# Patient Record
Sex: FEMALE | Race: BLACK OR AFRICAN AMERICAN | Employment: UNEMPLOYED | URBAN - METROPOLITAN AREA
[De-identification: names, ages, dates, MRNs, and addresses within clinical notes are randomized per-mention and may not be internally consistent; named-entity substitution may affect disease eponyms.]

---

## 2017-01-17 ENCOUNTER — TELEPHONE (OUTPATIENT)
Dept: FAMILY MEDICINE CLINIC | Age: 47
End: 2017-01-17

## 2017-01-17 NOTE — TELEPHONE ENCOUNTER
Patient called to say she was to have had a referral order. She did not say to where. Please advise as this will be regarding her last visit. There are no orders in chart.

## 2017-01-18 NOTE — TELEPHONE ENCOUNTER
Talked to the patient. She will make an appointment for [address] [place] [address] soon. Question about elevated glucose and MCV levels were explained.

## 2017-01-31 ENCOUNTER — HOSPITAL ENCOUNTER (OUTPATIENT)
Dept: ULTRASOUND IMAGING | Age: 47
Discharge: HOME | End: 2017-01-31
Attending: FAMILY MEDICINE
Payer: MEDICAID

## 2017-01-31 DIAGNOSIS — R10.33 PERIUMBILICAL ABDOMINAL PAIN: ICD-10-CM

## 2017-01-31 PROCEDURE — 76705 ECHO EXAM OF ABDOMEN: CPT

## 2017-01-31 NOTE — PROGRESS NOTES
Talked to the patient over the phone. Discussed the results. Currently abdominal pain resolved, however, I recommend to follow up with her GYN for possible uterine fibroma to see if it's size is growing. Patient will follow up with Dr. Leda Zambrano at [facility].

## 2017-02-01 ENCOUNTER — TELEPHONE (OUTPATIENT)
Dept: OBGYN CLINIC | Age: 47
End: 2017-02-01

## 2017-02-01 NOTE — TELEPHONE ENCOUNTER
2:53PM Pt called requesting an appt to be seen for a f/u from  for US done 1/31/2017. Stated she was told she had a 5 cm mass on her uterus. Appt given for 2/3/2017. Pt also requested STD testing.  FYI only

## 2017-02-01 NOTE — TELEPHONE ENCOUNTER
Make sure she has a pelvic ultrasound here that day as well. Thanks.  Not enough info on the one she had

## 2017-02-01 NOTE — TELEPHONE ENCOUNTER
Notified pt with MD recommendation. She verbalized understanding and was given an US appt with her ov.

## 2020-01-15 ENCOUNTER — OFFICE VISIT (OUTPATIENT)
Dept: OBGYN CLINIC | Age: 50
End: 2020-01-15

## 2020-01-15 ENCOUNTER — HOSPITAL ENCOUNTER (OUTPATIENT)
Dept: MAMMOGRAPHY | Age: 50
Discharge: HOME | End: 2020-01-15
Attending: FAMILY MEDICINE
Payer: COMMERCIAL

## 2020-01-15 VITALS
WEIGHT: 221 LBS | HEIGHT: 62 IN | BODY MASS INDEX: 40.67 KG/M2 | DIASTOLIC BLOOD PRESSURE: 96 MMHG | SYSTOLIC BLOOD PRESSURE: 176 MMHG

## 2020-01-15 DIAGNOSIS — Z01.419 ENCOUNTER FOR GYNECOLOGICAL EXAMINATION WITHOUT ABNORMAL FINDING: Primary | ICD-10-CM

## 2020-01-15 DIAGNOSIS — N89.8 VAGINAL DISCHARGE: ICD-10-CM

## 2020-01-15 DIAGNOSIS — Z11.51 SPECIAL SCREENING EXAMINATION FOR HUMAN PAPILLOMAVIRUS (HPV): ICD-10-CM

## 2020-01-15 DIAGNOSIS — Z12.31 VISIT FOR SCREENING MAMMOGRAM: ICD-10-CM

## 2020-01-15 DIAGNOSIS — E66.01 OBESITY, MORBID (HCC): ICD-10-CM

## 2020-01-15 DIAGNOSIS — D21.9 FIBROIDS: ICD-10-CM

## 2020-01-15 PROCEDURE — 77067 SCR MAMMO BI INCL CAD: CPT

## 2020-01-15 NOTE — PATIENT INSTRUCTIONS
Well Visit, Ages 25 to 48: Care Instructions  Your Care Instructions    Physical exams can help you stay healthy. Your doctor has checked your overall health and may have suggested ways to take good care of yourself. He or she also may have recommended tests. At home, you can help prevent illness with healthy eating, regular exercise, and other steps. Follow-up care is a key part of your treatment and safety. Be sure to make and go to all appointments, and call your doctor if you are having problems. It's also a good idea to know your test results and keep a list of the medicines you take. How can you care for yourself at home? · Reach and stay at a healthy weight. This will lower your risk for many problems, such as obesity, diabetes, heart disease, and high blood pressure. · Get at least 30 minutes of physical activity on most days of the week. Walking is a good choice. You also may want to do other activities, such as running, swimming, cycling, or playing tennis or team sports. Discuss any changes in your exercise program with your doctor. · Do not smoke or allow others to smoke around you. If you need help quitting, talk to your doctor about stop-smoking programs and medicines. These can increase your chances of quitting for good. · Talk to your doctor about whether you have any risk factors for sexually transmitted infections (STIs). Having one sex partner (who does not have STIs and does not have sex with anyone else) is a good way to avoid these infections. · Use birth control if you do not want to have children at this time. Talk with your doctor about the choices available and what might be best for you. · Protect your skin from too much sun. When you're outdoors from 10 a.m. to 4 p.m., stay in the shade or cover up with clothing and a hat with a wide brim. Wear sunglasses that block UV rays. Even when it's cloudy, put broad-spectrum sunscreen (SPF 30 or higher) on any exposed skin.   · See a dentist one or two times a year for checkups and to have your teeth cleaned. · Wear a seat belt in the car. Follow your doctor's advice about when to have certain tests. These tests can spot problems early. For everyone  · Cholesterol. Have the fat (cholesterol) in your blood tested after age 21. Your doctor will tell you how often to have this done based on your age, family history, or other things that can increase your risk for heart disease. · Blood pressure. Have your blood pressure checked during a routine doctor visit. Your doctor will tell you how often to check your blood pressure based on your age, your blood pressure results, and other factors. · Vision. Talk with your doctor about how often to have a glaucoma test.  · Diabetes. Ask your doctor whether you should have tests for diabetes. · Colon cancer. Your risk for colorectal cancer gets higher as you get older. Some experts say that adults should start regular screening at age 48 and stop at age 76. Others say to start before age 48 or continue after age 76. Talk with your doctor about your risk and when to start and stop screening. For women  · Breast exam and mammogram. Talk to your doctor about when you should have a clinical breast exam and a mammogram. Medical experts differ on whether and how often women under 50 should have these tests. Your doctor can help you decide what is right for you. · Cervical cancer screening test and pelvic exam. Begin with a Pap test at age 24. The test often is part of a pelvic exam. Starting at age 27, you may choose to have a Pap test, an HPV test, or both tests at the same time (called co-testing). Talk with your doctor about how often to have testing. · Tests for sexually transmitted infections (STIs). Ask whether you should have tests for STIs. You may be at risk if you have sex with more than one person, especially if your partners do not wear condoms.   For men  · Tests for sexually transmitted infections (STIs). Ask whether you should have tests for STIs. You may be at risk if you have sex with more than one person, especially if you do not wear a condom. · Testicular cancer exam. Ask your doctor whether you should check your testicles regularly. · Prostate exam. Talk to your doctor about whether you should have a blood test (called a PSA test) for prostate cancer. Experts differ on whether and when men should have this test. Some experts suggest it if you are older than 39 and are -American or have a father or brother who got prostate cancer when he was younger than 72. When should you call for help? Watch closely for changes in your health, and be sure to contact your doctor if you have any problems or symptoms that concern you. Where can you learn more? Go to http://perry-diogo.info/. Enter P072 in the search box to learn more about \"Well Visit, Ages 25 to 48: Care Instructions. \"  Current as of: December 13, 2018  Content Version: 12.2  © 5253-7549 [employer]. Care instructions adapted under license by [employer] (which disclaims liability or warranty for this information). If you have questions about a medical condition or this instruction, always ask your healthcare professional. [place] [address] any warranty or liability for your use of this information.

## 2020-01-15 NOTE — PROGRESS NOTES
Benson Murphy is a ,  47 y.o. female [address] whose LMP was on 1/3/2020 who presents for her annual checkup. She c/o vaginal discharge and requests std testing. Has hx of [place] Ale[place] and fibroids    Menstrual status:    Her periods are moderate in flow. She is using three to five pads or tampons per day, usually regular every 26-30 days. She denies dysmenorrhea. She reports no premenstrual symptoms. The patient is not using HRT. Contraception:    The current method of family planning is abstinence. Sexual history:    She  reports previously being sexually active and has had partner(s) who are Male. She reports using the following method of birth control/protection: Condom. Medical conditions:    Since her last annual GYN exam about 3/10/2016 ago, she has had the following changes in her health history: none. Pap and Mammogram History:    Her most recent Pap smear was normal obtained 2014 year(s) ago. The patient has her mammogram scheduled today. .    Breast Cancer History/Substance Abuse:    She has no family history of breast cancer. Osteoporosis History:    Family history does not include a first or second degree relative with osteopenia or osteoporosis. A bone density scan was not obtained. She is currently not taking calcium and vit D. Past Medical History:   Diagnosis Date    Anemia     Fibroids     2010 - embolization     Herpes genitalia     positive     Past Surgical History:   Procedure Laterality Date    HX COLPOSCOPY      WNL per patient    HX GYN  2011    uterine fibroid embolization    HX MYOMECTOMY       Current Outpatient Medications   Medication Sig Dispense Refill    MULTIVITAMIN WITH MINERALS (HAIR,SKIN AND NAILS PO) Take  by mouth.  omega-3 fatty acids-vitamin e (FISH OIL) 1,000 mg Cap Take 1 Cap by mouth.  multivitamin,tx-iron-ca-min (THERA-M) 27-0.4 mg Tab Take 1 Tab by mouth every other day.  naproxen (NAPROSYN) 375 mg tablet Take 1 Tab by mouth two (2) times daily (with meals). 20 Tab 0    ferrous sulfate (IRON) 325 mg (65 mg iron) tablet Take 1 Tab by mouth Daily (before breakfast). 90 Tab 3     Allergies: Patient has no known allergies. Social History     Socioeconomic History    Marital status: SINGLE     Spouse name: Not on file    Number of children: Not on file    Years of education: Not on file    Highest education level: Not on file   Occupational History    Not on file   Social Needs    Financial resource strain: Not on file    Food insecurity:     Worry: Not on file     Inability: Not on file    Transportation needs:     Medical: Not on file     Non-medical: Not on file   Tobacco Use    Smoking status: Former Smoker     Packs/day: 0.20     Years: 5.00     Pack years: 1.00     Types: Cigarettes     Last attempt to quit: 12/10/2011     Years since quittin.1    Smokeless tobacco: Never Used    Tobacco comment: 2-3 cigarettes a week   Substance and Sexual Activity    Alcohol use:  Yes     Alcohol/week: 2.5 standard drinks     Types: 1 Glasses of wine, 1 Cans of beer, 1 Shots of liquor per week     Comment: social     Drug use: No    Sexual activity: Not Currently     Partners: Male     Birth control/protection: Condom   Lifestyle    Physical activity:     Days per week: Not on file     Minutes per session: Not on file    Stress: Not on file   Relationships    Social connections:     Talks on phone: Not on file     Gets together: Not on file     Attends Sabianism service: Not on file     Active member of club or organization: Not on file     Attends meetings of clubs or organizations: Not on file     Relationship status: Not on file    Intimate partner violence:     Fear of current or ex partner: Not on file     Emotionally abused: Not on file     Physically abused: Not on file     Forced sexual activity: Not on file   Other Topics Concern    Not on file   Social History Narrative    Works from home        Lives with daughter     Tobacco History:  reports that she quit smoking about 8 years ago. Her smoking use included cigarettes. She has a 1.00 pack-year smoking history. She has never used smokeless tobacco.  Alcohol Abuse:  reports current alcohol use of about 2.5 standard drinks of alcohol per week. Drug Abuse:  reports no history of drug use.   Patient Active Problem List   Diagnosis Code    Anemia D64.9    Family history of diabetes mellitus Z83.3    Obesity E66.9    Family history of thyroid disease Z83.49    Cerumen impaction H61.20    Candidal intertrigo B37.2    Uterine fibroid D25.9    Preventative health care Z00.00         Review of Systems - History obtained from the patient  Constitutional: negative for weight loss, fever, night sweats  HEENT: negative for hearing loss, earache, congestion, snoring, sorethroat  CV: negative for chest pain, palpitations, edema  Resp: negative for cough, shortness of breath, wheezing  GI: negative for change in bowel habits, abdominal pain, black or bloody stools  : negative for frequency, dysuria, hematuria, vaginal discharge  MSK: negative for back pain, joint pain, muscle pain  Breast: negative for breast lumps, nipple discharge, galactorrhea  Skin :negative for itching, rash, hives  Neuro: negative for dizziness, headache, confusion, weakness  Psych: negative for anxiety, depression, change in mood  Heme/lymph: negative for bleeding, bruising, pallor    Physical Exam    Visit Vitals  BP (!) 176/96   Ht 5' 2\" (1.575 m)   Wt 221 lb (100.2 kg)   LMP 01/03/2020 (Approximate)   BMI 40.42 kg/m²     Constitutional  · Appearance: well-nourished, well developed, alert, in no acute distress    HENT  · Head and Face: appears normal    Neck  · Inspection/Palpation: normal appearance, no masses or tenderness  · Lymph Nodes: no lymphadenopathy present  · Thyroid: gland size normal, nontender, no nodules or masses present on palpation    Chest  · Respiratory Effort: breathing normal  · Auscultation: normal breath sounds    Cardiovascular  · Heart:  · Auscultation: regular rate and rhythm without murmur    Breasts  · Inspection of Breasts: breasts symmetrical, no skin changes, no discharge present, nipple appearance normal, no skin retraction present  · Palpation of Breasts and Axillae: no masses present on palpation, no breast tenderness  · Axillary Lymph Nodes: no lymphadenopathy present    Gastrointestinal  · Abdominal Examination: abdomen non-tender to palpation, normal bowel sounds, no masses present  · Liver and spleen: no hepatomegaly present, spleen not palpable  · Hernias: no hernias identified    Skin  · General Inspection: no rash, no lesions identified    Neurologic/Psychiatric  · Mental Status:  · Orientation: grossly oriented to person, place and time  · Mood and Affect: mood normal, affect appropriate    Genitourinary  · External Genitalia: normal appearance for age, no discharge present, no tenderness present, no inflammatory lesions present, no masses present, no atrophy present  · Vagina: normal vaginal vault without central or paravaginal defects, no discharge present, no inflammatory lesions present, no masses present  · Bladder: non-tender to palpation  · Urethra: appears normal  · Cervix: 16-18 weeks  · Adnexa: no adnexal tenderness present, no adnexal masses present  · Perineum: perineum within normal limits, no evidence of trauma, no rashes or skin lesions present  · Anus: anus within normal limits, no hemorrhoids present  · Inguinal Lymph Nodes: no lymphadenopathy present    Assessment:  Routine gynecologic examination  Fibroids  Desires STD testing    Plan:  Counseled re: diet, exercise, healthy lifestyle  Return for yearly wellness visits  Rec annual mammogram  Pap/HPV  GCC/STD testing

## 2020-01-16 LAB
COMMENT, 144067: NORMAL
HBV SURFACE AG SERPL QL IA: NEGATIVE
HCV AB S/CO SERPL IA: 0.1 S/CO RATIO (ref 0–0.9)
HIV 1+2 AB+HIV1 P24 AG SERPL QL IA: NON REACTIVE
HSV2 IGG SER IA-ACNC: 17.1 INDEX (ref 0–0.9)
RPR SER QL: NON REACTIVE

## 2020-01-18 LAB
A VAGINAE DNA VAG QL NAA+PROBE: NORMAL SCORE
BVAB2 DNA VAG QL NAA+PROBE: NORMAL SCORE
C ALBICANS DNA VAG QL NAA+PROBE: NEGATIVE
C GLABRATA DNA VAG QL NAA+PROBE: NEGATIVE
C TRACH RRNA SPEC QL NAA+PROBE: NEGATIVE
MEGA1 DNA VAG QL NAA+PROBE: NORMAL SCORE
N GONORRHOEA RRNA SPEC QL NAA+PROBE: NEGATIVE
T VAGINALIS RRNA SPEC QL NAA+PROBE: NEGATIVE

## 2020-01-21 ENCOUNTER — TELEPHONE (OUTPATIENT)
Dept: OBGYN CLINIC | Age: 50
End: 2020-01-21

## 2020-01-21 LAB
CYTOLOGIST CVX/VAG CYTO: NORMAL
CYTOLOGY CVX/VAG DOC CYTO: NORMAL
CYTOLOGY CVX/VAG DOC THIN PREP: NORMAL
CYTOLOGY HISTORY:: NORMAL
DX ICD CODE: NORMAL
HPV I/H RISK 1 DNA CVX QL PROBE+SIG AMP: NEGATIVE
Lab: NORMAL
Lab: NORMAL
OTHER STN SPEC: NORMAL
STAT OF ADQ CVX/VAG CYTO-IMP: NORMAL

## 2020-01-21 NOTE — TELEPHONE ENCOUNTER
Calling for lab results from 1-15-20       Patient Result Comments     Written by Jasmyn Lopez MD on 1/19/2020 10:14 PM   Labs show previous exposure to  genital herpes. If this is new information to you and you would like to discuss further please feel free to schedule appt and we can discuss further. not have access to [facility] now.       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

## 2020-01-28 NOTE — PROGRESS NOTES
Pt notified via triage- see telephone enc. 41F with PMH of uterine fibroids s/p open myomectomy (2016) and laser ablation (2018) presenting with one day of abdominal pain and obstructive symptoms. CT showed Suspected closed loop obstruction with hypoenhancement concerning for b boweel ischemia. Mesenteric swirling associated with the transition point. She was taken to the operating room emergently on 2/28/19 and underwent an exploratory laparotomy, lysis of adhesions, and small bowel resection of necrotic bowel, appendectomy, hysterorrhaphy, and myomectomy. Patient tolerated the procedure well and was transferred to PACU and then the floor without any issues. On POD1 she was noted to have a drop in her H/H from preop blood count levels. She was transfused two units of pRBCs and she had an appropriate rise in her H/H that evening. On POD2, her H/H remained stable and her diet was advanced as tolerate.    Once patient was ambulating well, voiding without difficulty, and tolerating a full diet, they were found to be stable for discharge to home.  Pain was well-controlled at time of discharge. 41F with PMH of uterine fibroids s/p open myomectomy (2016) and laser ablation (2018) presenting with one day of abdominal pain and obstructive symptoms. CT showed Suspected closed loop obstruction with hypoenhancement concerning for b boweel ischemia. Mesenteric swirling associated with the transition point. She was taken to the operating room emergently on 2/28/19 and underwent an exploratory laparotomy, lysis of adhesions, and small bowel resection of necrotic bowel, appendectomy, hysterorrhaphy, and myomectomy. Patient tolerated the procedure well and was transferred to PACU and then the floor without any issues. On POD1 she was noted to have a drop in her H/H from preop blood count levels. She was transfused two units of pRBCs and she had an appropriate rise in her H/H that evening. On POD2, her H/H remained stable and her diet was advanced as tolerate.    Once patient was ambulating well, voiding without difficulty, and tolerating a full diet, they were found to be stable for discharge to home.  Pain was well-controlled at time of discharge.     istop #: [id number]

## 2020-02-03 LAB — COLOGUARD TEST, EXTERNAL: NEGATIVE

## 2020-07-31 ENCOUNTER — TELEPHONE (OUTPATIENT)
Dept: OBGYN CLINIC | Age: 50
End: 2020-07-31

## 2020-07-31 NOTE — TELEPHONE ENCOUNTER
Call received at 135PM    45year old patient last seen in the office on  1/15/2020. Patient calling to say that her PCP did a CT scan and advised her that her fibroids are very big and she needs to follow up with her ob. Patient denies vaginal bleeding and discomfort currently. Patient reports the stomach was beginning to poke out causing her to see her PCP. ok to set up appointment for ultrasound and ov    Please advise            Patient will get the CT report from [facility] and have it faxed to our office.

## 2020-08-03 NOTE — TELEPHONE ENCOUNTER
This nurse attempted to reach and left a voice mail message for the patient to call the office back.

## 2020-09-10 ENCOUNTER — OFFICE VISIT (OUTPATIENT)
Dept: OBGYN CLINIC | Age: 50
End: 2020-09-10
Payer: COMMERCIAL

## 2020-09-10 VITALS
WEIGHT: 214 LBS | HEIGHT: 62 IN | DIASTOLIC BLOOD PRESSURE: 79 MMHG | BODY MASS INDEX: 39.38 KG/M2 | SYSTOLIC BLOOD PRESSURE: 126 MMHG

## 2020-09-10 DIAGNOSIS — K43.9 VENTRAL HERNIA WITHOUT OBSTRUCTION OR GANGRENE: ICD-10-CM

## 2020-09-10 DIAGNOSIS — D21.9 FIBROIDS: Primary | ICD-10-CM

## 2020-09-10 PROCEDURE — 99213 OFFICE O/P EST LOW 20 MIN: CPT | Performed by: OBSTETRICS & GYNECOLOGY

## 2020-09-10 PROCEDURE — 76830 TRANSVAGINAL US NON-OB: CPT | Performed by: OBSTETRICS & GYNECOLOGY

## 2020-09-10 NOTE — PROGRESS NOTES
Uterine Fibroids note    Chief Complaint   Follow-up      HPI  History:  45 y.o. female,  Patient's last menstrual period was 09/03/2020., presents for a follow up ultrasound, Has hx of [place] and fibroids      Current Method of Contraception is: abstinence    Patient states her PCP advised her to follow up with her GYN due to very large fibroids possibly extending her hernia. Patient's last menstrual period was 09/03/2020.      Patient had ultrasound today that revealed:        DIFFICULT SCAN DUE TO PATIENT BODY HABITUS. TA AND TV SCANS PERFORMED FOR BEST VISUALIZATION. UTERUS IS ANTEVERTED, ENLARGED IN SIZE AND HETEROGENOUS IN ECHOGENECITY. MULTIPLE FIBROIDS ARE SEEN. THE TWO LARGEST FIBROIDS ARE MEASURED. FIBROID 1, POSTERIOR LEFT  FIBROID 2, POSTERIOR SRIKANTH  ENDOMETRIUM MEASURES 3-4MM IN THICKNESS. NO EVIDENCE OF MASS OR ABNORMALITY SEEN. RIGHT ADNEXA APPEARS FIBROID FILLED. LEFT OVARY APPEARS WITHIN NORMAL LIMITS. A FOLLICULAR CYST IS SEEN. NO FREE FLUID SEEN IN THE CDS     CT scan done at [address]        Additional/Aggravating/Alleviating factors:    She denies Pelvic pressure, pelvic pain, dyspareunia, urinary pressure and frequency, low back pain, constipation    The patient reports the following aggravating factors: none  The patient the following alleviating factors: none     She reports the following past medical history that is notable: none  She states that she had this condition in the past.    She  is not ready to have a hysterectomy    Past Medical History:   Diagnosis Date    Anemia     Fibroids     July 2010 - embolization     Herpes genitalia     positive     Past Surgical History:   Procedure Laterality Date    HX COLPOSCOPY  1990s    WNL per patient    HX GYN  6/2011    uterine fibroid embolization    HX MYOMECTOMY       Social History     Occupational History    Not on file   Tobacco Use    Smoking status: Former Smoker     Packs/day: 0.20     Years: 5.00     Pack years: 1.00     Types: Cigarettes     Last attempt to quit: 12/10/2011     Years since quittin.7    Smokeless tobacco: Never Used    Tobacco comment: 2-3 cigarettes a week   Substance and Sexual Activity    Alcohol use: Yes     Alcohol/week: 2.5 standard drinks     Types: 1 Glasses of wine, 1 Cans of beer, 1 Shots of liquor per week     Comment: social     Drug use: No    Sexual activity: Not Currently     Partners: Male     Birth control/protection: Condom     Family History   Problem Relation Age of Onset    Diabetes Mother     Cancer Maternal Grandmother         ovary     Kidney Disease Maternal Grandmother     Cancer Other         endometrial    Cancer Other         aunt    Cancer Other     Diabetes Other      No Known Allergies  Prior to Admission medications    Medication Sig Start Date End Date Taking? Authorizing Provider   MULTIVITAMIN WITH MINERALS (HAIR,SKIN AND NAILS PO) Take  by mouth. Provider, Historical   naproxen (NAPROSYN) 375 mg tablet Take 1 Tab by mouth two (2) times daily (with meals). 16   Armando Diaz MD   ferrous sulfate (IRON) 325 mg (65 mg iron) tablet Take 1 Tab by mouth Daily (before breakfast). 13   Rosetta Vazquez MD   omega-3 fatty acids-vitamin e (FISH OIL) 1,000 mg Cap Take 1 Cap by mouth. Provider, Historical   multivitamin,tx-iron-ca-min (THERA-M) 27-0.4 mg Tab Take 1 Tab by mouth every other day.  11/15/10   Provider, Historical        Review of Systems - History obtained from the patient  Constitutional: negative for weight loss, fever, night sweats  HEENT: negative for hearing loss, earache, congestion, snoring, sorethroat  CV: negative for chest pain, palpitations, edema  Resp: negative for cough, shortness of breath, wheezing  GI: negative for change in bowel habits, abdominal pain, black or bloody stools  : negative for frequency, dysuria, hematuria, vaginal discharge    Objective:  Visit Vitals  /79   Ht 5' 2\" (1.575 m)   Wt 214 lb (97.1 kg)   LMP 09/03/2020   BMI 39.14 kg/m²     PHYSICAL EXAMINATION    Constitutional  · Appearance: well-nourished, well developed, alert, in no acute distress    HENT  · Head and Face: appears normal    Gastrointestinal  · Abdominal Examination: abdomen non-tender to palpation, normal bowel sounds, no masses present  · Liver and spleen: no hepatomegaly present, spleen not palpable  · Hernias: no hernias identified    Genitourinary  · External Genitalia: normal appearance for age, no discharge present, no tenderness present, no inflammatory lesions present, no masses present, no atrophy present  · Vagina: normal vaginal vault without central or paravaginal defects, no discharge present, no inflammatory lesions present, no masses present  · Bladder: non-tender to palpation  · Urethra: appears normal  · Cervix: normal   · Uterus: enlarged, irregular shape and consistency  · Adnexa: no adnexal tenderness present, no adnexal masses present  · Perineum: perineum within normal limits, no evidence of trauma, no rashes or skin lesions present  · Anus: anus within normal limits, no hemorrhoids present  · Inguinal Lymph Nodes: no lymphadenopathy present    Skin  · General Inspection: no rash, no lesions identified    Neurologic/Psychiatric  · Mental Status:  · Orientation: grossly oriented to person, place and time  · Mood and Affect: mood normal, affect appropriate    Assessment:   Uterine fibroids--symptomatic      Plan:  US  Endometrial biopsy  Sonohysterogram  D & C  Surgery:

## 2020-09-10 NOTE — PATIENT INSTRUCTIONS
Uterine Fibroids: Care Instructions Your Care Instructions Uterine fibroids are growths in the uterus. Fibroids aren't cancer. Doctors don't know what causes fibroids. Fibroids are very common in women during their childbearing years. Fibroids can grow on the inside of the uterus, in the muscle wall of the uterus, or near the outside wall of the uterus. In some women, fibroids cause painful cramps and heavy periods. In these cases, taking anti-inflammatory medicines, birth control pills, or using an intrauterine device (IUD) often helps decrease symptoms. Sometimes surgery is needed to treat fibroids. But if you are near menopause, you may want to wait and see if your symptoms get better. Most fibroids shrink and go away after menopause, when your menstrual periods stop completely. Follow-up care is a key part of your treatment and safety. Be sure to make and go to all appointments, and call your doctor if you are having problems. It's also a good idea to know your test results and keep a list of the medicines you take. How can you care for yourself at home? · If your doctor gave you medicine, take it as exactly as prescribed. Be safe with medicines. Call your doctor if you think you are having a problem with your medicine. · Take anti-inflammatory medicines for pain. These include ibuprofen (Advil, Motrin) and naproxen (Aleve). Read and follow all instructions on the label. · Use heat, such as a hot water bottle or a heating pad set on low, or a warm bath to relax tense muscles and relieve cramping. Put a thin cloth between the heating pad and your skin. Never go to sleep with a heating pad on. · Lie down and put a pillow under your knees. Or, lie on your side and bring your knees up to your chest. These positions may help relieve belly pain or pressure. · Keep track of how many sanitary pads or tampons you use each day. · Get at least 30 minutes of exercise on most days of the week.  Walking is a good choice. You also may want to do other activities, such as running, swimming, cycling, or playing tennis or team sports. · If you bleed longer than usual or have heavy bleeding, take a daily multivitamin with iron. When should you call for help? Call your doctor now or seek immediate medical care if: 
  · You have severe vaginal bleeding.  
  · You have new or worse belly or pelvic pain. Watch closely for changes in your health, and be sure to contact your doctor if: 
  · You have unusual vaginal bleeding.  
  · You do not get better as expected. Where can you learn more? Go to http://perry-diogo.info/ Enter B121 in the search box to learn more about \"Uterine Fibroids: Care Instructions. \" Current as of: November 8, 2019               Content Version: 12.6 © 6568-0723 [employer]. Care instructions adapted under license by [employer] (which disclaims liability or warranty for this information). If you have questions about a medical condition or this instruction, always ask your healthcare professional. [address] any warranty or liability for your use of this information.

## 2020-09-10 NOTE — PROGRESS NOTES
Chief Complaint   Follow-up      HPI  Nurys Yanes is a 45 y.o. female who presents for follow up to fibroids per PCP. Patient states that her PCP believes her fibroids are so large that they are extending her hernia. She has a ventral hernia. Has not seen surgeon. No pain   Patient's last menstrual period was 2020. Patient had ultrasound today that revealed:      DIFFICULT SCAN DUE TO PATIENT BODY HABITUS. TA AND TV SCANS PERFORMED FOR BEST VISUALIZATION. UTERUS IS ANTEVERTED, ENLARGED IN SIZE AND HETEROGENOUS IN ECHOGENECITY. MULTIPLE FIBROIDS ARE SEEN. THE TWO LARGEST FIBROIDS ARE MEASURED. FIBROID 1, POSTERIOR LEFT  FIBROID 2, POSTERIOR SRIKANTH  ENDOMETRIUM MEASURES 3-4MM IN THICKNESS. NO EVIDENCE OF MASS OR ABNORMALITY SEEN. RIGHT ADNEXA APPEARS FIBROID FILLED. LEFT OVARY APPEARS WITHIN NORMAL LIMITS. A FOLLICULAR CYST IS SEEN. NO FREE FLUID SEEN IN THE CDS          Past Medical History:   Diagnosis Date    Anemia     Fibroids     2010 - embolization     Herpes genitalia     positive     Past Surgical History:   Procedure Laterality Date    HX COLPOSCOPY      WNL per patient    HX GYN  2011    uterine fibroid embolization    HX MYOMECTOMY       Social History     Occupational History    Not on file   Tobacco Use    Smoking status: Former Smoker     Packs/day: 0.20     Years: 5.00     Pack years: 1.00     Types: Cigarettes     Last attempt to quit: 12/10/2011     Years since quittin.7    Smokeless tobacco: Never Used    Tobacco comment: 2-3 cigarettes a week   Substance and Sexual Activity    Alcohol use:  Yes     Alcohol/week: 2.5 standard drinks     Types: 1 Glasses of wine, 1 Cans of beer, 1 Shots of liquor per week     Comment: social     Drug use: No    Sexual activity: Not Currently     Partners: Male     Birth control/protection: Condom     Family History   Problem Relation Age of Onset    Diabetes Mother     Cancer Maternal Grandmother         ovary     Kidney Disease Maternal Grandmother     Cancer Other         endometrial    Cancer Other         aunt    Cancer Other     Diabetes Other        No Known Allergies  Prior to Admission medications    Medication Sig Start Date End Date Taking? Authorizing Provider   MULTIVITAMIN WITH MINERALS (HAIR,SKIN AND NAILS PO) Take  by mouth. Provider, Historical   naproxen (NAPROSYN) 375 mg tablet Take 1 Tab by mouth two (2) times daily (with meals). 5/27/16   Jammie Schaumann, MD   ferrous sulfate (IRON) 325 mg (65 mg iron) tablet Take 1 Tab by mouth Daily (before breakfast). 11/22/13   Kasey Cantu MD   omega-3 fatty acids-vitamin e (FISH OIL) 1,000 mg Cap Take 1 Cap by mouth. Provider, Historical   multivitamin,tx-iron-ca-min (THERA-M) 27-0.4 mg Tab Take 1 Tab by mouth every other day. 11/15/10   Provider, Historical        Review of Systems: A comprehensive review of systems was negative except for that written in the HPI. Objective:  Visit Vitals  /79   Ht 5' 2\" (1.575 m)   Wt 214 lb (97.1 kg)   LMP 09/03/2020   BMI 39.14 kg/m²       Physical Exam:   General appearance - alert, well appearing, and in no distress  Abdomen - soft, nontender, nondistended, no masses or organomegaly  Ventral hernia above umbilicus, the enlarged uterus is not palpable due to patient body habitus  Pelvic Exam: Uterus enlarged, sits high, not palpable through abdomen due to adiposity  Lymph- no adenopathy palpable  Skin-Warm and dry. no hyperpigmentation, vitiligo, or suspicious lesions           Assesment:   Enlarged fibroid uterus on ultrasound  Ventral hernia in upper abdomen    Plan:   Hysterectomy will not cure hernia. It is unlikely hyst and hernia repair can be done together due to hyst being   Dirty care - would let surgeon make that call. Uterine size appears stable from prior exam      RTO prn if symptoms persist or worsen. Instructions given to pt. Handouts given to pt.

## 2020-09-25 ENCOUNTER — HOSPITAL ENCOUNTER (OUTPATIENT)
Dept: PHYSICAL THERAPY | Age: 50
Discharge: HOME | End: 2020-09-25
Payer: COMMERCIAL

## 2020-09-25 ENCOUNTER — APPOINTMENT (OUTPATIENT)
Dept: PHYSICAL THERAPY | Age: 50
End: 2020-09-25

## 2020-09-25 PROCEDURE — 97162 PT EVAL MOD COMPLEX 30 MIN: CPT

## 2020-09-25 PROCEDURE — 97535 SELF CARE MNGMENT TRAINING: CPT

## 2020-09-25 NOTE — PROGRESS NOTES
[address]  [address]  [address]      INITIAL EVALUATION    NAME: Candido Salcido AGE: 45 y.o. GENDER: female  DATE: 9/25/2020  REFERRING PHYSICIAN: Brandon Cueva MD  HISTORY AND BACKGROUND: Patient referred to clinic for evaluation and treatment of LE lymphedema, L>R. Primary Diagnosis:  · Primary lymphedema (Q82.0)  Other Treatment Diagnoses:   Swelling not relieved by elevation (R60.9 edema)   Diabetic condition (E11.9)  Obesity (E66.9)  Date of Onset:6+ years  Present Symptoms and Functional Limitations: LE pain, increase in L LE size, significant impact on body image limiting socializing with others. Frustration regarding progressive of lymphedema, lack of understanding of management of lymphedema. Difficulty finding clothes/shoes that fit. Lymphedema Life Impact Scale: 22/68; 32% impairment  Past Medical History:   Past Medical History:   Diagnosis Date    Anemia     Diabetes ([address]     prediabetes    Fibroids     July 2010 - embolization     Herpes genitalia     positive     Past Surgical History:   Procedure Laterality Date    HX COLPOSCOPY  1990s    WNL per patient    HX GYN  6/2011    uterine fibroid embolization    HX MYOMECTOMY       Current Medications:    Current Outpatient Medications   Medication Sig    MULTIVITAMIN WITH MINERALS (HAIR,SKIN AND NAILS PO) Take  by mouth.  naproxen (NAPROSYN) 375 mg tablet Take 1 Tab by mouth two (2) times daily (with meals).  ferrous sulfate (IRON) 325 mg (65 mg iron) tablet Take 1 Tab by mouth Daily (before breakfast).  omega-3 fatty acids-vitamin e (FISH OIL) 1,000 mg Cap Take 1 Cap by mouth.  multivitamin,tx-iron-ca-min (THERA-M) 27-0.4 mg Tab Take 1 Tab by mouth every other day. No current facility-administered medications for this encounter.       Allergies: No Known Allergies   Prior Level of Function/Social/Work History/Personal factors and/or comorbidities impacting plan of care: Patient reports L LE swelling 6+ years, previously worked full-time. Lives with her 22 y/o daughter. Has a 29 y/o son. Pt noted to be prediabetic, obese. Living Situation: private residence, no stairs. Trainable Caregiver?: daughter, as needed. Self-care/ADLs:mod I, additional time caring for L LE skin. Mobility: indep   Sleeping Arrangement:  bed   Adaptive Equipment Owned: na  Previous Therapy:  Patient reports two treatments at [address] [place] [address] lymphedema clinic 2 months ago, discontinuing to come to this clinic due to proximity to home. Compression/Lymphedema Equipment:  Compression bandaging supplies, to bring to next treatment. SUBJECTIVE:   \"My leg has gotten worse over the past 2 years. \"  Patient reports noticing swelling over 6 years ago, working full-time. Patient reports prior to 2 years ago, LE swelling noted to be intermittent, improving with elevation, limited skin changes. Patient reports no history of LE cellulitis. Patients goals for therapy: reduced size of legs, L LE being of greatest concern, and learn how to manage lymphedema. OBJECTIVE DATA SUMMARY:   EXAMINATION/PRESENTATION/DECISION MAKING:   Pain:  Pain Scale 1: Numeric (0 - 10)  Pain Intensity 1: 3  Pain Location 1: Leg    Skin and Tissue Assessment:  Dermal Status:  [x]   Intact: R LE []  Dry   [x]  Tenuous: L LE [] Flaky   []  Wound/lesion present []  Scars   []  Dermatitis Fungal infection noted bilateral toes, L>R, L lower leg. See photos. Texture/Consistency:  [x]  Boggy [x]  Pitting Edema: +3 R/+2 L dorsal foot. []  Brawny []  Combination   [x]  Fibrotic/Woody: L dorsal foot/lower leg.     Pigmentation/Color Change:  []  Normal [x]  Hemosiderin   []  Red []  Erythematous   [x]  Hyperpigmented: full L LE [x]  Hyperlipodermatosclerosis   Anomalies:  []  Lymphorrhea []  Vesicles   []  Petechiae [x]  Warty Vercusis   []  Bullae [] Papilloma   Circulatory:  []  MARCELO []  Varicosities:   [x]  Pulses: dorsal pedal pulses palpable [x]  Vascular studies ruled out DVT in past   []  DVT History    Nails:  []   Normal  [x]   Fungus  Stemmers Sign:positive, R>L  Photos:      Height:   5'2\"  Weight:   206.6 lbs   BMI:   37.8  (36 or greater: adversely affecting lymphedema)  Volumetric Measurements:   Right:  11,207. 69 mL Left:  12,575.14 mL   % Difference: 12.20% L>R   (See scanned graph)  Range of Motion: bilateral LE WFL AROM. Strength: Hip m 4+/5, remaining LE strength grossly 5/5 with MMT bilateral LE>  Sensation:  Intact to light touch bilateral LE. Mobility:  Bed/Chair Mobility:  Independent  Transfers:  Independent    Sitting Balance:  good Standing Balance:  good   Gait:  Independent  Wheelchair Mobility:  (Other) Not assessed. No stairs at home.    Endurance:  gait Stairs:  (Other) not assessed         Safety:  Patient is alert and oriented:  x4   Safety awareness:  intact   Fall Risk?:  low   Patient given written fall prevention handout: Yes   Precautions:  Standard lymphedema precautions to include avoiding blood pressure readings, injections and IVs or other procedures/acts that could lead to broken skin on affected area, and avoiding excessive heat, resistive activity or altitude without compression garment    Functional Measure:   LLIS      Physical Therapy Evaluation Charge Determination   History Examination Presentation Decision-Making   MEDIUM  Complexity : 1-2 comorbidities / personal factors will impact the outcome/ POC  MEDIUM Complexity : 3 Standardized tests and measures addressing body structure, function, activity limitation and / or participation in recreation  MEDIUM Complexity : Evolving with changing characteristics  Other outcome measures LLIS  MEDIUM      Based on the above components, the patient evaluation is determined to be of the following complexity level: MEDIUM    Evaluation Time:11:20-11:48 am    TREATMENT PROVIDED:   1. Treatment description: The patient was educated regarding the role and function of the lymphatic system, pathophysiology of lymphedema, and instructed in the lymphedema management protocol of complete decongestive therapy (CDT). This includes skin care to prevent breakdown or infection, lymphedema exercises, custom compression therapy options (bandaging, compression garments, compression pump, [employer], [employer], [employer], etc. as needed), and decongestion with manual lymphatic drainage as indicated. We discussed the need for consistent compression system for lymphedema management. Diaphragmatic breathing instruction and skin care education was performed,applying low pH lotion to extremity using upward strokes to stimulate lymphatic vessels. Pt was given information regarding obtaining bandaging supplies. Treatment time: 11:48 am-12:33 pm  Minutes: 45 minutes    Self care 3      ASSESSMENT:   Candido Salcido is a 45 y.o. female who presents with bilateral LE swelling, L>R, with significant skin changes, fungal infection bilateral toes, L lower leg. Patient reports no history of lymph node removal, radiation, or other potential damage to the lymphatic system. Patient's presentation and medical history are consistent with primary lymphedema, L LE stage III, R LE stage II mild. Patient has received limited care at clinic in [facility], however, due to proximity patient resides to this clinic, she made the decision to discontinue care at previous clinic and start care at [address] [place] [address]. Patient will benefit from complete decongestive therapy (CDT) including manual lymphatic drainage (MLD) technique, short-stretch textile bandages/compression system to decongest limb, and kinesiotaping as appropriate.   Patient will receive instruction in proper skin care to recognize signs/symptoms of and prevent infection, therapeutic exercise, and self-MLD for independent home program and restorative lymphatic performance. This care is medically necessary due to the infection risk with lymphedema, and to improve functional activities. CDT is necessary to resolve swelling to allow patient to return to wearing normal clothes/footwear, and prevent worsening of symptoms, such as venous stasis ulcerations, infections, or hospitalizations. Patient will be independent with home program strategies to allow improved ADL ability and mobility and to allow patient to return to greatest functional independence. Rehabilitation potential is considered to be Good. Factors which may influence rehabilitation potential include obesity. Patient will benefit from 14-18 physical therapy visits over 12 weeks to optimize improvement in these areas. PLAN OF CARE:   Recommendations and Planned Interventions:  Manual lymph drainage/complete decongestive therapy  Multi-layer compression bandaging (short-stretch)  Compression garment fitting/provision  Lymphedema therapeutic exercise  AROM/PROM/Strength/Coordination  Self-care training  Education in skin care and lymphedema precautions  Self-MLD education per home program  Self-bandaging education per home program  Caregiver education as needed     GOALS  Short term goals  Time frame: 6 weeks  1. Instruct the patient to be independent with proper skin care to prevent future skin breakdown and decrease the potential risk for infections that are associated with Lymphedema. 2. Patient will be independent with a personal lymphedema exercise program to assist with the lymphatic flow and reduce limb volume by 0381-7709 mL L LE, 500-1000 mL R LE.  3. Patient will understand the signs and symptoms of acute infection. Long term goals  Time frame: 6-12 weeks  1. Patient will have knowledge of the compression options and acquire a safe and  appropriate daytime and night time compression system to prevent  re-accumulation of fluid.   2.  Patient or family will be able to don/doff garments independently. Garment  system effectiveness will be evaluated prior to discharge for better long-term  management and outcomes. 3.  Improvement in functional outcomes measure by 10% to allow for overall improvement in mobility with reduced pain. 4.   To transition patient to independent restorative phase of CDT. Patient has participated in goal setting and agrees to work toward plan of care. Patient was instructed to call if questions or concerns arise. Thank you for this referral,  Tl Man PT, [facility]   Time Calculation: 73 mins    TREATMENT PLAN EFFECTIVE DATES:   9/25/2020 TO 12/20/2020  I have read the above plan of care for Mimi Liu. I certify the above prescribed services are required by this patient and are medically necessary.   The above plan of care has been developed in conjunction with the lymphedema/physical therapist.       Physician Signature: ____________________________________Date:______________

## 2020-09-28 VITALS — DIASTOLIC BLOOD PRESSURE: 88 MMHG | HEART RATE: 81 BPM | SYSTOLIC BLOOD PRESSURE: 132 MMHG | OXYGEN SATURATION: 99 %

## 2020-10-01 ENCOUNTER — HOSPITAL ENCOUNTER (OUTPATIENT)
Dept: PHYSICAL THERAPY | Age: 50
Discharge: HOME | End: 2020-10-01
Payer: COMMERCIAL

## 2020-10-01 PROCEDURE — 97140 MANUAL THERAPY 1/> REGIONS: CPT

## 2020-10-01 NOTE — PROGRESS NOTES
[facility]  Lymphedema Clinic and Cancer Rehabilitation  [address]  [address] [address]  Abdiaziz Mendoza        LYmphedema Therapy  Visit: 2    [x]                  Daily note               []                 30 day/10th visit progress note    NAME: Melina Kline  DATE: 10/1/2020    GOALS  Short term goals  Time frame: 6 weeks  1. Instruct the patient to be independent with proper skin care to prevent future skin breakdown and decrease the potential risk for infections that are associated with Lymphedema. 2. Patient will be independent with a personal lymphedema exercise program to assist with the lymphatic flow and reduce limb volume by 8520-0900 mL L LE, 500-1000 mL R LE.  3. Patient will understand the signs and symptoms of acute infection. Long term goals  Time frame: 6-12 weeks  1. Patient will have knowledge of the compression options and acquire a safe and       appropriate daytime and night time compression system to prevent             re-accumulation of fluid. 2.  Patient or family will be able to don/doff garments independently. Garment   system effectiveness will be evaluated prior to discharge for better long-term  management and outcomes. 3.  Improvement in functional outcomes measure by 10% to allow for overall improvement in mobility with reduced pain. 4.   To transition patient to independent restorative phase of CDT. SUBJECTIVE REPORT:  Patient arrives to treatment with limited bandaging supplies, however, is agreeable to purchase supplies needed. Patient agreeable to proceeding with treatment. Pain: 3/10, limb heaviness.                                 Gait:    [x]  Independent gait without any assistive device for community distances  ADLs: indep  Treatment Response:    [x]   Patient reviewed packet received at evaluation  [x]   Patient completed home program as prescribed  []   Patient not fully compliant with home program to date  []   Patient waiting on compression garment arrival  Function:  []   Patient requiring less assistance with completion of home program  []   ADLs are requiring less assistance   []   Patient able to return to work/leisure activities    TREATMENT AND OBJECTIVE DATA SUMMARY:   Patient/Family Education:      Educated in skin care: [x]   Skin care products  [x]   Hygiene  [x]  Prevention of cellulitis  []   Wound care     Educated in exercise: []   Walking program  []   Bambi ball routine  []   Stick routine  [x]   ROM routine     Instructed in self MLD:   Written sequence given and reviewed with patient as well as demonstration and instruction during MLD portion of the session. Instructed in don/doff of compression system:   []   Multi layer bandage (MLB) donning principles and wear precautions  []   Day garments  []   Night garments       Therapeutic Exercise:      Free exercises/ROM: Patient to continue ankle pumps/circumduction/toe flex and extension x 10/day, 2-3x/day as tolerated. Home program: Patient to perform daily to BID:  [x]   Skin care  [x]   Deep abdominal breathing  [x]   Exercise routine  []   Walking program  [x]   Rest in supine   [x]   Compression bandage  []   Compression garments  []   Vasopneumatic device  []   Wound care  [x]   Self MLD  [x]   Bring supplies to each therapy visit  [x]   Purchase necessary supplies  []   Weight loss program  []   Follow up with       Rationale: Exercise will increase the lymph angiomotoricity and tissue pressure of the skin and thus decrease swelling.        Manual therapy: 11:45 am-12:48 pm 63 minutes     Area to decongest:    [] UE          []  Right     []  Left      [] Trunk      [x] LE             []  Right     [x]  Left      [] Trunk         Sequence used and effectiveness: [] Secondary/Primary sequence for upper extremity with / without trunk involvement    [x] Primary sequence for lower extremities with trunk involvement: diaphragmatic breathing x 10, full L LE MLD sequence. [] Modifications were made to manual lymph drainage sequence to exclude cervical techniques secondary to patient's age    [x] Self MLD sequence/techniques/hand strokes   Skin/wound care/debridement: Applied anti-fungal ointment lower leg/toes/dorsal foot L. Eucerin lotion applied upward strokes lower leg avoiding fungal infected area. Upper/Lower extremity compression: Applied multi-layer compression bandaging to: Below knee [x]  Thigh high  []   Upper Extremity []    Right []   Left [x]    Bilateral []    The following multi-layer bandages were applied:  []  Tricofix            []  Silver Stockinette             []  Tg soft  [x]  Protouch    [x]  Transelast-toe wrap    Padding layer:  []  Cellona         [x]  Fleece: foot/calf     Foam:  [] 10cm roll  [] 12cm roll  [] 15cm roll  [] Gray foam  [] Komprex  [] Komprex 2      Short stretch bandages:   [] 4cm  [x] 6cm  [x] 8cm  [x] 10cm  [] 12cm  Educated patient in multi-layer bandaging donning principles and precautions. Compression bandaging instructions:  1. Compression bandaging will be applied twice a week by therapist in clinic, with adjustments to be made at home as indicated if bandaging becomes loose or uncomfortable. 2. If tolerated, remain bandaged between appointments with therapist, removing under the following conditionsDO NOT WAIT FOR A RETURN PHONE Nixon 69:    -Numbness/tingling in extremity different from what you have experienced without the bandages in place.  -Compromise in circulation, monitoring blood refill into toes after applying gentle pressure to toes.  -Onset of pain in extremity that is sudden and severe in nature. -Redness, warmth in limb, and/or fever, flu-like symptoms, which may indicate infection. If this occurs, call your doctor right away.   If you note a sudden increase in swelling in extremity, with or without redness/warmth, go to the emergency room as soon as possible to have blood clot ruled out. 3. Compression bandaging supplies that can be laundered are the brown compression wraps and any foam applied for padding. Launder in washer/dryer with NO fabric softener, bleach, woolite. Dry on a low heat. 4. Once compression garments are ordered, compression bandaging will be continued until garments arrive for fitting. This process can take several weeks. Kinesiotaping: na   Girth/Volume measurement: Reviewed results of volumetric measurements taken on evaluation. TOTAL TREATMENT 63 mins       ASSESSMENT:   Treatment effectiveness and tolerance: Patient tolerated MLD/MLB well. Patient provided with list of bandaging supplies for purchase. Patient instructed in [facility] management between treatments as noted above. Progress toward goals: Ongoing. PLAN OF CARE:   Changes to the plan of care: 1. Assess response to MLD/MLB  2. Continue MLD/MLB/Skin care. 3. Progress HEP.    Frequency: [x]  2 times a week  []  Weekly  []  Biweekly  []  Monthly           Xavi Motta PT, KAELA

## 2020-10-05 ENCOUNTER — APPOINTMENT (OUTPATIENT)
Dept: PHYSICAL THERAPY | Age: 50
End: 2020-10-05
Payer: COMMERCIAL

## 2020-10-09 ENCOUNTER — HOSPITAL ENCOUNTER (OUTPATIENT)
Dept: PHYSICAL THERAPY | Age: 50
Discharge: HOME | End: 2020-10-09
Payer: COMMERCIAL

## 2020-10-09 PROCEDURE — 97140 MANUAL THERAPY 1/> REGIONS: CPT

## 2020-10-09 NOTE — PROGRESS NOTES
[facility]  Lymphedema Clinic and Cancer Rehabilitation  [address] [place] [address]  [address]  Abdiaziz Mendoza        LYmphedema Therapy  Visit: 3    [x]                  Daily note               []                 30 day/10th visit progress note    NAME: Santa Jett  DATE: 10/9/2020    GOALS  Short term goals  Time frame: 6 weeks  1. Instruct the patient to be independent with proper skin care to prevent future skin breakdown and decrease the potential risk for infections that are associated with Lymphedema. 2. Patient will be independent with a personal lymphedema exercise program to assist with the lymphatic flow and reduce limb volume by 5302-1934 mL L LE, 500-1000 mL R LE.  3. Patient will understand the signs and symptoms of acute infection. Long term goals  Time frame: 6-12 weeks  1. Patient will have knowledge of the compression options and acquire a safe and       appropriate daytime and night time compression system to prevent             re-accumulation of fluid. 2.  Patient or family will be able to don/doff garments independently. Garment   system effectiveness will be evaluated prior to discharge for better long-term  management and outcomes. 3.  Improvement in functional outcomes measure by 10% to allow for overall improvement in mobility with reduced pain. 4.   To transition patient to independent restorative phase of CDT. SUBJECTIVE REPORT:  Patient arrives to treatment with L LE compression bandaging in place. Patient agreeable to proceeding with treatment. Pain: 3/10, limb heaviness.                                 Gait:    [x]  Independent gait without any assistive device for community distances  ADLs: indep  Treatment Response:    [x]   Patient reviewed packet received at evaluation  [x]   Patient completed home program as prescribed  []   Patient not fully compliant with home program to date  []   Patient waiting on compression garment arrival  Function:  []   Patient requiring less assistance with completion of home program  []   ADLs are requiring less assistance   []   Patient able to return to work/leisure activities    TREATMENT AND OBJECTIVE DATA SUMMARY:   Patient/Family Education:      Educated in skin care: [x]   Skin care products  [x]   Hygiene  [x]  Prevention of cellulitis  []   Wound care     Educated in exercise: []   Walking program  []   Bambi ball routine  []   Stick routine  [x]   ROM routine     Instructed in self MLD:   Written sequence given and reviewed with patient as well as demonstration and instruction during MLD portion of the session. Instructed in don/doff of compression system:   []   Multi layer bandage (MLB) donning principles and wear precautions  []   Day garments  []   Night garments       Therapeutic Exercise:      Free exercises/ROM: Patient to continue ankle pumps/circumduction/toe flex and extension x 10/day, 2-3x/day as tolerated. Home program: Patient to perform daily to BID:  [x]   Skin care  [x]   Deep abdominal breathing  [x]   Exercise routine  []   Walking program  [x]   Rest in supine   [x]   Compression bandage  []   Compression garments  []   Vasopneumatic device  []   Wound care  [x]   Self MLD  [x]   Bring supplies to each therapy visit  [x]   Purchase necessary supplies  []   Weight loss program  []   Follow up with       Rationale: Exercise will increase the lymph angiomotoricity and tissue pressure of the skin and thus decrease swelling.        Manual therapy: 11:20 am-12:22 pm 62 minutes     Area to decongest:    [] UE          []  Right     []  Left      [] Trunk      [x] LE             []  Right     [x]  Left      [] Trunk         Sequence used and effectiveness: [] Secondary/Primary sequence for upper extremity with / without trunk involvement    [x] Primary sequence for lower extremities with trunk involvement: diaphragmatic breathing x 10, full L LE MLD sequence. [] Modifications were made to manual lymph drainage sequence to exclude cervical techniques secondary to patient's age    [x] Self MLD sequence/techniques/hand strokes   Skin/wound care/debridement: Blunt debridement with forceps of fungal crusting lower leg L LE. Lotrimin spray bilateral toes. Applied anti-fungal ointment affected area lower leg/toes/dorsal foot L. Eucerin lotion applied upward strokes lower leg avoiding fungal infected area. Upper/Lower extremity compression: Applied multi-layer compression bandaging to: Below knee [x]  Thigh high  []   Upper Extremity []    Right []   Left [x]    Bilateral []    The following multi-layer bandages were applied:  []  Tricofix            []  Silver Stockinette             []  Tg soft  [x]  Protouch    [x]  Transelast-toe wrap    Padding layer:  [x]  Cast padding: foot/ankle   [x]  Fleece: ankle/calf     Foam:  [] 10cm roll  [] 12cm roll  [] 15cm roll  [] Gray foam  [] Komprex  [] Komprex 2      Short stretch bandages:   [] 4cm  [x] 6cm  [x] 8cm  [x] 10cm  [] 12cm  Educated patient in multi-layer bandaging donning principles and precautions. Compression bandaging instructions:  1. Compression bandaging will be applied twice a week by therapist in clinic, with adjustments to be made at home as indicated if bandaging becomes loose or uncomfortable. 2. If tolerated, remain bandaged between appointments with therapist, removing under the following conditionsDO NOT WAIT FOR A RETURN PHONE Nixon 69:    -Numbness/tingling in extremity different from what you have experienced without the bandages in place.  -Compromise in circulation, monitoring blood refill into toes after applying gentle pressure to toes.  -Onset of pain in extremity that is sudden and severe in nature. -Redness, warmth in limb, and/or fever, flu-like symptoms, which may indicate infection. If this occurs, call your doctor right away.   If you note a sudden increase in swelling in extremity, with or without redness/warmth, go to the emergency room as soon as possible to have blood clot ruled out. 3. Compression bandaging supplies that can be laundered are the brown compression wraps and any foam applied for padding. Launder in washer/dryer with NO fabric softener, bleach, woolite. Dry on a low heat. 4. Once compression garments are ordered, compression bandaging will be continued until garments arrive for fitting. This process can take several weeks. Kinesiotaping: na   Girth/Volume measurement: Reviewed results of volumetric measurements taken on evaluation. TOTAL TREATMENT 62 mins       ASSESSMENT:   Treatment effectiveness and tolerance: Patient tolerated MLD/MLB well. Patient provided with donated bandaging supplies due to patient reporting unable to afford supplies due to being unemployed. Patient instructed in [facility] management between treatments as noted above. Progress toward goals: Ongoing. PLAN OF CARE:   Changes to the plan of care: 1. Assess response to MLD/MLB  2. Continue MLD/MLB/Skin care. 3. Progress HEP.    Frequency: [x]  2 times a week  []  Weekly  []  Biweekly  []  Monthly           Kevin Vaughn PT, KAELA

## 2020-10-13 ENCOUNTER — HOSPITAL ENCOUNTER (OUTPATIENT)
Dept: PHYSICAL THERAPY | Age: 50
Discharge: HOME | End: 2020-10-13
Payer: COMMERCIAL

## 2020-10-13 PROCEDURE — 97140 MANUAL THERAPY 1/> REGIONS: CPT

## 2020-10-13 NOTE — PROGRESS NOTES
[facility]  Lymphedema Clinic and Cancer Rehabilitation  [address] [place] [address]  [address]  Abdiaziz Mendoza        LYmphedema Therapy  Visit: 4    [x]                  Daily note               []                 30 day/10th visit progress note    NAME: Elvia Aschoff  DATE: 10/13/2020    GOALS  Short term goals  Time frame: 6 weeks  1. Instruct the patient to be independent with proper skin care to prevent future skin breakdown and decrease the potential risk for infections that are associated with Lymphedema. 2. Patient will be independent with a personal lymphedema exercise program to assist with the lymphatic flow and reduce limb volume by 1715-1594 mL L LE, 500-1000 mL R LE.  3. Patient will understand the signs and symptoms of acute infection. Long term goals  Time frame: 6-12 weeks  1. Patient will have knowledge of the compression options and acquire a safe and       appropriate daytime and night time compression system to prevent             re-accumulation of fluid. 2.  Patient or family will be able to don/doff garments independently. Garment   system effectiveness will be evaluated prior to discharge for better long-term  management and outcomes. 3.  Improvement in functional outcomes measure by 10% to allow for overall improvement in mobility with reduced pain. 4.   To transition patient to independent restorative phase of CDT. SUBJECTIVE REPORT:  Patient arrives to treatment with L LE compression bandaging in place. Patient agreeable to proceeding with treatment. Pain: 3/10, limb heaviness.                                 Gait:    [x]  Independent gait without any assistive device for community distances  ADLs: indep  Treatment Response:    [x]   Patient reviewed packet received at evaluation  [x]   Patient completed home program as prescribed  []   Patient not fully compliant with home program to date  []   Patient waiting on compression garment arrival  Function:  []   Patient requiring less assistance with completion of home program  []   ADLs are requiring less assistance   []   Patient able to return to work/leisure activities    TREATMENT AND OBJECTIVE DATA SUMMARY:   Patient/Family Education:      Educated in skin care: [x]   Skin care products  [x]   Hygiene  [x]  Prevention of cellulitis  []   Wound care     Educated in exercise: []   Walking program  []   Bambi ball routine  []   Stick routine  [x]   ROM routine     Instructed in self MLD:   Written sequence given and reviewed with patient as well as demonstration and instruction during MLD portion of the session. Instructed in don/doff of compression system:   []   Multi layer bandage (MLB) donning principles and wear precautions  []   Day garments  []   Night garments       Therapeutic Exercise:      Free exercises/ROM: Patient to continue ankle pumps/circumduction/toe flex and extension x 10/day, 2-3x/day as tolerated. Home program: Patient to perform daily to BID:  [x]   Skin care  [x]   Deep abdominal breathing  [x]   Exercise routine  []   Walking program  [x]   Rest in supine   [x]   Compression bandage  []   Compression garments  []   Vasopneumatic device  []   Wound care  [x]   Self MLD  [x]   Bring supplies to each therapy visit  [x]   Purchase necessary supplies  []   Weight loss program  []   Follow up with       Rationale: Exercise will increase the lymph angiomotoricity and tissue pressure of the skin and thus decrease swelling.        Manual therapy: 11:15 am-12:09 pm 54 minutes     Area to decongest:    [] UE          []  Right     []  Left      [] Trunk      [x] LE             []  Right     [x]  Left      [] Trunk         Sequence used and effectiveness: [] Secondary/Primary sequence for upper extremity with / without trunk involvement    [x] Primary sequence for lower extremities with trunk involvement: diaphragmatic breathing x 10, full L LE MLD sequence. Stim L axillary/inguinal nodes, activated L IA anastomosis. [] Modifications were made to manual lymph drainage sequence to exclude cervical techniques secondary to patient's age    [x] Self MLD sequence/techniques/hand strokes   Skin/wound care/debridement: Blunt debridement with forceps of fungal crusting lower leg L LE/toes. Anti-fungal ointment  bilateral toes. Applied anti-fungal ointment affected area lower leg/toes/dorsal foot L. Eucerin lotion applied upward strokes L lower leg avoiding fungal infected area. Upper/Lower extremity compression: Applied multi-layer compression bandaging to: Below knee [x]  Thigh high  []   Upper Extremity []    Right []   Left [x]    Bilateral []    The following multi-layer bandages were applied:  []  Tricofix            []  Silver Stockinette             []  Tg soft  [x]  Protouch    [x]  Transelast-toe wrap    Padding layer:  [x]  Cast padding: foot/ankle   [x]  Fleece: ankle/calf     Foam:  [] 10cm roll  [] 12cm roll  [] 15cm roll  [] Gray foam  [] Komprex  [] Komprex 2      Short stretch bandages:   [] 4cm  [x] 6cm  [x] 8cm  [] 10cm  [x] 12cm  Educated patient in multi-layer bandaging donning principles and precautions. Compression bandaging instructions:  1. Compression bandaging will be applied twice a week by therapist in clinic, with adjustments to be made at home as indicated if bandaging becomes loose or uncomfortable. 2. If tolerated, remain bandaged between appointments with therapist, removing under the following conditionsDO NOT WAIT FOR A RETURN PHONE Nixon 69:    -Numbness/tingling in extremity different from what you have experienced without the bandages in place.  -Compromise in circulation, monitoring blood refill into toes after applying gentle pressure to toes.  -Onset of pain in extremity that is sudden and severe in nature. -Redness, warmth in limb, and/or fever, flu-like symptoms, which may indicate infection.   If this occurs, call your doctor right away. If you note a sudden increase in swelling in extremity, with or without redness/warmth, go to the emergency room as soon as possible to have blood clot ruled out. 3. Compression bandaging supplies that can be laundered are the brown compression wraps and any foam applied for padding. Launder in washer/dryer with NO fabric softener, bleach, woolite. Dry on a low heat. 4. Once compression garments are ordered, compression bandaging will be continued until garments arrive for fitting. This process can take several weeks. Kinesiotaping: na   Girth/Volume measurement: See girth measurements below. TOTAL TREATMENT 54 mins   Affected Side: L LE   Left (cm) Right (cm)   5th Tuberosity 23.9           Ankle 30           Calf 37.8           Mid Knee             Thigh             Groin             Length                 ASSESSMENT:   Treatment effectiveness and tolerance: Patient tolerated MLD/MLB well. Patient provided with donated bandaging supplies due to patient reporting unable to afford supplies due to being unemployed. Bandaging supplied re-rolled for use today due to patient failing to bring clean supplied to clinic for treatment today. Patient instructed in [facility] management between treatments as noted above. Progress toward goals: Ongoing. PLAN OF CARE:   Changes to the plan of care: 1. Assess response to MLD/MLB  2. Continue MLD/MLB/Skin care. 3. Progress HEP.    Frequency: [x]  2 times a week  []  Weekly  []  Biweekly  []  Monthly           Vashti Sykes PT, KAELA

## 2020-10-15 ENCOUNTER — HOSPITAL ENCOUNTER (OUTPATIENT)
Dept: PHYSICAL THERAPY | Age: 50
Discharge: HOME | End: 2020-10-15
Payer: COMMERCIAL

## 2020-10-15 PROCEDURE — 97140 MANUAL THERAPY 1/> REGIONS: CPT

## 2020-10-15 PROCEDURE — 97016 VASOPNEUMATIC DEVICE THERAPY: CPT

## 2020-10-15 NOTE — PROGRESS NOTES
[facility]  Lymphedema Clinic and Cancer Rehabilitation  [address] [facility]  [address]  Abdiaziz Mendoza        LYmphedema Therapy  Visit: 5    [x]                  Daily note               []                 30 day/ visit progress note    NAME: Dandre Castellanos  DATE: 10/15/2020    GOALS  Short term goals  Time frame: 6 weeks  1. Instruct the patient to be independent with proper skin care to prevent future skin breakdown and decrease the potential risk for infections that are associated with Lymphedema. 2. Patient will be independent with a personal lymphedema exercise program to assist with the lymphatic flow and reduce limb volume by 5343-3741 mL L LE, 500-1000 mL R LE.  3. Patient will understand the signs and symptoms of acute infection. Long term goals  Time frame: 6-12 weeks  1. Patient will have knowledge of the compression options and acquire a safe and       appropriate daytime and night time compression system to prevent             re-accumulation of fluid. 2.  Patient or family will be able to don/doff garments independently. Garment   system effectiveness will be evaluated prior to discharge for better long-term  management and outcomes. 3.  Improvement in functional outcomes measure by 10% to allow for overall improvement in mobility with reduced pain. 4.   To transition patient to independent restorative phase of CDT. SUBJECTIVE REPORT:  Patient arrives to treatment with L LE compression bandaging in place. Patient agreeable to proceeding with treatment including vasopneumatic pump trial with [employer] rep. Pain: 3/10, limb heaviness.                                 Gait:    [x]  Independent gait without any assistive device for community distances  ADLs: indep  Treatment Response:    [x]   Patient reviewed packet received at evaluation  [x]   Patient completed home program as prescribed  []   Patient not fully compliant with home program to date  []   Patient waiting on compression garment arrival  Function:  []   Patient requiring less assistance with completion of home program  []   ADLs are requiring less assistance   []   Patient able to return to work/leisure activities    TREATMENT AND OBJECTIVE DATA SUMMARY:   Patient/Family Education:      Educated in skin care: [x]   Skin care products  [x]   Hygiene  [x]  Prevention of cellulitis  []   Wound care     Educated in exercise: []   Walking program  []   Bambi ball routine  []   Stick routine  [x]   ROM routine     Instructed in self MLD:   Written sequence given and reviewed with patient as well as demonstration and instruction during MLD portion of the session. Instructed in don/doff of compression system:   [x]   Multi layer bandage (MLB) donning principles and wear precautions  []   Day garments  []   Night garments       Therapeutic Exercise:      Free exercises/ROM: Patient to continue ankle pumps/circumduction/toe flex and extension x 10/day, 2-3x/day as tolerated. Home program: Patient to perform daily to BID:  [x]   Skin care  [x]   Deep abdominal breathing  [x]   Exercise routine  []   Walking program  [x]   Rest in supine   [x]   Compression bandage  []   Compression garments  []   Vasopneumatic device  []   Wound care  [x]   Self MLD  [x]   Bring supplies to each therapy visit  [x]   Purchase necessary supplies  []   Weight loss program  []   Follow up with       Rationale: Exercise will increase the lymph angiomotoricity and tissue pressure of the skin and thus decrease swelling.        Vasopneumatic pump:12:40-1:40 pm  Manual therapy: 1:45-2:20 pm 60 minutes  35 minutes     Area to decongest:    [] UE          []  Right     []  Left      [] Trunk      [x] LE             []  Right     [x]  Left      [] Trunk         Sequence used and effectiveness:  Deferred MLD [] Secondary/Primary sequence for upper extremity with / without trunk involvement    [] Primary sequence for lower extremities with trunk involvement: diaphragmatic breathing x 10, full L LE MLD sequence. Stim L axillary/inguinal nodes, activated L IA anastomosis. [] Modifications were made to manual lymph drainage sequence to exclude cervical techniques secondary to patient's age    [x] Self MLD sequence/techniques/hand strokes   Skin/wound care/debridement:  Manual therapy Blunt debridement with forceps of fungal crusting lower leg L LE/toes. Anti-fungal ointment  bilateral toes. Applied anti-fungal ointment affected area lower leg/toes/dorsal foot L. Eucerin lotion applied upward strokes L lower leg avoiding fungal infected area. Upper/Lower extremity compression: Applied multi-layer compression bandaging to: Below knee [x]  Thigh high  []   Upper Extremity []    Right []   Left [x]    Bilateral []    The following multi-layer bandages were applied:  []  Tricofix            []  Silver Stockinette             []  Tg soft  [x]  Protouch    [x]  Transelast-toe wrap    Padding layer:  [x]  Cast padding: foot/ankle   [x]  Fleece: ankle/calf     Foam:  [] 10cm roll  [] 12cm roll  [] 15cm roll  [] Gray foam  [] Komprex  [] Komprex 2      Short stretch bandages:   [] 4cm  [x] 6cm  [x] 8cm  [] 10cm  [x] 12cm  Educated patient in multi-layer bandaging donning principles and precautions. Compression bandaging instructions:  1. Compression bandaging will be applied twice a week by therapist in clinic, with adjustments to be made at home as indicated if bandaging becomes loose or uncomfortable.     2. If tolerated, remain bandaged between appointments with therapist, removing under the following conditionsDO NOT WAIT FOR A RETURN PHONE Nixon 69:    -Numbness/tingling in extremity different from what you have experienced without the bandages in place.  -Compromise in circulation, monitoring blood refill into toes after applying gentle pressure to toes.  -Onset of pain in extremity that is sudden and severe in nature. -Redness, warmth in limb, and/or fever, flu-like symptoms, which may indicate infection. If this occurs, call your doctor right away. If you note a sudden increase in swelling in extremity, with or without redness/warmth, go to the emergency room as soon as possible to have blood clot ruled out. 3. Compression bandaging supplies that can be laundered are the brown compression wraps and any foam applied for padding. Launder in washer/dryer with NO fabric softener, bleach, woolite. Dry on a low heat. 4. Once compression garments are ordered, compression bandaging will be continued until garments arrive for fitting. This process can take several weeks. Vasopneumatic pump: [employer] pump rep demonstrated [id number] pump in clinic. This addressed LE lymphedema only. Patient has abdominal and genital lymphedema involvement, primary in nature. Patient requires use of  pump to treat trunk due to lymphedema pattern including truncal involvement. Demo of left leg  Pre Entre. Post Entre                   Thigh    57                       57.6                   Knee    44                        44.9                    Calf    39.5                    41                   Ankle     32                       31.5      Girth/Volume measurement: See girth measurements below. TOTAL TREATMENT 100 mins       ASSESSMENT:   Treatment effectiveness and tolerance: Patient tolerated vasopneumatic pump trial/MLB well. Patient provided with donated bandaging supplies due to patient reporting unable to afford supplies due to being unemployed. Patient instructed in [facility] management between treatments as noted above. Progress toward goals: Ongoing. PLAN OF CARE:   Changes to the plan of care: 1. Assess response to MLB  2. Continue MLD/MLB/Skin care. 3. Progress HEP.    Frequency: [x]  2 times a week  []  Weekly  []  Biweekly  []  Monthly           Elisabet Resendiz PT, CLT-CHRISTIE

## 2020-10-19 ENCOUNTER — APPOINTMENT (OUTPATIENT)
Dept: PHYSICAL THERAPY | Age: 50
End: 2020-10-19
Payer: COMMERCIAL

## 2020-10-22 ENCOUNTER — HOSPITAL ENCOUNTER (OUTPATIENT)
Dept: PHYSICAL THERAPY | Age: 50
Discharge: HOME | End: 2020-10-22
Payer: COMMERCIAL

## 2020-10-22 PROCEDURE — 97530 THERAPEUTIC ACTIVITIES: CPT

## 2020-10-22 PROCEDURE — 97140 MANUAL THERAPY 1/> REGIONS: CPT

## 2020-10-22 NOTE — PROGRESS NOTES
[facility]  Lymphedema Clinic and Cancer Rehabilitation  [address] [place] [address]  [address]  Abdiaziz Mendoza        LYmphedema Therapy  Visit: 6    [x]                  Daily note               []                 30 day/10th visit progress note    NAME: Lillian Delong  DATE: 10/22/2020    GOALS  Short term goals  Time frame: 6 weeks  1. Instruct the patient to be independent with proper skin care to prevent future skin breakdown and decrease the potential risk for infections that are associated with Lymphedema. 2. Patient will be independent with a personal lymphedema exercise program to assist with the lymphatic flow and reduce limb volume by 8333-5024 mL L LE, 500-1000 mL R LE. 10/22/2020 ongoing  3. Patient will understand the signs and symptoms of acute infection. Long term goals  Time frame: 6-12 weeks  1. Patient will have knowledge of the compression options and acquire a safe and       appropriate daytime and night time compression system to prevent             re-accumulation of fluid. 2.  Patient or family will be able to don/doff garments independently. Garment   system effectiveness will be evaluated prior to discharge for better long-term  management and outcomes. 3.  Improvement in functional outcomes measure by 10% to allow for overall improvement in mobility with reduced pain. 4.   To transition patient to independent restorative phase of CDT. SUBJECTIVE REPORT:  Patient arrives to treatment with L LE compression bandaging in place. States she cancelled previous visit due to allergy symptoms. States she is feeling better. Patient agreeable to proceeding with treatment. Pain: 3/10, limb heaviness.                                 Gait:    [x]  Independent gait without any assistive device for community distances  ADLs: indep  Treatment Response:    [x]   Patient reviewed packet received at evaluation  [x]   Patient completed home program as prescribed  []   Patient not fully compliant with home program to date  []   Patient waiting on compression garment arrival  Function:  []   Patient requiring less assistance with completion of home program  []   ADLs are requiring less assistance   []   Patient able to return to work/leisure activities    TREATMENT AND OBJECTIVE DATA SUMMARY:   Patient/Family Education:      Educated in skin care: [x]   Skin care products  [x]   Hygiene  [x]  Prevention of cellulitis  []   Wound care     Educated in exercise: []   Walking program  []   Bambi ball routine  []   Stick routine  [x]   ROM routine     Instructed in self MLD:   Written sequence given and reviewed with patient as well as demonstration and instruction during MLD portion of the session. Instructed in don/doff of compression system:   [x]   Multi layer bandage (MLB) donning principles and wear precautions  []   Day garments  []   Night garments       Therapeutic Exercise:      Free exercises/ROM: Patient to continue ankle pumps/circumduction/toe flex and extension x 10/day, 2-3x/day as tolerated. Home program: Patient to perform daily to BID:  [x]   Skin care  [x]   Deep abdominal breathing  [x]   Exercise routine  []   Walking program  [x]   Rest in supine   [x]   Compression bandage  []   Compression garments  []   Vasopneumatic device  []   Wound care  [x]   Self MLD  [x]   Bring supplies to each therapy visit  [x]   Purchase necessary supplies  []   Weight loss program  []   Follow up with       Rationale: Exercise will increase the lymph angiomotoricity and tissue pressure of the skin and thus decrease swelling.        Manual therapy: 12:48-1:47 pm  Therapeutic activity: 1:47-1:58 pm 59 minutes  11 minutes     Area to decongest:    [] UE          []  Right     []  Left      [] Trunk      [x] LE             []  Right     [x]  Left      [] Trunk         Sequence used and effectiveness:  Manual therapy:   [] Secondary/Primary sequence for upper extremity with / without trunk involvement    [x] Primary sequence for lower extremities with trunk involvement: diaphragmatic breathing x 10, full L LE MLD sequence. Stim L axillary/inguinal nodes, activated L IA anastomosis. [] Modifications were made to manual lymph drainage sequence to exclude cervical techniques secondary to patient's age    [x] Self MLD sequence/techniques/hand strokes   Skin/wound care/debridement:   Blunt debridement with forceps of fungal crusting lower leg L LE/toes. Anti-fungal ointment  bilateral toes. Applied anti-fungal ointment affected area lower leg/toes/dorsal foot L. Aquaphor applied upward strokes L lower leg avoiding fungal infected area. Upper/Lower extremity compression:  Therapeutic activity: Applied multi-layer compression bandaging to: Below knee [x]  Thigh high  []   Upper Extremity []    Right []   Left [x]    Bilateral []    The following multi-layer bandages were applied:  []  Tricofix            []  Silver Stockinette             []  Tg soft  [x]  Protouch    [x]  Transelast-toe wrap    Padding layer:  [x]  Cast padding: foot/ankle/calf  []  Fleece:     Foam:  [] 10cm roll  [] 12cm roll  [] 15cm roll  [] Gray foam  [] Komprex  [x] Komprex 2: lateral/medial ankle      Short stretch bandages:   [] 4cm  [x] 6cm  [x] 8cm  [] 10cm  [x] 12cm  Educated patient in multi-layer bandaging donning principles and precautions. Compression bandaging instructions:  1. Compression bandaging will be applied twice a week by therapist in clinic, with adjustments to be made at home as indicated if bandaging becomes loose or uncomfortable.     2. If tolerated, remain bandaged between appointments with therapist, removing under the following conditionsDO NOT WAIT FOR A RETURN PHONE Nixon 69:    -Numbness/tingling in extremity different from what you have experienced without the bandages in place.  -Compromise in circulation, monitoring blood refill into toes after applying gentle pressure to toes.  -Onset of pain in extremity that is sudden and severe in nature. -Redness, warmth in limb, and/or fever, flu-like symptoms, which may indicate infection. If this occurs, call your doctor right away. If you note a sudden increase in swelling in extremity, with or without redness/warmth, go to the emergency room as soon as possible to have blood clot ruled out. 3. Compression bandaging supplies that can be laundered are the brown compression wraps and any foam applied for padding. Launder in washer/dryer with NO fabric softener, bleach, woolite. Dry on a low heat. 4. Once compression garments are ordered, compression bandaging will be continued until garments arrive for fitting. This process can take several weeks. Girth/Volume measurement: Repeated limb volume measurements L LE with results as follows:                                 Right                      Left  10/22/2020         11,207. 69 mL      11,784.74 mL  9/25/2020           11,207. 69 mL      12,575.14 mL     TOTAL TREATMENT 70 mins         ASSESSMENT:   Treatment effectiveness and tolerance: Patient tolerated MLD/MLB well. Patient provided with donated bandaging supplies due to patient reporting unable to afford supplies due to being unemployed. Patient instructed in [facility] management between treatments as noted above. Progress toward goals: Ongoing. PLAN OF CARE:   Changes to the plan of care: 1. Assess response to MLD/MLB  2. Continue MLD/MLB/Skin care. 3. Progress HEP.    Frequency: [x]  2 times a week  []  Weekly  []  Biweekly  []  Monthly           Magdalena Plasencia PT, KAELA

## 2020-10-26 ENCOUNTER — HOSPITAL ENCOUNTER (OUTPATIENT)
Dept: PHYSICAL THERAPY | Age: 50
Discharge: HOME | End: 2020-10-26
Payer: COMMERCIAL

## 2020-10-26 PROCEDURE — 97530 THERAPEUTIC ACTIVITIES: CPT

## 2020-10-26 PROCEDURE — 97140 MANUAL THERAPY 1/> REGIONS: CPT

## 2020-10-26 NOTE — PROGRESS NOTES
[facility]  Lymphedema Clinic and Cancer Rehabilitation  [address] [place] [address]  [address]  Abdiaziz Mendoza        LYmphedema Therapy  Visit: 7    [x]                  Daily note               []                 30 day/10th visit progress note    NAME: Chato Wilkins  DATE: 10/26/2020    GOALS  Short term goals  Time frame: 6 weeks  1. Instruct the patient to be independent with proper skin care to prevent future skin breakdown and decrease the potential risk for infections that are associated with Lymphedema. 2. Patient will be independent with a personal lymphedema exercise program to assist with the lymphatic flow and reduce limb volume by 1760-9349 mL L LE, 500-1000 mL R LE. 10/22/2020 ongoing  3. Patient will understand the signs and symptoms of acute infection. Long term goals  Time frame: 6-12 weeks  1. Patient will have knowledge of the compression options and acquire a safe and       appropriate daytime and night time compression system to prevent             re-accumulation of fluid. 2.  Patient or family will be able to don/doff garments independently. Garment   system effectiveness will be evaluated prior to discharge for better long-term  management and outcomes. 3.  Improvement in functional outcomes measure by 10% to allow for overall improvement in mobility with reduced pain. 4.   To transition patient to independent restorative phase of CDT. SUBJECTIVE REPORT:  Patient arrives to treatment with L LE compression bandaging in place. States she is feeling better. Patient agreeable to proceeding with treatment. Pain: 3/10, limb heaviness.                                 Gait:    [x]  Independent gait without any assistive device for community distances  ADLs: indep  Treatment Response:    [x]   Patient reviewed packet received at evaluation  [x]   Patient completed home program as prescribed  []   Patient not fully compliant with home program to date  []   Patient waiting on compression garment arrival  Function:  []   Patient requiring less assistance with completion of home program  []   ADLs are requiring less assistance   []   Patient able to return to work/leisure activities    TREATMENT AND OBJECTIVE DATA SUMMARY:   Patient/Family Education:      Educated in skin care: [x]   Skin care products  [x]   Hygiene  [x]  Prevention of cellulitis  []   Wound care     Educated in exercise: []   Walking program  []   Bambi ball routine  []   Stick routine  [x]   ROM routine     Instructed in self MLD:   Written sequence given and reviewed with patient as well as demonstration and instruction during MLD portion of the session. Instructed in don/doff of compression system:   [x]   Multi layer bandage (MLB) donning principles and wear precautions  []   Day garments  []   Night garments       Therapeutic Exercise:      Free exercises/ROM: Patient to continue ankle pumps/circumduction/toe flex and extension x 10/day, 2-3x/day as tolerated. Home program: Patient to perform daily to BID:  [x]   Skin care  [x]   Deep abdominal breathing  [x]   Exercise routine  []   Walking program  [x]   Rest in supine   [x]   Compression bandage  []   Compression garments  []   Vasopneumatic device  []   Wound care  [x]   Self MLD  [x]   Bring supplies to each therapy visit  [x]   Purchase necessary supplies  []   Weight loss program  []   Follow up with       Rationale: Exercise will increase the lymph angiomotoricity and tissue pressure of the skin and thus decrease swelling.        Manual therapy: 12:50-1:35 pm  Therapeutic activity: 1:35-2:03 pm 45 minutes  28 minutes     Area to decongest:    [] UE          []  Right     []  Left      [] Trunk      [x] LE             []  Right     [x]  Left      [] Trunk         Sequence used and effectiveness:  Manual therapy:   [] Secondary/Primary sequence for upper extremity with / without trunk involvement    [x] Primary sequence for lower extremities with trunk involvement: diaphragmatic breathing x 10, full bilateral LE MLD sequence. Stim bilateral axillary/inguinal nodes. [] Modifications were made to manual lymph drainage sequence to exclude cervical techniques secondary to patient's age    [x] Self MLD sequence/techniques/hand strokes   Skin/wound care/debridement:   Eucerin/anti-itch lotion applied bilateral LE using upward strokes. Lower extremity compression:  Therapeutic activity: Applied multi-layer compression bandaging to: Below knee [x]  Thigh high  []   Upper Extremity []    Right []   Left [x]    Bilateral []    The following multi-layer bandages were applied:  []  Tricofix            []  Silver Stockinette             []  Tg soft  [x]  Protouch    [x]  Transelast-toe wrap    Padding layer:  [x]  Cast padding: foot/ankle/calf  []  Fleece:     Foam:  [] 10cm roll  [] 12cm roll  [] 15cm roll  [] Gray foam  [] Komprex  [x] Komprex 2: lateral/medial ankle      Short stretch bandages:   [] 4cm  [x] 6cm  [x] 8cm  [] 10cm  [x] 12cm    Profore wrap applied R lower leg, toe wraps, Komprex 2 dorsal foot. Educated patient in multi-layer bandaging donning principles and precautions. Compression bandaging instructions:  1. Compression bandaging will be applied twice a week by therapist in clinic, with adjustments to be made at home as indicated if bandaging becomes loose or uncomfortable. 2. If tolerated, remain bandaged between appointments with therapist, removing under the following conditionsDO NOT WAIT FOR A RETURN PHONE Nixon 69:    -Numbness/tingling in extremity different from what you have experienced without the bandages in place.  -Compromise in circulation, monitoring blood refill into toes after applying gentle pressure to toes.  -Onset of pain in extremity that is sudden and severe in nature. -Redness, warmth in limb, and/or fever, flu-like symptoms, which may indicate infection. If this occurs, call your doctor right away. If you note a sudden increase in swelling in extremity, with or without redness/warmth, go to the emergency room as soon as possible to have blood clot ruled out. 3. Compression bandaging supplies that can be laundered are the brown compression wraps and any foam applied for padding. Launder in washer/dryer with NO fabric softener, bleach, woolite. Dry on a low heat. 4. Once compression garments are ordered, compression bandaging will be continued until garments arrive for fitting. This process can take several weeks. Girth/Volume measurement: Repeated limb volume measurements L LE with results as follows:                                 Right                      Left  10/22/2020         11,207. 69 mL      11,784.74 mL  9/25/2020           11,207. 69 mL      12,575.14 mL     TOTAL TREATMENT 73 mins         ASSESSMENT:   Treatment effectiveness and tolerance: Patient tolerated MLD/MLB L LE well. Initiated MLD/Profore wrap R lower legs Patient provided with donated bandaging supplies due to patient reporting unable to afford supplies due to being unemployed. Patient instructed in [facility] management between treatments as noted above. Progress toward goals: Ongoing. PLAN OF CARE:   Changes to the plan of care: 1. Assess response to MLD/MLB L LE; MLD/Profore wrap R LE.  2. Continue MLD/MLB/Skin care. 3. Progress HEP.    Frequency: [x]  2 times a week  []  Weekly  []  Biweekly  []  Monthly           Pearl Tariq PT, KAELA

## 2020-10-29 ENCOUNTER — APPOINTMENT (OUTPATIENT)
Dept: PHYSICAL THERAPY | Age: 50
End: 2020-10-29
Payer: COMMERCIAL

## 2020-11-02 ENCOUNTER — APPOINTMENT (OUTPATIENT)
Dept: PHYSICAL THERAPY | Age: 50
End: 2020-11-02
Payer: COMMERCIAL

## 2020-11-05 ENCOUNTER — APPOINTMENT (OUTPATIENT)
Dept: PHYSICAL THERAPY | Age: 50
End: 2020-11-05
Payer: COMMERCIAL

## 2020-11-09 ENCOUNTER — APPOINTMENT (OUTPATIENT)
Dept: PHYSICAL THERAPY | Age: 50
End: 2020-11-09
Payer: COMMERCIAL

## 2020-11-12 ENCOUNTER — APPOINTMENT (OUTPATIENT)
Dept: PHYSICAL THERAPY | Age: 50
End: 2020-11-12
Payer: COMMERCIAL

## 2020-11-17 ENCOUNTER — HOSPITAL ENCOUNTER (OUTPATIENT)
Dept: PHYSICAL THERAPY | Age: 50
Discharge: HOME | End: 2020-11-17
Payer: COMMERCIAL

## 2020-11-17 PROCEDURE — 97140 MANUAL THERAPY 1/> REGIONS: CPT

## 2020-11-17 NOTE — PROGRESS NOTES
[facility]  Lymphedema Clinic and Cancer Rehabilitation  [address] [facility]  [address] [address]  Abdiaziz Mendoza        LYmphedema Therapy  Visit: 8    [x]                  Daily note               []                 30 day/10th visit progress note    NAME: Lillian Delong  DATE: 11/17/2020    GOALS  Short term goals  Time frame: 6 weeks  1. Instruct the patient to be independent with proper skin care to prevent future skin breakdown and decrease the potential risk for infections that are associated with Lymphedema. 2. Patient will be independent with a personal lymphedema exercise program to assist with the lymphatic flow and reduce limb volume by 4608-9726 mL L LE, 500-1000 mL R LE. 10/22/2020 ongoing  3. Patient will understand the signs and symptoms of acute infection. Long term goals  Time frame: 6-12 weeks  1. Patient will have knowledge of the compression options and acquire a safe and       appropriate daytime and night time compression system to prevent             re-accumulation of fluid. 2.  Patient or family will be able to don/doff garments independently. Garment   system effectiveness will be evaluated prior to discharge for better long-term  management and outcomes. 3.  Improvement in functional outcomes measure by 10% to allow for overall improvement in mobility with reduced pain. 4.   To transition patient to independent restorative phase of CDT. SUBJECTIVE REPORT:  Patient returns to treatment today with clean bandaging supplies. States she has missed appointments secondary to her mother being ill. Reports she postponed surgery to repair abdominal hernia due to her mother's medical concerns. States her mother is doing better. Patient reports that she knows her leg has gotten worse being out of bandaging, agreeable to proceeding with treatment today, understanding need to reduce L LE prior to ordering compression garments. Pain: 3/10, limb heaviness. Gait:    [x]  Independent gait without any assistive device for community distances  ADLs: indep  Treatment Response:    [x]   Patient reviewed packet received at evaluation  [x]   Patient completed home program as prescribed  []   Patient not fully compliant with home program to date  []   Patient waiting on compression garment arrival  Function:  []   Patient requiring less assistance with completion of home program  []   ADLs are requiring less assistance   []   Patient able to return to work/leisure activities    TREATMENT AND OBJECTIVE DATA SUMMARY:   Patient/Family Education:      Educated in skin care: [x]   Skin care products  [x]   Hygiene  [x]  Prevention of cellulitis  []   Wound care     Educated in exercise: []   Walking program  []   Bambi ball routine  []   Stick routine  [x]   ROM routine     Instructed in self MLD:   Written sequence given and reviewed with patient as well as demonstration and instruction during MLD portion of the session. Instructed in don/doff of compression system:   [x]   Multi layer bandage (MLB) donning principles and wear precautions  []   Day garments  []   Night garments       Therapeutic Exercise:      Free exercises/ROM: Patient to continue ankle pumps/circumduction/toe flex and extension x 10/day, 2-3x/day as tolerated. Home program: Patient to perform daily to BID:  [x]   Skin care  [x]   Deep abdominal breathing  [x]   Exercise routine  []   Walking program  [x]   Rest in supine   [x]   Compression bandage  []   Compression garments  []   Vasopneumatic device  []   Wound care  [x]   Self MLD  [x]   Bring supplies to each therapy visit  [x]   Purchase necessary supplies  []   Weight loss program  []   Follow up with       Rationale: Exercise will increase the lymph angiomotoricity and tissue pressure of the skin and thus decrease swelling.        Manual therapy: 12:58-2:00 pm   62 minutes       Area to decongest:    [] UE          []  Right     []  Left      [] Trunk      [x] LE             []  Right     [x]  Left      [] Trunk         Sequence used and effectiveness:  Manual therapy:   [] Secondary/Primary sequence for upper extremity with / without trunk involvement    [x] Primary sequence for lower extremities with trunk involvement: diaphragmatic breathing x 10, full bilateral LE MLD sequence. Stim bilateral axillary/inguinal nodes. [] Modifications were made to manual lymph drainage sequence to exclude cervical techniques secondary to patient's age    [x] Self MLD sequence/techniques/hand strokes   Skin/wound care/debridement:   Eucerin/anti-itch lotion applied bilateral LE using upward strokes. Lower extremity compression:   Applied multi-layer compression bandaging to: Below knee [x]  Thigh high  []   Upper Extremity []    Right []   Left [x]    Bilateral []    The following multi-layer bandages were applied:  []  Tricofix            []  Silver Stockinette             []  Tg soft  [x]  Protouch    []  Transelast-toe wrap    Padding layer:  [x]  Cast padding: foot/ankle/calf  [x]  Fleece: ankle/calf    Foam:  [] 10cm roll  [] 12cm roll  [] 15cm roll  [] Gray foam  [] Komprex  [] Komprex 2: lateral/medial ankle      Short stretch bandages:   [x] Coban: forefoot over cast padding and stockinette  [x] 6cm  [x] 8cm  [x] 10cm  [] 12cm        Educated patient in multi-layer bandaging donning principles and precautions. Compression bandaging instructions:  1. Compression bandaging will be applied twice a week by therapist in clinic, with adjustments to be made at home as indicated if bandaging becomes loose or uncomfortable.     2. If tolerated, remain bandaged between appointments with therapist, removing under the following conditionsDO NOT WAIT FOR A RETURN PHONE Nixon 69:    -Numbness/tingling in extremity different from what you have experienced without the bandages in place.  -Compromise in circulation, monitoring blood refill into toes after applying gentle pressure to toes.  -Onset of pain in extremity that is sudden and severe in nature. -Redness, warmth in limb, and/or fever, flu-like symptoms, which may indicate infection. If this occurs, call your doctor right away. If you note a sudden increase in swelling in extremity, with or without redness/warmth, go to the emergency room as soon as possible to have blood clot ruled out. 3. Compression bandaging supplies that can be laundered are the brown compression wraps and any foam applied for padding. Launder in washer/dryer with NO fabric softener, bleach, woolite. Dry on a low heat. 4. Once compression garments are ordered, compression bandaging will be continued until garments arrive for fitting. This process can take several weeks. Girth/Volume measurement: Repeated limb volume measurements L LE with results as follows:                                 Right                      Left  11/17/2020         91108. 34 mL       65424.87 mL  10/22/2020         11,207. 69 mL      11,784.74 mL  9/25/2020           11,207. 69 mL      12,575.14 mL    Limb volume discrepancy of 14.66%, with bilateral LE volumes noted to be elevated greater than at time of initial evaluation. TOTAL TREATMENT 62 mins         ASSESSMENT:   Treatment effectiveness and tolerance: Patient tolerated MLD/MLB L LE well. Patient provided with donated bandaging supplies due to patient reporting unable to afford supplies due to being unemployed. Patient instructed in [facility] management between treatments as noted above. At conclusion of treatment, patient reports need to cancel appointment on 11/19/2020 secondary to her daughter having wisdom teeth removed. Agreeable to rescheduling for 11/20/2020 for bandaging only appointment.   Patient advised that consistent treatment is required for effective management of lymphedema, to allow patient to be fit with appropriate compression garments for home management. Progress toward goals: Ongoing. PLAN OF CARE:   Changes to the plan of care: 1. Assess response to MLD/MLB L LE  2. Continue MLD/MLB/Skin care. 3. Progress HEP.    Frequency: [x]  2 times a week  []  Weekly  []  Biweekly  []  Monthly           Kasia Castellon PT, LION-CHRISTIE

## 2020-11-19 ENCOUNTER — APPOINTMENT (OUTPATIENT)
Dept: PHYSICAL THERAPY | Age: 50
End: 2020-11-19
Payer: COMMERCIAL

## 2020-11-20 ENCOUNTER — HOSPITAL ENCOUNTER (OUTPATIENT)
Dept: PHYSICAL THERAPY | Age: 50
Discharge: HOME | End: 2020-11-20
Payer: COMMERCIAL

## 2020-11-20 PROCEDURE — 97140 MANUAL THERAPY 1/> REGIONS: CPT

## 2020-11-20 NOTE — PROGRESS NOTES
[facility]  Lymphedema Clinic and Cancer Rehabilitation  [facility]  [id number] [place] [address] [address]  Abdiaziz Mendoza        LYmphedema Therapy  Visit: 9    [x]                  Daily note               []                 30 day/10th visit progress note    NAME: Celestine Manzano  DATE: 11/20/2020    GOALS  Short term goals  Time frame: 6 weeks  1. Instruct the patient to be independent with proper skin care to prevent future skin breakdown and decrease the potential risk for infections that are associated with Lymphedema. 2. Patient will be independent with a personal lymphedema exercise program to assist with the lymphatic flow and reduce limb volume by 5948-1983 mL L LE, 500-1000 mL R LE. 10/22/2020 ongoing  3. Patient will understand the signs and symptoms of acute infection. Long term goals  Time frame: 6-12 weeks  1.   Patient will have knowledge of the compression options and acquire a safe and       appropriate daytime and night time compression system to prevent             re-accumulation of fluid. 2.  Patient or family will be able to don/doff garments independently.  Garment   system effectiveness will be evaluated prior to discharge for better long-term  management and outcomes. 3.  Improvement in functional outcomes measure by 10% to allow for overall improvement in mobility with reduced pain. 4.   To transition patient to independent restorative phase of CDT.          SUBJECTIVE REPORT:  Agreeable to MLB on Left LE. Pain: 3/10, limb heaviness. Gait:    [x]? Independent gait without any assistive device for community distances  ADLs: indep  Treatment Response:    [x]? Patient reviewed packet received at evaluation  [x]? Patient completed home program as prescribed  []? Patient not fully compliant with home program to date  []? Patient waiting on compression garment arrival  Function:  []?    Patient requiring less assistance with completion of home program  []? ADLs are requiring less assistance   []? Patient able to return to work/leisure activities         TREATMENT AND OBJECTIVE DATA SUMMARY:   Patient/Family Education:        Educated in skin care: [x]? Skin care products  [x]? Hygiene  [x]? Prevention of cellulitis  []? Wound care      Educated in exercise: []? Walking program  []? Bambi ball routine  []? Stick routine  [x]? ROM routine      Instructed in self MLD:    Written sequence given and reviewed with patient as well as demonstration and instruction during MLD portion of the session.      Instructed in don/doff of compression system:    [x]? Multi layer bandage (MLB) donning principles and wear precautions  []? Day garments  []? Night garments         Therapeutic Exercise:        Free exercises/ROM: Patient to continue ankle pumps/circumduction/toe flex and extension x 10/day, 2-3x/day as tolerated. Home program: Patient to perform daily to BID:  [x]? Skin care  [x]? Deep abdominal breathing  [x]? Exercise routine  []? Walking program  [x]? Rest in supine   [x]? Compression bandage  []? Compression garments  []? Vasopneumatic device  []? Wound care  [x]? Self MLD  [x]? Bring supplies to each therapy visit  [x]? Purchase necessary supplies  []? Weight loss program  []? Follow up with         Rationale: Exercise will increase the lymph angiomotoricity and tissue pressure of the skin and thus decrease swelling.         Manual therapy: 2:00-2:30 pm    30 minutes         Area to decongest:    []? UE          []? Right     []? Left      []? Trunk        [x]? LE             []?  Right     [x]? Left      []? Trunk            Sequence used and effectiveness:  Manual therapy:    []? Secondary/Primary sequence for upper extremity with / without trunk involvement     []?  Primary sequence for lower extremities with trunk involvement: diaphragmatic breathing x 10, full bilateral LE MLD sequence. Stim bilateral axillary/inguinal nodes.     []? Modifications were made to manual lymph drainage sequence to exclude cervical techniques secondary to patient's age     [x]? Self MLD sequence/techniques/hand strokes   Skin/wound care/debridement:    Eucerin/anti-itch lotion applied bilateral LE using upward strokes. Lower extremity compression:    Applied multi-layer compression bandaging to:     Below knee [x]? Thigh high  []? Upper Extremity []?     Right []? Left [x]? Bilateral []?     The following multi-layer bandages were applied:  []? Tricofix            []? Silver Stockinette             []?  Tg soft  [x]? Protouch     []? Transelast-toe wrap     Padding layer:  [x]? Cast padding: foot/ankle/calf  [x]? Fleece: ankle/calf     Foam:  []? 10cm roll  []? 12cm roll  []? 15cm roll  []? Belvin Hunting foam  []? Komprex  []? Komprex 2: lateral/medial ankle        Short stretch bandages:   [x]? Coban: forefoot over cast padding and stockinette  [x]? 6cm  [x]? 8cm  [x]? 10cm  []? 12cm           Educated patient in multi-layer bandaging donning principles and precautions.     Compression bandaging instructions:  1. Compression bandaging will be applied twice a week by therapist in clinic, with adjustments to be made at home as indicated if bandaging becomes loose or uncomfortable. 2. If tolerated, remain bandaged between appointments with therapist, removing under the following conditionsDO NOT WAIT FOR A RETURN PHONE CALL FROM CLINIC:     -Numbness/tingling in extremity different from what you have experienced without the bandages in place.  -Compromise in circulation, monitoring blood refill into toes after applying gentle pressure to toes.  -Onset of pain in extremity that is sudden and severe in nature. -Redness, warmth in limb, and/or fever, flu-like symptoms, which may indicate infection. If this occurs, call your doctor right away.   If you note a sudden increase in swelling in extremity, with or without redness/warmth, go to the emergency room as soon as possible to have blood clot ruled out.       3. Compression bandaging supplies that can be laundered are the brown compression wraps and any foam applied for padding. Launder in washer/dryer with NO fabric softener, bleach, woolite. Dry on a low heat. 4. Once compression garments are ordered, compression bandaging will be continued until garments arrive for fitting. This process can take several weeks.            Girth/Volume measurement: Repeated limb volume measurements L LE with results as follows:                                 Right                      Left  11/17/2020         13752. 34 mL       59529.87 mL  10/22/2020         11,207. 69 mL      11,784.74 mL  9/25/2020           11,207. 69 mL      12,575.14 mL     Limb volume discrepancy of 14.66%, with bilateral LE volumes noted to be elevated greater than at time of initial evaluation.      TOTAL TREATMENT 30 mins       Affected Side:    Left (cm) Right (cm)   5th Tuberosity 22.5           Ankle 31.5           Calf 39           Mid Knee             Thigh             Groin             Length                      ASSESSMENT:   Treatment effectiveness and tolerance: Patient tolerated MLB to Left LE. Progress toward goals: Ongoing.          PLAN OF CARE:   Changes to the plan of care: 1. Assess response to MLD/MLB L LE  2. Continue MLD/MLB/Skin care. 3. Progress HEP. Frequency: [x]? 2 times a week  []? Weekly  []? Biweekly  []?   Monthly           Sonia Bush PT, DPT,CLT

## 2020-11-23 ENCOUNTER — APPOINTMENT (OUTPATIENT)
Dept: PHYSICAL THERAPY | Age: 50
End: 2020-11-23
Payer: COMMERCIAL

## 2020-11-30 ENCOUNTER — APPOINTMENT (OUTPATIENT)
Dept: PHYSICAL THERAPY | Age: 50
End: 2020-11-30
Payer: COMMERCIAL

## 2020-12-03 ENCOUNTER — VIRTUAL VISIT (OUTPATIENT)
Dept: FAMILY MEDICINE CLINIC | Age: 50
End: 2020-12-03
Payer: COMMERCIAL

## 2020-12-03 DIAGNOSIS — E55.9 HYPOVITAMINOSIS D: ICD-10-CM

## 2020-12-03 DIAGNOSIS — D25.9 UTERINE LEIOMYOMA, UNSPECIFIED LOCATION: ICD-10-CM

## 2020-12-03 DIAGNOSIS — I89.0 LYMPHEDEMA OF LEFT LEG: ICD-10-CM

## 2020-12-03 DIAGNOSIS — Z76.89 ESTABLISHING CARE WITH NEW DOCTOR, ENCOUNTER FOR: ICD-10-CM

## 2020-12-03 DIAGNOSIS — E66.01 OBESITY, MORBID (HCC): ICD-10-CM

## 2020-12-03 DIAGNOSIS — K43.9 VENTRAL HERNIA WITHOUT OBSTRUCTION OR GANGRENE: Primary | ICD-10-CM

## 2020-12-03 PROCEDURE — 99214 OFFICE O/P EST MOD 30 MIN: CPT | Performed by: FAMILY MEDICINE

## 2020-12-03 RX ORDER — ERGOCALCIFEROL 1.25 MG/1
CAPSULE ORAL
COMMUNITY
Start: 2020-08-26 | End: 2022-10-31 | Stop reason: ALTCHOICE

## 2020-12-03 NOTE — PROGRESS NOTES
Lillian Delong is a 45 y.o. female who was seen by synchronous (real-time) audio-video technology on 12/3/2020. Consent: Lillian Delong, who was seen by synchronous (real-time) audio-video technology, and/or her healthcare decision maker, is aware that this patient-initiated, Telehealth encounter on 12/3/2020 is a billable service, with coverage as determined by her insurance carrier. She is aware that she may receive a bill and has provided verbal consent to proceed: Yes. Assessment & Plan:   1. Ventral hernia without obstruction or gangrene  Prev seeing Dr Jorge Roach but would prefer to be seen at [address] [place] [address] d/t transportation  Referral to Dr Yaritza Pelaez  Will req Dr Emery's consult note if possible  Pt was pending surgery will appreciate Dr Elissa Shahid for further care  - REFERRAL TO GENERAL SURGERY    2. Obesity, morbid ([facility])  Recommend healthy eating, weight loss if possible, small frequent meals with balanced eating, drinking water    3. Uterine leiomyoma, unspecified location  Mgmt per  [facility]    4. Hypovitaminosis D  Recent labs, will request, taking therapuetic treatment once weekly    5. Establishing care with new doctor, encounter for  utd on pap  Screening cscope coming up this month  utd on mammo  Declines vaccines today    6. Lymphedema  Mgmt right now with therapy          Pt was counseled on risks, benefits and alternatives of treatment options. All questions were asked and answered and the patient was agreeable with the treatment plan as outlined. Encounter time today was 30 minutes and more than 50% of this encounter was spent in counseling face-to-face regarding Diagnosis, Patient Education, Medication Management, Compliance and Impressions. Time documented includes face to face time, documentation and chart review if applicable except as noted. Subjective:    Lillian Delong is a 45 y.o. female who was seen for Establish Care and Second Opinion      Home: apt with daughter  Work: not presently working, but looking for work  Last primary doctor: about 3 weeks ago, Dr Quentin Beard on [facility]  Last dentist: not in the last year  Last eye doctor: (wearing glasses) --[employer]    Exercise: yes, walking at least 3 times a week  Diet: trying to eat healthy, she enjoys steak a few times a month    Tobacco: no  Etoh: 2-3 drinks per week and 3 beers per week  Illicit: no    SA: not presently    Last pap smear: Gynecology--Dr Joaquin Coombs UTD pap  Last mammo: utd    Colonoscopy: scheduled for 12/18--gastro doc is Dr Ankita Colorado at [facility]    Was scheduled with ventral hernia surgery repair with mesh  Had considered having surgery but needs to be in [facility] for convenience of transporation concern, would like a consultation with a surgeon who does practice at [facility]  She noticed this hernia in March of this year, no heavy lifting, was climbing a lot of stairs she reports, has L leg lymphedema and was hauling herself up the stairs sometimes  The patient reports to me that she doesn't really need to touch or manipulate her hernia, she never having pain at the site, but she is worried if it is not addressed she could develop worsening hernia or strangulation    Hx of vitamin D deficiency  Had labs at the beginning of November    Doing lymphedema therapy, she reports to me that she is seeing PT for this with wraps and she also recently got a pump at home to do treatments. She's developed this about 4 years ago. It is unilateral.  Medications, allergies, PMH, PSH, SOCH, [address] reviewed and updated per routine protocol, see chart for review and changes if not noted here. ROS  A 12 point review of systems was negative except as noted here or in the HPI.     Objective:   Vital Signs: (As obtained by patient/caregiver at home)  Patient-Reported Vitals 12/3/2020   Patient-Reported Weight 208lb        [INSTRUCTIONS:  \"[x]\" Indicates a positive item  \"[]\" Indicates a negative item  -- DELETE ALL ITEMS NOT EXAMINED]    Constitutional: [x] Appears well-developed and well-nourished [x] No apparent distress      [] Abnormal -     Mental status: [x] Alert and awake  [x] Oriented to person/place/time [x] Able to follow commands    [] Abnormal -     Eyes:   EOM    [x]  Normal    [] Abnormal -   Sclera  [x]  Normal    [] Abnormal -          Discharge [x]  None visible   [] Abnormal -     HENT: [x] Normocephalic, atraumatic  [] Abnormal -   [x] Mouth/Throat: Mucous membranes are moist    External Ears [x] Normal  [] Abnormal -    Neck: [x] No visualized mass [] Abnormal -     Pulmonary/Chest: [x] Respiratory effort normal   [x] No visualized signs of difficulty breathing or respiratory distress        [] Abnormal -      Musculoskeletal:   [] Normal gait with no signs of ataxia         [x] Normal range of motion of neck        [] Abnormal -     Neurological:        [x] No Facial Asymmetry (Cranial nerve 7 motor function) (limited exam due to video visit)          [x] No gaze palsy        [] Abnormal -          Skin:        [x] No significant exanthematous lesions or discoloration noted on facial skin         [] Abnormal -            Psychiatric:       [x] Normal Affect [] Abnormal -        [x] No Hallucinations    Other pertinent observable physical exam findings:seated at computer, wearing glasses    We discussed the expected course, resolution and complications of the diagnosis(es) in detail. Medication risks, benefits, costs, interactions, and alternatives were discussed as indicated. I advised her to contact the office if her condition worsens, changes or fails to improve as anticipated. She expressed understanding with the diagnosis(es) and plan. Johnson Kapadia is a 45 y.o. female who was evaluated by a video visit encounter for concerns as above. Patient identification was verified prior to start of the visit. A caregiver was present when appropriate.  Due to this being a TeleHealth encounter (During OEPGZ-39 public health emergency), evaluation of the following organ systems was limited: Vitals/Constitutional/EENT/Resp/CV/GI//MS/Neuro/Skin/Heme-Lymph-Imm. Pursuant to the emergency declaration under the [facility], [facility] waiver authority and the [employer] Act, this Virtual  Visit was conducted, with patient's (and/or legal guardian's) consent, to reduce the patient's risk of exposure to COVID-19 and provide necessary medical care. Services were provided through a video synchronous discussion virtually to substitute for in-person clinic visit. Patient and provider were located at their individual homes. Estefani Mcdaniels MD  [facility]  12/03/20 1:38 PM     Portions of this note may have been populated using smart dictation software and may have \"sounds-like\" errors present.

## 2020-12-03 NOTE — PROGRESS NOTES
Chief Complaint   Patient presents with   [facility] Second Opinion   1. Have you been to the ER, urgent care clinic since your last visit? Hospitalized since your last visit? No  2. Have you seen or consulted any other health care providers outside of the [address] [place] [address] since your last visit? Include any pap smears or colon screening.  No

## 2020-12-04 ENCOUNTER — TELEPHONE (OUTPATIENT)
Dept: FAMILY MEDICINE CLINIC | Age: 50
End: 2020-12-04

## 2020-12-04 NOTE — TELEPHONE ENCOUNTER
----- Message from Shazia Chapa MD sent at 12/3/2020  1:54 PM EST -----  Pls req recent office visit and labs from Dr Keiry Severino office

## 2020-12-04 NOTE — TELEPHONE ENCOUNTER
----- Message from Fred Maya MD sent at 12/3/2020  1:47 PM EST -----  Regarding: pls req consultation note  Pls req consult note from Dr Mary Garzon for ventral hernia for patient, she needs to transfer care into the [facility]

## 2020-12-08 ENCOUNTER — OFFICE VISIT (OUTPATIENT)
Dept: SURGERY | Age: 50
End: 2020-12-08
Payer: COMMERCIAL

## 2020-12-08 VITALS
SYSTOLIC BLOOD PRESSURE: 150 MMHG | WEIGHT: 212 LBS | BODY MASS INDEX: 39.01 KG/M2 | HEIGHT: 62 IN | HEART RATE: 96 BPM | TEMPERATURE: 98.7 F | DIASTOLIC BLOOD PRESSURE: 90 MMHG | RESPIRATION RATE: 18 BRPM | OXYGEN SATURATION: 96 %

## 2020-12-08 DIAGNOSIS — K43.6 INCARCERATED VENTRAL HERNIA: ICD-10-CM

## 2020-12-08 PROBLEM — I89.0 LYMPHEDEMA OF LEFT LOWER EXTREMITY: Status: ACTIVE | Noted: 2020-12-08

## 2020-12-08 PROBLEM — R73.03 PREDIABETES: Status: ACTIVE | Noted: 2020-12-08

## 2020-12-08 PROCEDURE — 99203 OFFICE O/P NEW LOW 30 MIN: CPT | Performed by: SURGERY

## 2020-12-08 NOTE — H&P (VIEW-ONLY)
Surgery History and Physical 
 
Subjective: Shital Diana is a 45 y.o. black female who presents for evaluation of a ventral hernia. A few months ago, Ms. Alec Jeffery noticed a bulge above her umbilicus. The bulge is always present and does not reduce. She is having mild discomfort when it is touched. She is eating fine and moving her bowels normally. She denies any previous hernia repairs or abdominal procedures. Her CT from 20 reveals a widemouth ventral hernia containing fat. She was seen previously by Dr. Oscar Deleon and was scheduled for surgery, but decided not to proceed. Past Medical History:  
Diagnosis Date  Anemia  Fibroids 2010 - embolization  Herpes genitalia   
 positive  Obesity, Class II, BMI 35-39.9  Prediabetes Past Surgical History:  
Procedure Laterality Date [address] WNL per patient  HX GYN  2011 Uterine fibroid embolization.  HX MYOMECTOMY  HX TONSILLECTOMY Family History Problem Relation Age of Onset  Diabetes Mother  Cancer Maternal Grandmother   
     ovary  Kidney Disease Maternal Grandmother  Cancer Other   
     endometrial  
 Cancer Other   
     aunt  Cancer Other  Diabetes Other Social History Tobacco Use  Smoking status: Former Smoker Packs/day: 0.20 Years: 5.00 Pack years: 1.00 Types: Cigarettes Last attempt to quit: 12/10/2011 Years since quittin.0  Smokeless tobacco: Never Used  Tobacco comment: 2-3 cigarettes a week Substance Use Topics  Alcohol use: Yes Alcohol/week: 2.5 standard drinks Types: 1 Glasses of wine, 1 Cans of beer, 1 Shots of liquor per week Comment: social   
  
Prior to Admission medications Medication Sig Start Date End Date Taking? Authorizing Provider ergocalciferol (ERGOCALCIFEROL) 1,250 mcg (50,000 unit) capsule TAKE 1 CAPSULE BY MOUTH ONE TIME PER WEEK 8/26/20  Yes Provider, Historical  
MULTIVITAMIN WITH MINERALS (HAIR,SKIN AND NAILS PO) Take  by mouth. Yes Provider, Historical  
ferrous sulfate (IRON) 325 mg (65 mg iron) tablet Take 1 Tab by mouth Daily (before breakfast). 11/22/13  Yes Darvin Lema MD  
omega-3 fatty acids-vitamin e (FISH OIL) 1,000 mg Cap Take 1 Cap by mouth. Yes Provider, Historical  
multivitamin,tx-iron-ca-min (THERA-M) 27-0.4 mg Tab Take 1 Tab by mouth every other day. 11/15/10  Yes Provider, Historical  
  
No Known Allergies Review of Systems: A comprehensive review of systems was negative except for that written in the History of Present Illness. Objective:  
 
 Physical Exam: 
GENERAL: alert, cooperative, no distress, appears stated age, EYE: negative findings: anicteric sclera, LYMPHATIC: Cervical, supraclavicular nodes normal. , THROAT & NECK: normal, LUNG: clear to auscultation bilaterally, HEART: regular rate and rhythm, ABDOMEN: Soft, obese, ND. There is a mildly tender incarcerated supraumbilical hernia., EXTREMITIES:  edema in left leg., SKIN: Normal., NEUROLOGIC: negative, PSYCHIATRIC: non focal 
 
Assessment:  
 
Incarcerated supraumbilical hernia without gangrene or obstruction. Plan: Ms. Brittnee Fisher would like to proceed with her hernia repair next week and is fine with any day. She will postpone her colonoscopy since her cologuard this year was negative. I discussed the risks of the procedure including bleeding, infection, wound healing problems, seroma, mesh complications, recurrent hernia, blood clots, injury to the bowel, and reaction to the prep or local and general anesthetic. She understands the risks; any and all questions were answered to the her satisfaction. Ms. Brittnee Fisher will be scheduled for an elective outpatient robotic-assisted laparoscopic supraumbilical herniorrhaphy with mesh, possible open under general anesthesia. The patient was counseled at length about the risks of raheel Covid-19 during their perioperative period and any recovery window from their procedure. The patient was made aware that raheel Covid-19  may worsen their prognosis for recovering from their procedure and lend to a higher morbidity and/or mortality risk. All material risks, benefits, and reasonable alternatives including postponing the procedure were discussed. The patient does wish to proceed with the procedure at this time.

## 2020-12-08 NOTE — PROGRESS NOTES
Surgery History and Physical    Subjective: Syliva Dance is a 45 y.o. black female who presents for evaluation of a ventral hernia. A few months ago, Ms. Xochilt Leal noticed a bulge above her umbilicus. The bulge is always present and does not reduce. She is having mild discomfort when it is touched. She is eating fine and moving her bowels normally. She denies any previous hernia repairs or abdominal procedures. Her CT from 20 reveals a widemouth ventral hernia containing fat. She was seen previously by Dr. Jose Roberto Tijerina and was scheduled for surgery, but decided not to proceed. Past Medical History:   Diagnosis Date    Anemia     Fibroids     2010 - embolization     Herpes genitalia     positive    Obesity, Class II, BMI 35-39.9     Prediabetes      Past Surgical History:   Procedure Laterality Date    HX COLPOSCOPY      WNL per patient    HX GYN  2011    Uterine fibroid embolization.  HX MYOMECTOMY      HX TONSILLECTOMY        Family History   Problem Relation Age of Onset    Diabetes Mother     Cancer Maternal Grandmother         ovary     Kidney Disease Maternal Grandmother     Cancer Other         endometrial    Cancer Other         aunt    Cancer Other     Diabetes Other      Social History     Tobacco Use    Smoking status: Former Smoker     Packs/day: 0.20     Years: 5.00     Pack years: 1.00     Types: Cigarettes     Last attempt to quit: 12/10/2011     Years since quittin.0    Smokeless tobacco: Never Used    Tobacco comment: 2-3 cigarettes a week   Substance Use Topics    Alcohol use: Yes     Alcohol/week: 2.5 standard drinks     Types: 1 Glasses of wine, 1 Cans of beer, 1 Shots of liquor per week     Comment: social       Prior to Admission medications    Medication Sig Start Date End Date Taking?  Authorizing Provider   ergocalciferol (ERGOCALCIFEROL) 1,250 mcg (50,000 unit) capsule TAKE 1 CAPSULE BY MOUTH ONE TIME PER WEEK 20  Yes Provider, Historical   MULTIVITAMIN WITH MINERALS (HAIR,SKIN AND NAILS PO) Take  by mouth. Yes Provider, Historical   ferrous sulfate (IRON) 325 mg (65 mg iron) tablet Take 1 Tab by mouth Daily (before breakfast). 11/22/13  Yes Oralia Teague MD   omega-3 fatty acids-vitamin e (FISH OIL) 1,000 mg Cap Take 1 Cap by mouth. Yes Provider, Historical   multivitamin,tx-iron-ca-min (THERA-M) 27-0.4 mg Tab Take 1 Tab by mouth every other day. 11/15/10  Yes Provider, Historical      No Known Allergies    Review of Systems:  A comprehensive review of systems was negative except for that written in the History of Present Illness. Objective:      Physical Exam:  GENERAL: alert, cooperative, no distress, appears stated age, EYE: negative findings: anicteric sclera, LYMPHATIC: Cervical, supraclavicular nodes normal. , THROAT & NECK: normal, LUNG: clear to auscultation bilaterally, HEART: regular rate and rhythm, ABDOMEN: Soft, obese, ND. There is a mildly tender incarcerated supraumbilical hernia., EXTREMITIES:  edema in left leg., SKIN: Normal., NEUROLOGIC: negative, PSYCHIATRIC: non focal    Assessment:     Incarcerated supraumbilical hernia without gangrene or obstruction. Plan:     Ms. Marian Granados would like to proceed with her hernia repair next week and is fine with any day. She will postpone her colonoscopy since her cologuard this year was negative. I discussed the risks of the procedure including bleeding, infection, wound healing problems, seroma, mesh complications, recurrent hernia, blood clots, injury to the bowel, and reaction to the prep or local and general anesthetic. She understands the risks; any and all questions were answered to the her satisfaction. Ms. Marian Granados will be scheduled for an elective outpatient robotic-assisted laparoscopic supraumbilical herniorrhaphy with mesh, possible open under general anesthesia.     The patient was counseled at length about the risks of raheel Covid-19 during their perioperative period and any recovery window from their procedure. The patient was made aware that raheel Covid-19  may worsen their prognosis for recovering from their procedure and lend to a higher morbidity and/or mortality risk. All material risks, benefits, and reasonable alternatives including postponing the procedure were discussed. The patient does wish to proceed with the procedure at this time.

## 2020-12-08 NOTE — PROGRESS NOTES
1. Have you been to the ER, urgent care clinic since your last visit? Hospitalized since your last visit? No    2. Have you seen or consulted any other health care providers outside of the [address] [place] [address] since your last visit? Include any pap smears or colon screening.  No

## 2020-12-09 ENCOUNTER — PATIENT MESSAGE (OUTPATIENT)
Dept: SURGERY | Age: 50
End: 2020-12-09

## 2020-12-09 ENCOUNTER — TELEPHONE (OUTPATIENT)
Dept: FAMILY MEDICINE CLINIC | Age: 50
End: 2020-12-09

## 2020-12-09 ENCOUNTER — APPOINTMENT (OUTPATIENT)
Dept: PHYSICAL THERAPY | Age: 50
End: 2020-12-09

## 2020-12-09 ENCOUNTER — VIRTUAL VISIT (OUTPATIENT)
Dept: FAMILY MEDICINE CLINIC | Age: 50
End: 2020-12-09
Payer: COMMERCIAL

## 2020-12-09 DIAGNOSIS — I89.0 LYMPHEDEMA: Primary | ICD-10-CM

## 2020-12-09 DIAGNOSIS — K43.6 INCARCERATED VENTRAL HERNIA: ICD-10-CM

## 2020-12-09 DIAGNOSIS — R03.0 ELEVATED BLOOD PRESSURE READING: ICD-10-CM

## 2020-12-09 PROCEDURE — 99213 OFFICE O/P EST LOW 20 MIN: CPT | Performed by: FAMILY MEDICINE

## 2020-12-09 NOTE — PROGRESS NOTES
Chief Complaint   Patient presents with    Follow-up     Needs Rx for shower bench. 1. Have you been to the ER, urgent care clinic since your last visit? Hospitalized since your last visit? No  2. Have you seen or consulted any other health care providers outside of the [address] [place] [address] since your last visit? Include any pap smears or colon screening.  No

## 2020-12-09 NOTE — TELEPHONE ENCOUNTER
----- Message from Raphael Lawrence MD sent at 12/9/2020 11:46 AM EST -----  Pt requesting that her shower chair erx order be sent to [address] [place] [address]    [phone] [facility] for fax (ATTN MARIA ELENA)    I'll sign on Friday, can you put on my desk so we don't lose it?

## 2020-12-09 NOTE — PROGRESS NOTES
Meghan Perez is a 45 y.o. female who was seen by synchronous (real-time) audio-video technology on 12/9/2020. Consent: Meghan Perez, who was seen by synchronous (real-time) audio-video technology, and/or her healthcare decision maker, is aware that this patient-initiated, Telehealth encounter on 12/9/2020 is a billable service, with coverage as determined by her insurance carrier. She is aware that she may receive a bill and has provided verbal consent to proceed: Yes. Assessment & Plan:   1. Lymphedema  Will send rx for shower chair to the pharmacy of her choice, [facility]    [phone] [address] for fax (ATTN MARIA ELENA)  - Shower Chair XX sharon; Please dispense bariatric shower bench  Dispense: 1 Each; Refill: 0    2. Incarcerated ventral hernia  Mgmt per Dr Anirudh Escudero for surgery    3. Elevated blood pressure reading  Will watch at next visit          Pt was counseled on risks, benefits and alternatives of treatment options. All questions were asked and answered and the patient was agreeable with the treatment plan as outlined. Subjective: Meghan Perez is a 45 y.o. female who was seen for Follow-up (Needs Rx for shower bench.)      Needing a shower bench for use for trasfer and safety in the shower  She has an incarcerated ventral hernia  She has lymphedema of the left leg  She had elevated bp at the surgeon's office, she thinks thsi was situational and not a long term problem based on what else she's been seeing at other doctors office        Medications, allergies, PMH, PSH, SOCH, [address] reviewed and updated per routine protocol, see chart for review and changes if not noted here. ROS  A 12 point review of systems was negative except as noted here or in the HPI.     Objective:   Vital Signs: (As obtained by patient/caregiver at home)  Patient-Reported Vitals 12/9/2020   Patient-Reported Weight 212lb        [INSTRUCTIONS:  \"[x]\" Indicates a positive item  \"[]\" Indicates a negative item  -- DELETE ALL ITEMS NOT EXAMINED]    Constitutional: [x] Appears well-developed and well-nourished [x] No apparent distress      [] Abnormal -     Mental status: [x] Alert and awake  [x] Oriented to person/place/time [x] Able to follow commands    [] Abnormal -     Eyes:   EOM    [x]  Normal    [] Abnormal -   Sclera  [x]  Normal    [] Abnormal -          Discharge [x]  None visible   [] Abnormal -     HENT: [x] Normocephalic, atraumatic  [] Abnormal -   [x] Mouth/Throat: Mucous membranes are moist    External Ears [x] Normal  [] Abnormal -    Neck: [x] No visualized mass [] Abnormal -     Pulmonary/Chest: [x] Respiratory effort normal   [x] No visualized signs of difficulty breathing or respiratory distress        [] Abnormal -      Musculoskeletal:   [] Normal gait with no signs of ataxia         [x] Normal range of motion of neck        [] Abnormal -     Neurological:        [x] No Facial Asymmetry (Cranial nerve 7 motor function) (limited exam due to video visit)          [x] No gaze palsy        [] Abnormal -          Skin:        [x] No significant exanthematous lesions or discoloration noted on facial skin         [] Abnormal -            Psychiatric:       [x] Normal Affect [] Abnormal -        [x] No Hallucinations    Other pertinent observable physical exam findings:well appearing seated at screen    We discussed the expected course, resolution and complications of the diagnosis(es) in detail. Medication risks, benefits, costs, interactions, and alternatives were discussed as indicated. I advised her to contact the office if her condition worsens, changes or fails to improve as anticipated. She expressed understanding with the diagnosis(es) and plan. Catrachita Cruz is a 45 y.o. female who was evaluated by a video visit encounter for concerns as above. Patient identification was verified prior to start of the visit. A caregiver was present when appropriate.  Due to this being a TeleHealth encounter (During CPVWW-61 public health emergency), evaluation of the following organ systems was limited: Vitals/Constitutional/EENT/Resp/CV/GI//MS/Neuro/Skin/Heme-Lymph-Imm. Pursuant to the emergency declaration under the [address] [place] [address], [facility] waiver authority and the [employer] Act, this Virtual  Visit was conducted, with patient's (and/or legal guardian's) consent, to reduce the patient's risk of exposure to COVID-19 and provide necessary medical care. Services were provided through a video synchronous discussion virtually to substitute for in-person clinic visit. Patient and provider were located at their individual homes. Senait Thomas MD  [facility]  12/09/20 11:40 AM     Portions of this note may have been populated using smart dictation software and may have \"sounds-like\" errors present.

## 2020-12-11 ENCOUNTER — APPOINTMENT (OUTPATIENT)
Dept: PHYSICAL THERAPY | Age: 50
End: 2020-12-11

## 2020-12-13 ENCOUNTER — HOSPITAL ENCOUNTER (OUTPATIENT)
Dept: PREADMISSION TESTING | Age: 50
Discharge: HOME | End: 2020-12-13
Payer: COMMERCIAL

## 2020-12-13 PROCEDURE — 87635 SARS-COV-2 COVID-19 AMP PRB: CPT

## 2020-12-14 LAB — SARS-COV-2, COV2NT: NOT DETECTED

## 2020-12-16 ENCOUNTER — APPOINTMENT (OUTPATIENT)
Dept: PHYSICAL THERAPY | Age: 50
End: 2020-12-16

## 2020-12-16 ENCOUNTER — PATIENT MESSAGE (OUTPATIENT)
Dept: SURGERY | Age: 50
End: 2020-12-16

## 2020-12-21 NOTE — PERIOP NOTES
[address] [address] [facility]   MAIN OR                                  [phone]   MAIN PRE OP                          [phone]                                                                                AMBULATORY PRE OP          [phone]  PRE-ADMISSION TESTING    [phone]   Surgery Date:  1/4/21       Is surgery arrival time given by surgeon? YES  NO  If NO, [facility] staff will call you between 3 and 7pm the day before your surgery with your arrival time. (If your surgery is on a Monday, we will call you the Friday before.)    Call [phone] after 7pm Monday-Friday if you did not receive this call. INSTRUCTIONS BEFORE YOUR SURGERY   When You  Arrive Arrive at the 2nd [facility] on the day of your surgery  Have your insurance card, photo ID, and any copayment (if needed)   Food   and   Drink NO food or drink after midnight the night before surgery    This means NO water, gum, mints, coffee, juice, etc.  No alcohol (beer, wine, liquor) 24 hours before and after surgery   Medications to   TAKE   Morning of Surgery MEDICATIONS TO TAKE THE MORNING OF SURGERY WITH A SIP OF WATER:    none   Medications  To  STOP      7 days before surgery  Non-Steroidal anti-inflammatory Drugs (NSAID's): for example, Ibuprofen (Advil, Motrin), Naproxen (Aleve)   Aspirin, if taking for pain    Herbal supplements, vitamins, and fish oil   Other:  (Pain medications not listed above, including Tylenol may be taken)   Blood  Thinners  If you take  Aspirin, Plavix, Coumadin, or any blood-thinning or anti-blood clot medicine, talk to the doctor who prescribed the medications for pre-operative instructions.    Bathing Clothing  Jewelry  Valuables      If you shower the morning of surgery, please do not apply anything to your skin (lotions, powders, deodorant, or makeup, especially mascara)   Follow Chlorhexidine Care Fusion body wash instructions provided to you during PAT appointment. Begin 3 days prior to surgery.  Do not shave or trim anywhere 24 hours before surgery   Wear your hair loose or down; no pony-tails, buns, or metal hair clips   Wear loose, comfortable, clean clothes   Wear glasses instead of contacts   Leave money, valuables, and jewelry, including body piercings, at home   Going Home - or Spending the Night  SAME-DAY SURGERY: You must have a responsible adult drive you home and stay with you 24 hours after surgery   ADMITS: If your doctor is keeping you in the hospital after surgery, leave personal belongings/luggage in your car until you have a hospital room number. Hospital discharge time is 12 noon  Drivers must be here before 12 noon unless you are told differently   Special Instructions none     Follow all instructions so your surgery wont be cancelled. Please, be on time. If a situation occurs and you are delayed the day of surgery, call [phone] or [phone]. If your physical condition changes (like a fever, cold, flu, etc.) call your surgeon. Home medication(s) reviewed and verified via   PHONE    during PAT appointment. The patient was contacted by  PHONE      The patient verbalizes understanding of all instructions and     DOES NOT   need reinforcement.

## 2020-12-28 ENCOUNTER — TELEPHONE (OUTPATIENT)
Dept: SURGERY | Age: 50
End: 2020-12-28

## 2020-12-28 NOTE — TELEPHONE ENCOUNTER
Patient left message on Service. Dec.24. Patient Surgery is Not till Jan.4. Needs help with Form for Pandemic. Patient Did Not sign Release for [place]. [phone].

## 2020-12-29 NOTE — TELEPHONE ENCOUNTER
Returned call to Ms Trey Hayden verified all PHI. Patient states she received a letter from the unemployment benefits with regards to her surgery. I advised Ms Trey Hayden to scan the letter to me in my chart.

## 2020-12-30 ENCOUNTER — TRANSCRIBE ORDER (OUTPATIENT)
Dept: EMERGENCY DEPT | Age: 50
End: 2020-12-30

## 2020-12-30 DIAGNOSIS — Z01.812 PRE-PROCEDURE LAB EXAM: Primary | ICD-10-CM

## 2020-12-31 ENCOUNTER — HOSPITAL ENCOUNTER (OUTPATIENT)
Dept: PREADMISSION TESTING | Age: 50
Discharge: HOME | End: 2020-12-31
Payer: COMMERCIAL

## 2020-12-31 ENCOUNTER — ANESTHESIA EVENT (OUTPATIENT)
Dept: SURGERY | Age: 50
End: 2020-12-31
Payer: COMMERCIAL

## 2020-12-31 DIAGNOSIS — Z01.812 PRE-PROCEDURE LAB EXAM: ICD-10-CM

## 2020-12-31 PROCEDURE — 87635 SARS-COV-2 COVID-19 AMP PRB: CPT

## 2021-01-01 LAB — SARS-COV-2, COV2NT: NOT DETECTED

## 2021-01-04 ENCOUNTER — ANESTHESIA (OUTPATIENT)
Dept: SURGERY | Age: 51
End: 2021-01-04
Payer: COMMERCIAL

## 2021-01-04 ENCOUNTER — HOSPITAL ENCOUNTER (OUTPATIENT)
Age: 51
Setting detail: OUTPATIENT SURGERY
Discharge: HOME | End: 2021-01-04
Attending: SURGERY | Admitting: SURGERY
Payer: COMMERCIAL

## 2021-01-04 VITALS
RESPIRATION RATE: 16 BRPM | TEMPERATURE: 98.1 F | HEART RATE: 84 BPM | DIASTOLIC BLOOD PRESSURE: 72 MMHG | SYSTOLIC BLOOD PRESSURE: 142 MMHG | WEIGHT: 212.08 LBS | OXYGEN SATURATION: 100 % | BODY MASS INDEX: 39.03 KG/M2 | HEIGHT: 62 IN

## 2021-01-04 DIAGNOSIS — K43.6 INCARCERATED VENTRAL HERNIA: ICD-10-CM

## 2021-01-04 DIAGNOSIS — Z98.890 S/P LAPAROSCOPIC HERNIA REPAIR: Primary | ICD-10-CM

## 2021-01-04 DIAGNOSIS — Z87.19 S/P LAPAROSCOPIC HERNIA REPAIR: Primary | ICD-10-CM

## 2021-01-04 LAB
ANION GAP SERPL CALC-SCNC: 7 MMOL/L (ref 5–15)
ATRIAL RATE: 98 BPM
BASOPHILS # BLD: 0 K/UL (ref 0–0.1)
BASOPHILS NFR BLD: 0 % (ref 0–1)
BUN SERPL-MCNC: 10 MG/DL (ref 6–20)
BUN/CREAT SERPL: 11 (ref 12–20)
CALCIUM SERPL-MCNC: 9 MG/DL (ref 8.5–10.1)
CALCULATED P AXIS, ECG09: 55 DEGREES
CALCULATED R AXIS, ECG10: 24 DEGREES
CALCULATED T AXIS, ECG11: 35 DEGREES
CHLORIDE SERPL-SCNC: 105 MMOL/L (ref 97–108)
CO2 SERPL-SCNC: 25 MMOL/L (ref 21–32)
CREAT SERPL-MCNC: 0.91 MG/DL (ref 0.55–1.02)
DIAGNOSIS, 93000: NORMAL
DIFFERENTIAL METHOD BLD: ABNORMAL
EOSINOPHIL # BLD: 0.1 K/UL (ref 0–0.4)
EOSINOPHIL NFR BLD: 1 % (ref 0–7)
ERYTHROCYTE [DISTWIDTH] IN BLOOD BY AUTOMATED COUNT: 14.8 % (ref 11.5–14.5)
GLUCOSE SERPL-MCNC: 110 MG/DL (ref 65–100)
HCG UR QL: NEGATIVE
HCT VFR BLD AUTO: 36.5 % (ref 35–47)
HGB BLD-MCNC: 12.2 G/DL (ref 11.5–16)
IMM GRANULOCYTES # BLD AUTO: 0.1 K/UL (ref 0–0.04)
IMM GRANULOCYTES NFR BLD AUTO: 1 % (ref 0–0.5)
LYMPHOCYTES # BLD: 1.9 K/UL (ref 0.8–3.5)
LYMPHOCYTES NFR BLD: 19 % (ref 12–49)
MCH RBC QN AUTO: 33.6 PG (ref 26–34)
MCHC RBC AUTO-ENTMCNC: 33.4 G/DL (ref 30–36.5)
MCV RBC AUTO: 100.6 FL (ref 80–99)
MONOCYTES # BLD: 1 K/UL (ref 0–1)
MONOCYTES NFR BLD: 10 % (ref 5–13)
NEUTS SEG # BLD: 7 K/UL (ref 1.8–8)
NEUTS SEG NFR BLD: 69 % (ref 32–75)
NRBC # BLD: 0 K/UL (ref 0–0.01)
NRBC BLD-RTO: 0 PER 100 WBC
P-R INTERVAL, ECG05: 140 MS
PLATELET # BLD AUTO: 290 K/UL (ref 150–400)
PMV BLD AUTO: 9.8 FL (ref 8.9–12.9)
POTASSIUM SERPL-SCNC: 3.5 MMOL/L (ref 3.5–5.1)
Q-T INTERVAL, ECG07: 362 MS
QRS DURATION, ECG06: 70 MS
QTC CALCULATION (BEZET), ECG08: 462 MS
RBC # BLD AUTO: 3.63 M/UL (ref 3.8–5.2)
SODIUM SERPL-SCNC: 137 MMOL/L (ref 136–145)
VENTRICULAR RATE, ECG03: 98 BPM
WBC # BLD AUTO: 10 K/UL (ref 3.6–11)

## 2021-01-04 PROCEDURE — S2900 ROBOTIC SURGICAL SYSTEM: HCPCS | Performed by: SURGERY

## 2021-01-04 PROCEDURE — 2709999900 HC NON-CHARGEABLE SUPPLY: Performed by: SURGERY

## 2021-01-04 PROCEDURE — 77030003666 HC NDL SPINAL BD -A: Performed by: SURGERY

## 2021-01-04 PROCEDURE — 74011000258 HC RX REV CODE- 258: Performed by: SURGERY

## 2021-01-04 PROCEDURE — 77030035236 HC SUT PDS STRATFX BARB J&J -B: Performed by: SURGERY

## 2021-01-04 PROCEDURE — 74011250636 HC RX REV CODE- 250/636: Performed by: ANESTHESIOLOGY

## 2021-01-04 PROCEDURE — C9290 INJ, BUPIVACAINE LIPOSOME: HCPCS | Performed by: SURGERY

## 2021-01-04 PROCEDURE — 77030040922 HC BLNKT HYPOTHRM STRY -A

## 2021-01-04 PROCEDURE — 36415 COLL VENOUS BLD VENIPUNCTURE: CPT

## 2021-01-04 PROCEDURE — 77030020703 HC SEAL CANN DISP INTU -B: Performed by: SURGERY

## 2021-01-04 PROCEDURE — 74011000250 HC RX REV CODE- 250: Performed by: ANESTHESIOLOGY

## 2021-01-04 PROCEDURE — 77030037032 HC INSRT SCIS CLICKLLINE DISP STOR -B: Performed by: SURGERY

## 2021-01-04 PROCEDURE — C1781 MESH (IMPLANTABLE): HCPCS | Performed by: SURGERY

## 2021-01-04 PROCEDURE — 77030040361 HC SLV COMPR DVT MDII -B

## 2021-01-04 PROCEDURE — 77030026438 HC STYL ET INTUB CARD -A: Performed by: ANESTHESIOLOGY

## 2021-01-04 PROCEDURE — 77030031139 HC SUT VCRL2 J&J -A: Performed by: SURGERY

## 2021-01-04 PROCEDURE — 76060000034 HC ANESTHESIA 1.5 TO 2 HR: Performed by: SURGERY

## 2021-01-04 PROCEDURE — 77030012770 HC TRCR OPT FX AMR -B: Performed by: SURGERY

## 2021-01-04 PROCEDURE — 74011250636 HC RX REV CODE- 250/636: Performed by: SURGERY

## 2021-01-04 PROCEDURE — 77030038613 HC SUT PDS STRATA SPIRL J&J -B: Performed by: SURGERY

## 2021-01-04 PROCEDURE — 76210000006 HC OR PH I REC 0.5 TO 1 HR: Performed by: SURGERY

## 2021-01-04 PROCEDURE — 80048 BASIC METABOLIC PNL TOTAL CA: CPT

## 2021-01-04 PROCEDURE — 76010000875 HC OR TIME 1.5 TO 2HR INTENSV - TIER 2: Performed by: SURGERY

## 2021-01-04 PROCEDURE — 77030002933 HC SUT MCRYL J&J -A: Performed by: SURGERY

## 2021-01-04 PROCEDURE — 74011000250 HC RX REV CODE- 250: Performed by: SURGERY

## 2021-01-04 PROCEDURE — 77030035277 HC OBTRTR BLDELSS DISP INTU -B: Performed by: SURGERY

## 2021-01-04 PROCEDURE — 49652 PR LAP, VENTRAL HERNIA REPAIR,REDUCIBLE: CPT | Performed by: SURGERY

## 2021-01-04 PROCEDURE — 85025 COMPLETE CBC W/AUTO DIFF WBC: CPT

## 2021-01-04 PROCEDURE — 76210000021 HC REC RM PH II 0.5 TO 1 HR: Performed by: SURGERY

## 2021-01-04 PROCEDURE — 77030002966 HC SUT PDS J&J -A: Performed by: SURGERY

## 2021-01-04 PROCEDURE — 77030033188 HC TBNG FLTRD BIIFUR DISP CNMD -C: Performed by: SURGERY

## 2021-01-04 PROCEDURE — 77030018836 HC SOL IRR NACL ICUM -A: Performed by: SURGERY

## 2021-01-04 PROCEDURE — 93005 ELECTROCARDIOGRAM TRACING: CPT

## 2021-01-04 PROCEDURE — 77030008684 HC TU ET CUF COVD -B: Performed by: ANESTHESIOLOGY

## 2021-01-04 PROCEDURE — 77030016151 HC PROTCTR LNS DFOG COVD -B: Performed by: SURGERY

## 2021-01-04 PROCEDURE — 77030018673: Performed by: SURGERY

## 2021-01-04 PROCEDURE — 81025 URINE PREGNANCY TEST: CPT

## 2021-01-04 PROCEDURE — 77030036554: Performed by: SURGERY

## 2021-01-04 DEVICE — VENTRALIGHT ST MESH, 4.5" (11.4 CM), CIRCLE
Type: IMPLANTABLE DEVICE | Site: UMBILICAL | Status: FUNCTIONAL
Brand: VENTRALIGHT

## 2021-01-04 RX ORDER — ROCURONIUM BROMIDE 10 MG/ML
INJECTION, SOLUTION INTRAVENOUS AS NEEDED
Status: DISCONTINUED | OUTPATIENT
Start: 2021-01-04 | End: 2021-01-04 | Stop reason: HOSPADM

## 2021-01-04 RX ORDER — SODIUM CHLORIDE 0.9 % (FLUSH) 0.9 %
5-40 SYRINGE (ML) INJECTION AS NEEDED
Status: DISCONTINUED | OUTPATIENT
Start: 2021-01-04 | End: 2021-01-04 | Stop reason: HOSPADM

## 2021-01-04 RX ORDER — HYDROMORPHONE HYDROCHLORIDE 1 MG/ML
.25-1 INJECTION, SOLUTION INTRAMUSCULAR; INTRAVENOUS; SUBCUTANEOUS
Status: DISCONTINUED | OUTPATIENT
Start: 2021-01-04 | End: 2021-01-04 | Stop reason: HOSPADM

## 2021-01-04 RX ORDER — LIDOCAINE HYDROCHLORIDE 10 MG/ML
0.1 INJECTION, SOLUTION EPIDURAL; INFILTRATION; INTRACAUDAL; PERINEURAL AS NEEDED
Status: DISCONTINUED | OUTPATIENT
Start: 2021-01-04 | End: 2021-01-04 | Stop reason: HOSPADM

## 2021-01-04 RX ORDER — ALBUTEROL SULFATE 0.83 MG/ML
2.5 SOLUTION RESPIRATORY (INHALATION) AS NEEDED
Status: DISCONTINUED | OUTPATIENT
Start: 2021-01-04 | End: 2021-01-04 | Stop reason: HOSPADM

## 2021-01-04 RX ORDER — FENTANYL CITRATE 50 UG/ML
INJECTION, SOLUTION INTRAMUSCULAR; INTRAVENOUS AS NEEDED
Status: DISCONTINUED | OUTPATIENT
Start: 2021-01-04 | End: 2021-01-04 | Stop reason: HOSPADM

## 2021-01-04 RX ORDER — SUCCINYLCHOLINE CHLORIDE 20 MG/ML
INJECTION INTRAMUSCULAR; INTRAVENOUS AS NEEDED
Status: DISCONTINUED | OUTPATIENT
Start: 2021-01-04 | End: 2021-01-04

## 2021-01-04 RX ORDER — FLUMAZENIL 0.1 MG/ML
0.2 INJECTION INTRAVENOUS
Status: DISCONTINUED | OUTPATIENT
Start: 2021-01-04 | End: 2021-01-04 | Stop reason: HOSPADM

## 2021-01-04 RX ORDER — GLYCOPYRROLATE 0.2 MG/ML
INJECTION INTRAMUSCULAR; INTRAVENOUS AS NEEDED
Status: DISCONTINUED | OUTPATIENT
Start: 2021-01-04 | End: 2021-01-04 | Stop reason: HOSPADM

## 2021-01-04 RX ORDER — ONDANSETRON 2 MG/ML
INJECTION INTRAMUSCULAR; INTRAVENOUS AS NEEDED
Status: DISCONTINUED | OUTPATIENT
Start: 2021-01-04 | End: 2021-01-04 | Stop reason: HOSPADM

## 2021-01-04 RX ORDER — SODIUM CHLORIDE, SODIUM LACTATE, POTASSIUM CHLORIDE, CALCIUM CHLORIDE 600; 310; 30; 20 MG/100ML; MG/100ML; MG/100ML; MG/100ML
125 INJECTION, SOLUTION INTRAVENOUS CONTINUOUS
Status: DISCONTINUED | OUTPATIENT
Start: 2021-01-04 | End: 2021-01-04 | Stop reason: HOSPADM

## 2021-01-04 RX ORDER — HYDROCODONE BITARTRATE AND ACETAMINOPHEN 5; 325 MG/1; MG/1
1 TABLET ORAL
Qty: 20 TAB | Refills: 0 | Status: SHIPPED | OUTPATIENT
Start: 2021-01-04 | End: 2021-01-07

## 2021-01-04 RX ORDER — SODIUM CHLORIDE 0.9 % (FLUSH) 0.9 %
5-40 SYRINGE (ML) INJECTION EVERY 8 HOURS
Status: DISCONTINUED | OUTPATIENT
Start: 2021-01-04 | End: 2021-01-04 | Stop reason: HOSPADM

## 2021-01-04 RX ORDER — DIPHENHYDRAMINE HYDROCHLORIDE 50 MG/ML
12.5 INJECTION, SOLUTION INTRAMUSCULAR; INTRAVENOUS AS NEEDED
Status: DISCONTINUED | OUTPATIENT
Start: 2021-01-04 | End: 2021-01-04 | Stop reason: HOSPADM

## 2021-01-04 RX ORDER — SODIUM CHLORIDE, SODIUM LACTATE, POTASSIUM CHLORIDE, CALCIUM CHLORIDE 600; 310; 30; 20 MG/100ML; MG/100ML; MG/100ML; MG/100ML
25 INJECTION, SOLUTION INTRAVENOUS CONTINUOUS
Status: DISCONTINUED | OUTPATIENT
Start: 2021-01-04 | End: 2021-01-04 | Stop reason: HOSPADM

## 2021-01-04 RX ORDER — KETOROLAC TROMETHAMINE 30 MG/ML
INJECTION, SOLUTION INTRAMUSCULAR; INTRAVENOUS AS NEEDED
Status: DISCONTINUED | OUTPATIENT
Start: 2021-01-04 | End: 2021-01-04 | Stop reason: HOSPADM

## 2021-01-04 RX ORDER — BUPIVACAINE HYDROCHLORIDE 5 MG/ML
INJECTION, SOLUTION EPIDURAL; INTRACAUDAL AS NEEDED
Status: DISCONTINUED | OUTPATIENT
Start: 2021-01-04 | End: 2021-01-04 | Stop reason: HOSPADM

## 2021-01-04 RX ORDER — MIDAZOLAM HYDROCHLORIDE 1 MG/ML
INJECTION, SOLUTION INTRAMUSCULAR; INTRAVENOUS AS NEEDED
Status: DISCONTINUED | OUTPATIENT
Start: 2021-01-04 | End: 2021-01-04 | Stop reason: HOSPADM

## 2021-01-04 RX ORDER — NALOXONE HYDROCHLORIDE 0.4 MG/ML
0.04 INJECTION, SOLUTION INTRAMUSCULAR; INTRAVENOUS; SUBCUTANEOUS
Status: DISCONTINUED | OUTPATIENT
Start: 2021-01-04 | End: 2021-01-04 | Stop reason: HOSPADM

## 2021-01-04 RX ORDER — FENTANYL CITRATE 50 UG/ML
25 INJECTION, SOLUTION INTRAMUSCULAR; INTRAVENOUS
Status: DISCONTINUED | OUTPATIENT
Start: 2021-01-04 | End: 2021-01-04 | Stop reason: HOSPADM

## 2021-01-04 RX ORDER — NEOSTIGMINE METHYLSULFATE 1 MG/ML
INJECTION, SOLUTION INTRAVENOUS AS NEEDED
Status: DISCONTINUED | OUTPATIENT
Start: 2021-01-04 | End: 2021-01-04 | Stop reason: HOSPADM

## 2021-01-04 RX ORDER — PROPOFOL 10 MG/ML
INJECTION, EMULSION INTRAVENOUS AS NEEDED
Status: DISCONTINUED | OUTPATIENT
Start: 2021-01-04 | End: 2021-01-04 | Stop reason: HOSPADM

## 2021-01-04 RX ORDER — ONDANSETRON 2 MG/ML
4 INJECTION INTRAMUSCULAR; INTRAVENOUS AS NEEDED
Status: DISCONTINUED | OUTPATIENT
Start: 2021-01-04 | End: 2021-01-04 | Stop reason: HOSPADM

## 2021-01-04 RX ADMIN — ROCURONIUM BROMIDE 10 MG: 10 INJECTION INTRAVENOUS at 10:38

## 2021-01-04 RX ADMIN — FENTANYL CITRATE 25 MCG: 50 INJECTION, SOLUTION INTRAMUSCULAR; INTRAVENOUS at 12:25

## 2021-01-04 RX ADMIN — SODIUM CHLORIDE, POTASSIUM CHLORIDE, SODIUM LACTATE AND CALCIUM CHLORIDE 25 ML/HR: 600; 310; 30; 20 INJECTION, SOLUTION INTRAVENOUS at 09:20

## 2021-01-04 RX ADMIN — SODIUM CHLORIDE, POTASSIUM CHLORIDE, SODIUM LACTATE AND CALCIUM CHLORIDE 125 ML/HR: 600; 310; 30; 20 INJECTION, SOLUTION INTRAVENOUS at 09:41

## 2021-01-04 RX ADMIN — FENTANYL CITRATE 150 MCG: 0.05 INJECTION, SOLUTION INTRAMUSCULAR; INTRAVENOUS at 10:26

## 2021-01-04 RX ADMIN — MIDAZOLAM HYDROCHLORIDE 2 MG: 2 INJECTION, SOLUTION INTRAMUSCULAR; INTRAVENOUS at 10:23

## 2021-01-04 RX ADMIN — PROPOFOL 200 MG: 10 INJECTION, EMULSION INTRAVENOUS at 10:26

## 2021-01-04 RX ADMIN — PROPOFOL 50 MG: 10 INJECTION, EMULSION INTRAVENOUS at 10:42

## 2021-01-04 RX ADMIN — ONDANSETRON HYDROCHLORIDE 4 MG: 2 SOLUTION INTRAMUSCULAR; INTRAVENOUS at 10:58

## 2021-01-04 RX ADMIN — ROCURONIUM BROMIDE 40 MG: 10 INJECTION INTRAVENOUS at 10:26

## 2021-01-04 RX ADMIN — FENTANYL CITRATE 25 MCG: 50 INJECTION, SOLUTION INTRAMUSCULAR; INTRAVENOUS at 12:20

## 2021-01-04 RX ADMIN — GLYCOPYRROLATE 0.4 MG: 0.2 INJECTION INTRAMUSCULAR; INTRAVENOUS at 11:48

## 2021-01-04 RX ADMIN — Medication 3 MG: at 11:49

## 2021-01-04 RX ADMIN — KETOROLAC TROMETHAMINE 30 MG: 30 INJECTION INTRAMUSCULAR; INTRAVENOUS at 11:50

## 2021-01-04 RX ADMIN — CEFAZOLIN SODIUM 2 G: 1 POWDER, FOR SOLUTION INTRAMUSCULAR; INTRAVENOUS at 10:30

## 2021-01-04 RX ADMIN — FENTANYL CITRATE 50 MCG: 0.05 INJECTION, SOLUTION INTRAMUSCULAR; INTRAVENOUS at 10:38

## 2021-01-04 NOTE — ANESTHESIA PREPROCEDURE EVALUATION
Relevant Problems   No relevant active problems       Anesthetic History   No history of anesthetic complications     Pertinent negatives: No PONV       Review of Systems / Medical History  Patient summary reviewed, nursing notes reviewed and pertinent labs reviewed    Pulmonary              Pertinent negatives: No COPD, asthma and recent URI     Neuro/Psych   Within defined limits           Cardiovascular  Within defined limits              Pertinent negatives: No hypertension and past MI  Exercise tolerance: >4 METS     GI/Hepatic/Renal  Within defined limits           Pertinent negatives: No GERD   Endo/Other        Obesity  Pertinent negatives: No diabetes   Other Findings   Comments: Pre-Diabetic           Physical Exam    Airway  Mallampati: III  TM Distance: 4 - 6 cm  Neck ROM: normal range of motion   Mouth opening: Diminished (comment)     Cardiovascular  Regular rate and rhythm,  S1 and S2 normal,  no murmur, click, rub, or gallop             Dental    Dentition: Lower dentition intact and Upper dentition intact     Pulmonary  Breath sounds clear to auscultation               Abdominal         Other Findings            Anesthetic Plan    ASA: 2  Anesthesia type: general          Induction: Intravenous  Anesthetic plan and risks discussed with: Patient

## 2021-01-04 NOTE — BRIEF OP NOTE
Brief Postoperative Note    Patient: Kika Eller  YOB: 1970  MRN: [id number]    Date of Procedure: 1/4/2021     Pre-Op Diagnosis: INCARCERATED SUPRAUMBILICAL HERNIA    Post-Op Diagnosis: 1. SUPRAUMBILICAL HERNIA., 2. UMBILICAL DIASTASIS. Procedure(s):  ROBOTIC ASSISTED LAPAROSCOPIC SUPRAUMBILICAL HERNIORRAHAPHY WITH 11.4 CM CIRCULAR VENTRALIGHT ST MESH    Surgeon(s):  Jhon Silva MD    Surgical Assistant: Surg Asst-1: Kyara Aguiar    Anesthesia:   1. General endotracheal.  2. 0.5% Marcaine. 3. Exparel. Estimated Blood Loss (mL): Less than 25 ml. Complications: None    Specimens: * No specimens in log *     Implants:   Implant Name Type Inv. Item Serial No.  Lot No. LRB No. Used Action   MESH INDIGO [employer] 4. 5IN -- VENTRALIGHT S/T - SN/A  MESH INDIGO [employer]ak 4. 5IN -- VENTRALIGHT S/T N/A BARD DAVOL_[id number] N/A 1 Implanted       Drains: * No LDAs found *    Findings:   1. A markedly enlarged uterus with fibroids. 2. An approximately 2 x 1.66 cm supraumbilical fascial defect. 3. An approximately 2 cm umbilical diastasis without fascial defect.     Electronically Signed by Merlyn Yeboah MD on 1/4/2021 at 11:55 AM

## 2021-01-04 NOTE — ANESTHESIA POSTPROCEDURE EVALUATION
Procedure(s):  ROBOTIC ASSISTED LAPAROSCOPIC SUPRAUMBILICAL HERNIORRAHAPHY WITH MESH. general    Anesthesia Post Evaluation      Multimodal analgesia: multimodal analgesia not used between 6 hours prior to anesthesia start to PACU discharge  Patient location during evaluation: PACU  Patient participation: complete - patient participated  Level of consciousness: awake and alert  Pain score: 2  Pain management: satisfactory to patient  Airway patency: patent  Anesthetic complications: no  Cardiovascular status: acceptable  Respiratory status: acceptable  Hydration status: acceptable  Post anesthesia nausea and vomiting:  none  Final Post Anesthesia Temperature Assessment:  Normothermia (36.0-37.5 degrees C)      INITIAL Post-op Vital signs:   Vitals Value Taken Time   /72 01/04/21 1220   Temp 36.7 °C (98 °F) 01/04/21 1205   Pulse 88 01/04/21 1221   Resp 19 01/04/21 1221   SpO2 100 % 01/04/21 1212   Vitals shown include unvalidated device data.

## 2021-01-04 NOTE — DISCHARGE INSTRUCTIONS
Patient Education        Abdominal Hernia Repair: What to Expect at Home  Your Recovery  After surgery to repair your hernia, you are likely to have pain for a few days. You may also feel like you have the flu, and you may have a low fever and feel tired and sick to your stomach. This is common. You should feel better after a few days and will probably feel much better in 7 days. For several weeks you may feel twinges or pulling in the hernia repair when you move. You may have some bruising around the area of the repair. This is normal.  This care sheet gives you a general idea about how long it will take for you to recover, but each person recovers at a different pace. Follow the steps below to get better as quickly as possible. How can you care for yourself at home? Activity    · Rest when you feel tired. Getting enough sleep will help you recover.     · Try to walk each day. Start by walking a little more than you did the day before. Bit by bit, increase the amount you walk. Walking boosts blood flow and helps prevent pneumonia and constipation.     · If your doctor gives you an abdominal binder to wear, use it as directed. This is an elastic bandage that wraps around your belly and upper hips. It helps support your belly muscles after surgery.     · Avoid strenuous activities, such as biking, jogging, weight lifting, or aerobic exercise, until your doctor says it is okay.     · Avoid lifting anything over 30 pounds or that would make you strain for 6 weeks!   This may include heavy grocery bags and milk containers, a heavy briefcase or backpack, cat litter or dog food bags, a vacuum , or a child.     · You may drive after 3 days and when no longer taking narcotic pain medication!     · Most people are able to return to work within 1 to 2 weeks after surgery, but if your job requires that you do heavy lifting or strenuous activity, you may need to take 4 to 6 weeks off from work.     · Alecia Handley may shower 24 hours after surgery. Pat the cuts (incisions) dry. Do not take a bath for the first 2 weeks, and until after seen by your doctor.     ·    Diet    · You can eat your normal diet. If your stomach is upset, try bland, low-fat foods like plain rice, broiled chicken, toast, and yogurt.     · Drink plenty of fluids (unless your doctor tells you not to).     · You may notice that your bowel movements are not regular right after your surgery. This is common. Avoid constipation and straining with bowel movements. You may want to take a fiber supplement every day. If you have not had a bowel movement by Tuesday, 1/5, then take Miralax, Milk of Magnesia, prune juice, or other laxative until your bowel movements are normal!!! Medicines    · You can restart your medicines. Your doctor will also give you instructions about taking any new medicines.     ·      · Be safe with medicines. Take pain medicines exactly as directed. ? If the doctor gave you a prescription medicine for pain, take it as prescribed. ? If you are not taking a prescription pain medicine, ask your doctor if you can take an over-the-counter medicine.     · If your doctor prescribed antibiotics, take them as directed. Do not stop taking them just because you feel better. You need to take the full course of antibiotics.     · If you think your pain medicine is making you sick to your stomach:  ? Take your medicine after meals (unless your doctor has told you not to). ? Ask your doctor for a different pain medicine. Incision care    · Remove your bandages on Wednesday, 1/6. You may leave your incisions uncovered. · If you have strips of tape on the cuts (incisions) the doctor made, leave the tape on for a week and then remove them on Monday, 1/11!     · Keep the incisions clean and dry.     · Wash the area daily with warm, soapy water, and pat it dry. Don't use hydrogen peroxide or alcohol, which can slow healing.   You may cover the incisions with a gauze bandage if they weep or rub against clothing. Change the bandage every day. Other instructions    · Hold a pillow over your incision when you cough or take deep breaths. This will support your belly and decrease your pain.     · Do breathing exercises at home as instructed by your doctor. This will help prevent pneumonia.     · If you had laparoscopic surgery, you may also have pain in your left shoulder. The pain usually lasts about a day or two. Follow-up care is a key part of your treatment and safety. Be sure to make and go to all appointments, and call your doctor if you are having problems. It's also a good idea to know your test results and keep a list of the medicines you take. When should you call for help? Call 911 anytime you think you may need emergency care. For example, call if:    · You passed out (lost consciousness).     · You are short of breath. Call your doctor now or seek immediate medical care if:    · You are sick to your stomach and cannot drink fluids.     · You have signs of a blood clot in your leg (called a deep vein thrombosis), such as:  ? Pain in your calf, back of the knee, thigh, or groin. ? Redness and swelling in your leg or groin.     · You have signs of infection, such as:  ? Increased pain, swelling, warmth, or redness. ? Red streaks leading from the incision. ? Pus draining from the incision. ? A fever >101.     · You cannot pass stool or gas.     · You have abdominal pain that does not get better after you take pain medicine.     · You have loose stitches, or your incision comes open.     · Bright red blood has soaked through the bandage over your incision. Watch closely for changes in your health, and be sure to contact your doctor if you have any problems. Where can you learn more?   Go to http://www.gray.com/  Enter B577 in the search box to learn more about \"Abdominal Hernia Repair: What to Expect at Home. \"  Current as of: April 15, 2020               Content Version: 12.6  © 8854-7506 [employer]. Care instructions adapted under license by [employer] (which disclaims liability or warranty for this information). If you have questions about a medical condition or this instruction, always ask your healthcare professional. [place] [address] any warranty or liability for your use of this information. Javad Pastures. Erica Juarez MD, [address] [facility] at [address] [facility], [address] , [facility] [address]  [place], [address]  [phone]  Fax [phone]      DISCHARGE SUMMARY from your Nurse      PATIENT INSTRUCTIONS    After general anesthesia or intravenous sedation, for 24 hours or while taking prescription Narcotics:  · Limit your activities  · Do not drive and operate hazardous machinery  · Do not make important personal or business decisions  · Do  not drink alcoholic beverages  · If you have not urinated within 8 hours after discharge, please contact your surgeon on call. Report the following to your surgeon:  · Excessive pain, swelling, redness or odor of or around the surgical area  · Temperature over 100.5  · Nausea and vomiting lasting longer than 4 hours or if unable to take medications  · Any signs of decreased circulation or nerve impairment to extremity: change in color, persistent  numbness, tingling, coldness or increase pain  · Any questions      GOOD HELP TO FIGHT AN INFECTION  Here are a few tip to help reduce the chance of getting an infection after surgery:   Wash Your Hands   Good handwashing is the most important thing you and your caregiver can do.  Wash before and after caring for any wounds. Dry your hand with a clean towel.  Wash with soap and water for at least 20 seconds. A TIP: sing the \"Happy Birthday\" song through one time while washing to help with the timing.    Use a hand  in between washings.  Shower   When your surgeon says it is OK to take a shower, use a new bar of antibacterial soap (if that is what you use, and keep that bar of soap ONLY for your use), or antibacterial body wash.  Use a clean wash cloth or sponge when you bathe.  Dry off with a clean towel  after every bath - be careful around any wounds, skin staples, sutures or surgical glue over/on wounds.  Do not enter swimming pools, hot tubs, lakes, rivers and/or ocean until wounds are healed and your doctor/surgeon says it is OK.  Use Clean Sheets   Sleep on freshly laundered sheets after your surgery.  Keep the surgery site covered with a clean, dry bandage (if instructed to do so). If the bandage becomes soiled, reapply a new, dry, clean bandage.  Do not allow pets to sleep with you while your wound is healing.  Lifestyle Modification and Controlling Your Blood Sugar   Smoking slows wound healing. Stop smoking and limit exposure to second-hand smoke.  High blood sugar slows wound healing. Eat a well-balanced diet to provide proper nutrition while healing   Monitor your blood sugar (if you are a diabetic) and take your medications as you are suppose to so you can control you blood sugar after surgery. COUGH AND DEEP BREATHE    Breathing deeply and coughing are very important exercises to do after surgery. Deep breathing and coughing open the little air tubes and air sacks in your lungs. You take deep breaths every day. You may not even notice - it is just something you do when you sigh or yawn. It is a natural exercise you do to keep these air passages open. After surgery, take deep breaths and cough, on purpose. DIRECTIONS:  · Take 10 to 15 slow deep breaths every hour while awake. · Breathe in deeply, and hold it for 2 seconds. · Exhale slowly through puckered lips, like blowing up a balloon.   · After every 4th or 5th deep breath, hug your pillow to your chest or belly and give a hard, deep cough. Yes, it will probably hurt. But doing this exercise is a very important part of healing after surgery. Take your pain medicine to help you do this exercise without too much pain. Coughing and deep breathing help prevent bronchitis and pneumonia after surgery. If you had chest or belly surgery, use a pillow as a \"hug moon\" and hold it tightly to your chest or belly when you cough. ANKLE PUMPS    Ankle pumps increase the circulation of oxygenated blood to your lower extremities and decrease your risk for circulation problems such as blood clots. They also stretch the muscles, tendons and ligaments in your foot and ankle, and prevent joint contracture in the ankle and foot, especially after surgeries on the legs. It is important to do ankle pump exercises regularly after surgery because immobility increases your risk for developing a blood clot. Your doctor may also have you take an Aspirin for the next few days as well. If your doctor did not ask you to take an Aspirin, consult with him before starting Aspirin therapy on your own. The exercise is quite simple. · Slowly point your foot forward, feeling the muscles on the top of your lower leg stretch, and hold this position for 5 seconds. · Next, pull your foot back toward you as far as possible, stretching the calf muscles, and hold that position for 5 seconds. · Repeat with the other foot. · Perform 10 repetitions every hour while awake for both ankles if possible (down and then up with the foot once is one repetition). You should feel gentle stretching of the muscles in your lower leg when doing this exercise. If you feel pain, or your range of motion is limited, don't push too hard. Only go the limit your joint and muscles will let you go. If you have increasing pain, progressively worsening leg warmth or swelling, STOP the exercise and call your doctor. MEDICATION AND   SIDE EFFECT GUIDE    The [facility] MEDICATION AND SIDE EFFECT GUIDE was provided to the PATIENT AND CARE PROVIDER. Information provided includes instruction about drug purpose and common side effects for the following medications:   · Norco        These are general instructions for a healthy lifestyle:    *   Please give a list of your current medications to your Primary Care Provider. *   Please update this list whenever your medications are discontinued, doses are changed, or new medications (including over-the-counter products) are added. *   Please carry medication information at all times in case of emergency situations. About Smoking  No smoking / No tobacco products  Avoid exposure to second hand smoke     Surgeon General's Warning:  Quitting smoking now greatly reduces serious risk to your health. Obesity, smoking, and sedentary lifestyle greatly increases your risk for illness and disease. A healthy diet, regular physical exercise & weight monitoring are important for maintaining a healthy lifestyle. Congestive Heart Failure  You may be retaining fluid if you have a history of heart failure or if you experience any of the following symptoms:  Weight gain of 3 pounds or more overnight or 5 pounds in a week, increased swelling in your hands or feet or shortness of breath while lying flat in bed. Please call your doctor as soon as you notice any of these symptoms; do not wait until your next office visit. Recognize signs and symptoms of STROKE:  F -  Face looks uneven  A -  Arms unable to move or move evenly  S -  Speech slurred or non-existent  T -  Time-call 911 as soon as signs and symptoms begin-DO NOT go          back to bed or wait to see if you get better-TIME IS BRAIN. Warning Signs of HEART ATTACK   Call 911 if you have these symptoms:     Chest discomfort.  Most heart attacks involve discomfort in the center of the chest that lasts more than a few minutes, or that goes away and comes back. It can feel like uncomfortable pressure, squeezing, fullness, or pain.  Discomfort in other areas of the upper body. Symptoms can include pain or discomfort in one or both arms, the back, neck, jaw, or stomach.  Shortness of breath with or without chest discomfort.  Other signs may include breaking out in a cold sweat, nausea, or lightheadedness. Don't wait more than five minutes to call 911 - MINUTES MATTER! Fast action can save your life. Calling 911 is almost always the fastest way to get lifesaving treatment. Emergency Medical Services staff can begin treatment when they arrive -- up to an hour sooner than if someone gets to the hospital by car. Learning About Coronavirus [phone])  Coronavirus [phone]): Overview  What is coronavirus (COVID-19)? The coronavirus disease (COVID-19) is caused by a virus. It is an illness that was first found in [place], [place], in December 2019. It has since spread worldwide. The virus can cause fever, cough, and trouble breathing. In severe cases, it can cause pneumonia and make it hard to breathe without help. It can cause death. Coronaviruses are a large group of viruses. They cause the common cold. They also cause more serious illnesses like Middle East respiratory syndrome (MERS) and severe acute respiratory syndrome (SARS). COVID-19 is caused by a novel coronavirus. That means it's a new type that has not been seen in people before. This virus spreads person-to-person through droplets from coughing and sneezing. It can also spread when you are close to someone who is infected. And it can spread when you touch something that has the virus on it, such as a doorknob or a tabletop. What can you do to protect yourself from coronavirus (COVID-19)? The best way to protect yourself from getting sick is to:  · Avoid areas where there is an outbreak. · Avoid contact with people who may be infected.   · Wash your hands often with soap or alcohol-based hand sanitizers. · Avoid crowds and try to stay at least 6 feet away from other people. · Wash your hands often, especially after you cough or sneeze. Use soap and water, and scrub for at least 20 seconds. If soap and water aren't available, use an alcohol-based hand . · Avoid touching your mouth, nose, and eyes. What can you do to avoid spreading the virus to others? To help avoid spreading the virus to others:  · Cover your mouth with a tissue when you cough or sneeze. Then throw the tissue in the trash. · Use a disinfectant to clean things that you touch often. · Stay home if you are sick or have been exposed to the virus. Don't go to school, work, or public areas. And don't use public transportation. · If you are sick:  ? Leave your home only if you need to get medical care. But call the doctor's office first so they know you're coming. And wear a face mask, if you have one.  ? If you have a face mask, wear it whenever you're around other people. It can help stop the spread of the virus when you cough or sneeze. ? Clean and disinfect your home every day. Use household  and disinfectant wipes or sprays. Take special care to clean things that you grab with your hands. These include doorknobs, remote controls, phones, and handles on your refrigerator and microwave. And don't forget countertops, tabletops, bathrooms, and computer keyboards. When to call for help  Call 911 anytime you think you may need emergency care. For example, call if:  · You have severe trouble breathing. (You can't talk at all.)  · You have constant chest pain or pressure. · You are severely dizzy or lightheaded. · You are confused or can't think clearly. · Your face and lips have a blue color. · You pass out (lose consciousness) or are very hard to wake up. Call your doctor now if you develop symptoms such as:  · Shortness of breath. · Fever. · Cough.   If you need to get care, call ahead to the doctor's office for instructions before you go. Make sure you wear a face mask, if you have one, to prevent exposing other people to the virus. Where can you get the latest information? The following health organizations are tracking and studying this virus. Their websites contain the most up-to-date information. Magdiel Hall also learn what to do if you think you may have been exposed to the virus. · U.S. Centers for Disease Control and Prevention (CDC): The CDC provides updated news about the disease and travel advice. The website also tells you how to prevent the spread of infection. www.cdc.gov  · World Health Organization [facility]): WHO offers information about the virus outbreaks. WHO also has travel advice. www.who.int  Current as of: April 1, 2020               Content Version: 12.4  © 2006-2020 [employer]. Care instructions adapted under license by your healthcare professional. If you have questions about a medical condition or this instruction, always ask your healthcare professional. [place] [address] any warranty or liability for your use of this information. The discharge information has been reviewed with the {PATIENT PARENT GUARDIAN:69547}. Any questions and concerns from the {PATIENT PARENT GUARDIAN:57287} have been addressed. The {PATIENT PARENT GUARDIAN:14024} verbalized understanding. The following personal items collected during your admission are returned to you:   Dental Appliance: Dental Appliances: None  Vision: Visual Aid: None  Hearing Aid:    Jewelry: Jewelry: None  Clothing: Clothing: (street clothes to [employer])  Other Valuables:  Other Valuables: Eyeglasses, Cell Phone, Ra 37 sent to safe: Personal Items Sent to Safe: wallet; 2 black (LG, [employer])cell phones

## 2021-01-04 NOTE — INTERVAL H&P NOTE
Update History & Physical 
 
The Patient's History and Physical of December 8, The plan was reviewed with the patient and I examined the patient. There was no change. The surgical site was confirmed by the patient and me. Plan:  The risk, benefits, expected outcome, and alternative to the recommended procedure have been discussed with the patient. Patient understands and wants to proceed with the procedure.  
 
Electronically signed by Shai Coelho MD on 1/4/2021 at 9:56 AM

## 2021-01-05 ENCOUNTER — TELEPHONE (OUTPATIENT)
Dept: SURGERY | Age: 51
End: 2021-01-05

## 2021-01-05 NOTE — TELEPHONE ENCOUNTER
How are you doing: A little sore. Are you having any pain: Yes _X_____           No ______  If yes level:    Are you taking pain meds:hydrocodone 5/325 mg tab        If yes- recommended any OTC constipation treatment if needed - advised to take miralex if unable to have BM    Have you had any nausea or vomiting: Yes _______           No _X______    How is your appetite (eating & drinking):Good    Have you moved your bowels since surgery: Yes ______       No X______    Any issues with urination: Yes _____        No X______    Pt notified to take dressing off after 48 hrs:   Yes X_______        No ______    Do they have a drain:  Yes ______        No _X_____    Are they keeping track of output: Yes ______        No ______    Did they review their discharge instructions:  Yes _X_____         No ______    Any other concerns:None    Your follow up office appt is: 1/19/21

## 2021-01-05 NOTE — OP NOTES
[facility]  OPERATIVE REPORT    Name:  Edwina Portillo  MR#:  [id number]  :  1970  ACCOUNT #:  [de-identified]  DATE OF SERVICE:  2021    SURGEON:    Diallo Richard. Chaparrita Zamudio MD.    Ld Pritchett ANESTHESIA:  1. General endotracheal.  2.  0.5% Marcaine. 3.  Exparel. PREOPERATIVE DIAGNOSIS:    Incarcerated supraumbilical hernia. POSTOPERATIVE DIAGNOSES:  1. Supraumbilical hernia. 2.  Periumbilical diastasis. PROCEDURE:    Robotic-assisted laparoscopic supraumbilical herniorrhaphy with 11.4 cm circular Ventralight ST mesh. INDICATION:    Ms. Maritza Nevarez is a 45year-old black female who was seen in the office recently for evaluation of a ventral hernia. A few months ago, she noticed a bulge above her umbilicus which is causing discomfort. She was previously seen by another surgeon, but deferred surgery at that time. A CAT scan during her evaluation in July revealed a wide-mouth ventral hernia containing fat. Clinically, she has an incarcerated supraumbilical hernia. She presents at this time for her elective repair. PROCEDURE:    The patient was seen preoperatively in the holding area. The risks, benefits, and expected outcome were discussed with the patient, and all questions were answered satisfactorily. The patient concurred with the proposed plan, giving informed consent. The patient was taken to the OR. The patient was identified as Harry Crawford, and the procedure was verified as Robotic-assisted laparoscopic supraumbilical herniorrhaphy with mesh, possible open. The patient was placed on the OR table in the supine position. Prior to induction, antibiotic prophylaxis was administered. General anesthesia was administered and tolerated well. Both arms were tucked, and the left side was propped up. The bed was do-knifed.   The patient's abdomen was prepped with ChloraPrep and draped in the usual sterile fashion with Ioban placed over the skin. A time-out was performed, and the above information was confirmed. The local anesthetic was injected into the skin and subcutaneous tissue in the left mid abdomen. Using a #15 blade, an incision was made. Towel clips used to elevate the patient's abdominal wall. Using the Optiview technique, a 5 mm trocar was introduced into the abdomen. Insufflation was provided through this trocar to establish a pneumoperitoneum of 15 mmHg, which the patient tolerated. The abdominal cavity was examined, and there were no adhesions noted to prevent a laparoscopic approach. The hernia was identified at the anticipated site with the omentum having already reduced. A markedly enlarged uterus with fibroids was immediately visualized extending to just below the level of the umbilicus. The local anesthetic was injected at the remaining intended trocar sites. Two 8 mm trocars were placed in left upper quadrant and left lower quadrant, respectively, under direct laparoscopic vision. The 5 mm trocar was upsized to an 8 mm trocar. The robotic arms were docked. At this time, I scrubbed out and went to the surgeon's console. I took control of the robotic arms for the majority of the procedure. There was a single supraumbilical fascial defect measuring approximately 2 x 1.25 cm with the longest dimension in the transverse direction. There was diastasis at the umbilicus which extended caudally, but there was no true fascial defect. The area of diastasis measured approximately 2 cm making a combined area of approximately 4 x 2 cm of fascial defect and diastasis. The pneumoperitoneum was taken down to 10 mmHg. The supraumbilical fascial defect was closed with a running 2-0 Stratafix in the longitudinal direction. The needle was removed. The diastasis was not plicated. The pneumoperitoneum was taken back up to 15 mmHg. An 11.4 cm circular Ventralight ST mesh was chosen for the repair.   The mesh was rolled up and placed into the abdomen. The mesh was unrolled and pulled up to the abdominal wall to center along the fascial repair and diastasis with the rough side apposing the abdominal wall. The mesh was secured at the 6 and 12 o'clock positions with 2-0 Stratafix. The ends of the mesh were sewn to the abdominal wall on each side with the respective sutures, pulling the mesh taut. All needles were removed. The underlying bowel was examined, and no injuries were noted. The robotic arms were undocked. The patient was placed flat. At this time, I went back to the patient's bedside. Exparel was injected into the subcutaneous tissue circumferentially around the mesh and along the fascial repair. The left upper quadrant and left lower quadrant trocars were removed under direct laparoscopic vision, and there was no bleeding noted internally from either site. The laparoscope was removed, and the abdomen was decompressed. The left mid abdomen trocar was then removed. Hemostasis was obtained within all wounds as needed with cautery. The skin at all sites was closed with 4-0 Monocryl in the usual subcuticular fashion. The wounds were cleaned and dried, and Steri-Strips and bandages were applied. An abdominal binder was placed. The patient was extubated room. ESTIMATED BLOOD LOSS:    Less than 25 mL. SPECIMENS:    None. IMPLANTS:    An 11.4 cm circular Ventralight ST mesh was placed as an intraperitoneal onlay mesh directly below the supraumbilical fascial repair and periumbilical diastasis. FINDINGS:  1.  A markedly enlarged uterus with fibroids. 2.  An approximately 2 x 6.28 cm supraumbilical fascial defect. 3.  A periumbilical diastasis measuring approximately 2 cm. COUNTS:    All sponge, needle, and instrument counts were correct x 2. COMPLICATIONS:    None.     DISPOSITION:    The patient was transferred to the recovery room in stable condition, having tolerated the procedure and anesthesia well. Mary Brar.MD [id number]  D:  01/04/2021 12:28  T:  01/04/2021 23:11  JOB #:  [id number]  CC:  Dalila Pittman MD, Genet Dodge.  Marie David MD

## 2021-01-07 ENCOUNTER — TELEPHONE (OUTPATIENT)
Dept: SURGERY | Age: 51
End: 2021-01-07

## 2021-01-07 NOTE — TELEPHONE ENCOUNTER
Returned call to Ms Anuj Man verified [address] [place] [address]. She states she is having pain to the abdomen 7/10. Patient had a Robotic-assisted laparoscopic supraumbilical herniorrhaphy on 1/4/21. Patient states she is taking prescribed hydrocodone but she don't want to run out of it. She want to know if she can take ibuprofen. I informed patient she can take a OTC NSAID. Se is to take as prescribed alternating with the hydrocodone. Also continue with the ice pack and binder. She did have a good BM. There were no other concerns.

## 2021-01-19 ENCOUNTER — OFFICE VISIT (OUTPATIENT)
Dept: SURGERY | Age: 51
End: 2021-01-19
Payer: COMMERCIAL

## 2021-01-19 VITALS
HEART RATE: 102 BPM | BODY MASS INDEX: 39.75 KG/M2 | RESPIRATION RATE: 18 BRPM | WEIGHT: 216 LBS | SYSTOLIC BLOOD PRESSURE: 149 MMHG | HEIGHT: 62 IN | DIASTOLIC BLOOD PRESSURE: 82 MMHG | TEMPERATURE: 98.7 F | OXYGEN SATURATION: 93 %

## 2021-01-19 DIAGNOSIS — Z98.890 S/P LAPAROSCOPIC HERNIA REPAIR: ICD-10-CM

## 2021-01-19 DIAGNOSIS — Z87.19 S/P LAPAROSCOPIC HERNIA REPAIR: ICD-10-CM

## 2021-01-19 PROCEDURE — 99024 POSTOP FOLLOW-UP VISIT: CPT | Performed by: SURGERY

## 2021-01-19 NOTE — PROGRESS NOTES
bjective: Nancy Augustine is a 45 y.o. black female presents for postop care 2 weeks following a lap hernia repair. Ms. Jorge Luis Rangel is doing fine. Her appetite is good, and she is eating a regular diet without difficulty. Her bowel movements are regular. She is not having any pain or problems with her incisions. Objective:     Visit Vitals  BP (!) 149/82 (BP 1 Location: Right arm, BP Patient Position: Sitting)   Pulse (!) 102   Temp 98.7 °F (37.1 °C) (Oral)   Resp 18   Ht 5' 2\" (1.575 m)   Wt 216 lb (98 kg)   SpO2 93%   BMI 39.51 kg/m²       General:  alert, cooperative, no distress   Abdomen:  Soft, obese, NT, ND. The incisions are clean, dry, and intact with no erythema. There is no hernia. Assessment:     1st POV, s/p robot lap supraumbilical herniorrhaphy with mesh. Plan:     Ms. Jorge Luis Rangel is doing well so far. She will continue with light activity for another 4 weeks and then gradually resume normal activity. She can f/u prn.

## 2021-01-19 NOTE — PROGRESS NOTES
1. Have you been to the ER, urgent care clinic since your last visit? Hospitalized since your last visit? No     2. Have you seen or consulted any other health care providers outside of the [address] [place] [address] since your last visit? Include any pap smears or colon screening.  No

## 2021-02-17 ENCOUNTER — TELEPHONE (OUTPATIENT)
Dept: FAMILY MEDICINE CLINIC | Age: 51
End: 2021-02-17

## 2021-02-18 ENCOUNTER — VIRTUAL VISIT (OUTPATIENT)
Dept: FAMILY MEDICINE CLINIC | Age: 51
End: 2021-02-18
Payer: COMMERCIAL

## 2021-02-18 DIAGNOSIS — R73.03 PREDIABETES: ICD-10-CM

## 2021-02-18 DIAGNOSIS — R15.9 FECAL SOILING DUE TO FECAL INCONTINENCE: ICD-10-CM

## 2021-02-18 DIAGNOSIS — E55.9 HYPOVITAMINOSIS D: ICD-10-CM

## 2021-02-18 DIAGNOSIS — R32 URINARY INCONTINENCE IN FEMALE: Primary | ICD-10-CM

## 2021-02-18 DIAGNOSIS — R35.0 URINARY FREQUENCY: ICD-10-CM

## 2021-02-18 PROCEDURE — 99214 OFFICE O/P EST MOD 30 MIN: CPT | Performed by: FAMILY MEDICINE

## 2021-02-18 NOTE — PROGRESS NOTES
Chief Complaint   Patient presents with    Follow-up     Discuss getting incontinence supplies. [facility] Follow Up     01/04/2021     1. Have you been to the ER, urgent care clinic since your last visit? Hospitalized since your last visit? Yes 01/04/2021 [facility], Hernia surgery. 2. Have you seen or consulted any other health care providers outside of the [address] [place] [address] since your last visit? Include any pap smears or colon screening.  No

## 2021-02-18 NOTE — PROGRESS NOTES
Farzad Lazo is a 45 y.o. female who was seen by synchronous (real-time) audio-video technology on 2/18/2021. Consent: Farzad Lazo, who was seen by synchronous (real-time) audio-video technology, and/or her healthcare decision maker, is aware that this patient-initiated, Telehealth encounter on 2/18/2021 is a billable service, with coverage as determined by her insurance carrier. She is aware that she may receive a bill and has provided verbal consent to proceed: Yes. Assessment & Plan:   1. Urinary incontinence in female  Recommend labs first, if abnormal treat, if normal consider urogyn consultation  - URINALYSIS W/ REFLEX CULTURE; Future    2. Fecal soiling due to fecal incontinence  Recommend GI evaluation for this, DME filled out    3. Urinary frequency  As above  - URINALYSIS W/ REFLEX CULTURE; Future    4. Prediabetes  Labs fasting per orders  - HEMOGLOBIN A1C WITH EAG; Future  - METABOLIC PANEL, COMPREHENSIVE; Future  - MICROALBUMIN, UR, RAND W/ MICROALB/CREAT RATIO; Future  - LIPID PANEL; Future    5. Hypovitaminosis D  Labs per orders, can switch to otc supplement pending results if running out of prescription weekly vit d  - VITAMIN D, 25 HYDROXY; Future      DME paperwork filled out during visit, will fax back to supplier    Pt was counseled on risks, benefits and alternatives of treatment options. All questions were asked and answered and the patient was agreeable with the treatment plan as outlined. Subjective:    Farzad Lazo is a 45 y.o. female who was seen for Follow-up (Discuss getting incontinence supplies.) and Hospital Follow Up (01/04/2021)      Request for adult pull ups and gloves  She is having urinary and fecal incontinence  Symptoms are worsening recently  Patient reports some mobility impairment d/t lymphedema  She reports to me she has urgency and can't hold it  No diarrhea  No blood in urine or stool    Prediabetes--due for some labs, wants to get it checked        Medications, allergies, PMH, PSH, SOCH, [address] reviewed and updated per routine protocol, see chart for review and changes if not noted here. ROS  A 12 point review of systems was negative except as noted here or in the HPI. Objective:   Vital Signs: (As obtained by patient/caregiver at home)  Patient-Reported Vitals 2/18/2021   Patient-Reported Weight 216lb        [INSTRUCTIONS:  \"[x]\" Indicates a positive item  \"[]\" Indicates a negative item  -- DELETE ALL ITEMS NOT EXAMINED]    Constitutional: [x] Appears well-developed and well-nourished [x] No apparent distress      [] Abnormal -     Mental status: [x] Alert and awake  [x] Oriented to person/place/time [x] Able to follow commands    [] Abnormal -     Eyes:   EOM    [x]  Normal    [] Abnormal -   Sclera  [x]  Normal    [] Abnormal -          Discharge [x]  None visible   [] Abnormal -     HENT: [x] Normocephalic, atraumatic  [] Abnormal -   [x] Mouth/Throat: Mucous membranes are moist    External Ears [x] Normal  [] Abnormal -    Neck: [x] No visualized mass [] Abnormal -     Pulmonary/Chest: [x] Respiratory effort normal   [x] No visualized signs of difficulty breathing or respiratory distress        [] Abnormal -      Musculoskeletal:   [] Normal gait with no signs of ataxia         [x] Normal range of motion of neck        [] Abnormal -     Neurological:        [x] No Facial Asymmetry (Cranial nerve 7 motor function) (limited exam due to video visit)          [x] No gaze palsy        [] Abnormal -          Skin:        [x] No significant exanthematous lesions or discoloration noted on facial skin         [] Abnormal -            Psychiatric:       [x] Normal Affect [] Abnormal -        [x] No Hallucinations    Other pertinent observable physical exam findings:seated, wearing glasses    We discussed the expected course, resolution and complications of the diagnosis(es) in detail.   Medication risks, benefits, costs, interactions, and alternatives were discussed as indicated. I advised her to contact the office if her condition worsens, changes or fails to improve as anticipated. She expressed understanding with the diagnosis(es) and plan. Yunier Garcia is a 45 y.o. female who was evaluated by a video visit encounter for concerns as above. Patient identification was verified prior to start of the visit. A caregiver was present when appropriate. Due to this being a TeleHealth encounter ([facility] public [facility] emergency), evaluation of the following organ systems was limited: Vitals/Constitutional/EENT/Resp/CV/GI//MS/Neuro/Skin/Heme-Lymph-Imm. Pursuant to the emergency declaration under the [facility] waiver authority and the [employer] and [employer] Act, this Virtual  Visit was conducted, with patient's (and/or legal guardian's) consent, to reduce the patient's risk of exposure to COVID-19 and provide necessary medical care. Services were provided through a video synchronous discussion virtually to substitute for in-person clinic visit. Patient and provider were located at their individual homes. August Hsu MD  [facility]  02/18/21 2:21 PM     Portions of this note may have been populated using smart dictation software and may have \"sounds-like\" errors present.

## 2021-02-23 ENCOUNTER — TELEPHONE (OUTPATIENT)
Dept: FAMILY MEDICINE CLINIC | Age: 51
End: 2021-02-23

## 2021-02-23 NOTE — TELEPHONE ENCOUNTER
Pt called to let Dr Amira Luevano know that she has contacted [facility] to supply a BP monitor for her and they will be faxing the order to be signed.

## 2021-02-24 NOTE — TELEPHONE ENCOUNTER
[facility] faxed CMN for home blood pressure monitor. Forms placed in Dr. Barron Drop in basket for review.  Lee

## 2021-02-26 ENCOUNTER — TELEPHONE (OUTPATIENT)
Dept: FAMILY MEDICINE CLINIC | Age: 51
End: 2021-02-26

## 2021-02-26 NOTE — TELEPHONE ENCOUNTER
Returned pt's call. And she advises she is currently on the line with GI now scheduling an appointment.

## 2021-02-26 NOTE — TELEPHONE ENCOUNTER
----- Message from [employer] sent at 2/26/2021  4:10 PM EST -----  Regarding: Referral  Contact: [phone]  Referrals    Caller's relationship to patient (if not from a practice):NA    Name of caller (if calling from a practice): Self    Patient insurance Type    Name of practice:  [facility] in [facility]. Specialist's title, first, and last name: NA    Specialist Type: Gastroenterology     Office Phone Number:[phone]    Fax number: NA    Date and time of appointment:No appointment arranged yet. .     Reason for appointment: Following up on gastro issues discussed with MD Abdirashid Cummins. Details to clarify the request:  Patient to have appointment scheduled with the office soon, appointment not scheduled yet. Please follow up with office on referral and then contact patient once it has been sent.       [employer]

## 2021-03-16 ENCOUNTER — TRANSCRIBE ORDER (OUTPATIENT)
Dept: SCHEDULING | Age: 51
End: 2021-03-16

## 2021-03-16 DIAGNOSIS — Z12.31 VISIT FOR SCREENING MAMMOGRAM: Primary | ICD-10-CM

## 2021-03-24 LAB — COLONOSCOPY, EXTERNAL: NORMAL

## 2021-03-31 ENCOUNTER — TELEPHONE (OUTPATIENT)
Dept: SURGERY | Age: 51
End: 2021-03-31

## 2021-03-31 NOTE — TELEPHONE ENCOUNTER
Returned the call to [employer]- to speak with Addie Mares but she was not available. The  Petey Weldon was able to see the notes. Pt needs to have an EGD and Colonoscopy but needs to have a clearance from Dr. Luigi Castano before scheduling. She had a hernia repair back in January.  Will forward information to Dr. Luigi Castano regarding clearance request.

## 2021-03-31 NOTE — TELEPHONE ENCOUNTER
Paris Kennedy with [employer] [facility] called stating the patient had hernia surgery in January with Dr. Manuel Wallace and needs clearance for patient to have testing done.

## 2021-03-31 NOTE — TELEPHONE ENCOUNTER
Dr. Lacy Aguilar called office he has reviewed the message and informed that the pt has been 6 weeks from her surgery and she is ok to have the procedure. Called [facility] spoke with Theodore Downing and informed her that pt is ok to have the procedure per MD.  She will make notation but will need a letter of clearance. Informed MD was in the OR this morning but will fax letter later today. Fax [phone].

## 2021-04-07 ENCOUNTER — TELEPHONE (OUTPATIENT)
Dept: FAMILY MEDICINE CLINIC | Age: 51
End: 2021-04-07

## 2021-04-07 NOTE — TELEPHONE ENCOUNTER
Called pt and advised of orders placed at [facility]. Pt agrees to have labs drawn at [address] [place] [address] location, stating she will have them done tomorrow.  Lee

## 2021-04-07 NOTE — TELEPHONE ENCOUNTER
----- Message from Jesus Hernandez sent at 4/7/2021 12:41 PM EDT -----  Regarding: Dr. Yolanda Galeas Message/Vendor Calls    Caller's first and last name: n/a      Reason for call: Wants to know where to get blood work done.        Callback required yes/no and why: yes      Best contact number(s): [phone]      Details to clarify the request: [address]

## 2021-04-08 ENCOUNTER — HOSPITAL ENCOUNTER (OUTPATIENT)
Dept: MAMMOGRAPHY | Age: 51
Discharge: HOME | End: 2021-04-08
Attending: FAMILY MEDICINE
Payer: COMMERCIAL

## 2021-04-08 DIAGNOSIS — Z12.31 VISIT FOR SCREENING MAMMOGRAM: ICD-10-CM

## 2021-04-08 PROCEDURE — 77063 BREAST TOMOSYNTHESIS BI: CPT

## 2021-04-09 ENCOUNTER — TELEPHONE (OUTPATIENT)
Dept: FAMILY MEDICINE CLINIC | Age: 51
End: 2021-04-09

## 2021-04-09 DIAGNOSIS — R79.89 ABNORMAL LFTS: Primary | ICD-10-CM

## 2021-04-09 DIAGNOSIS — R31.9 HEMATURIA, UNSPECIFIED TYPE: ICD-10-CM

## 2021-04-09 NOTE — TELEPHONE ENCOUNTER
----- Message from Kasie Morrissey sent at 4/9/2021  2:56 PM EDT -----  Regarding: Dr. Fontaine Learn  Contact: [phone]  General Message/Vendor Calls    Caller's first and last name: Pt      Reason for call: Received results from most recent lab work done, and would like to speak to the nurse regarding her results. Callback required yes/no and why: Yes, requested.       Best contact number(s):[phone]      Details to clarify the request: N/a      Kasie Morrissey

## 2021-04-12 NOTE — TELEPHONE ENCOUNTER
Results from [employer], have been sent to pt via [employer].    She did also ask from results through that portal.

## 2021-04-21 ENCOUNTER — APPOINTMENT (OUTPATIENT)
Dept: PHYSICAL THERAPY | Age: 51
End: 2021-04-21

## 2021-04-29 ENCOUNTER — TELEPHONE (OUTPATIENT)
Dept: FAMILY MEDICINE CLINIC | Age: 51
End: 2021-04-29

## 2021-04-29 NOTE — TELEPHONE ENCOUNTER
Pt requesting call back at [phone] in response to message she left on 4/21/21 regarding repeat labs done 4/8/21.  Lee

## 2021-04-29 NOTE — TELEPHONE ENCOUNTER
Called and spoke with pt, and she has been advised labs have been ordered, and will go to [facility] lab and have labs drawn.

## 2021-05-25 ENCOUNTER — TRANSCRIBE ORDER (OUTPATIENT)
Dept: REGISTRATION | Age: 51
End: 2021-05-25

## 2021-05-25 ENCOUNTER — HOSPITAL ENCOUNTER (OUTPATIENT)
Dept: LAB | Age: 51
Discharge: HOME | End: 2021-05-25
Payer: COMMERCIAL

## 2021-05-25 DIAGNOSIS — Z01.812 PRE-PROCEDURAL LABORATORY EXAMINATIONS: Primary | ICD-10-CM

## 2021-05-25 DIAGNOSIS — Z01.812 PRE-PROCEDURAL LABORATORY EXAMINATIONS: ICD-10-CM

## 2021-05-25 PROCEDURE — U0003 INFECTIOUS AGENT DETECTION BY NUCLEIC ACID (DNA OR RNA); SEVERE ACUTE RESPIRATORY SYNDROME CORONAVIRUS 2 (SARS-COV-2) (CORONAVIRUS DISEASE [COVID-19]), AMPLIFIED PROBE TECHNIQUE, MAKING USE OF HIGH THROUGHPUT TECHNOLOGIES AS DESCRIBED BY CMS-2020-01-R: HCPCS

## 2021-05-26 LAB — SARS-COV-2, COV2NT: NOT DETECTED

## 2021-05-26 NOTE — PERIOP NOTES
[address] [place] [address] Dr Medrano Preprocedure Instructions      1. On the day of your surgery, please report to registration located on the 2nd floor of the  [facility]. yes    2. You must have a responsible adult to drive you to the hospital, stay at the hospital during your procedure and drive you home. If they leave your procedure will not be started (no exceptions). yes    3. Do not have anything to eat or drink (including water, gum, mints, coffee, and juice) after midnight. This does not apply to the medications you were instructed to take by your physician. yesIf you are currently taking Plavix, Coumadin, Aspirin, or other blood-thinning agents, contact your physician for special instructions. not applicable,    4. If you are having a procedure that requires bowel prep: We recommend that you have only clear liquids the day before your procedure and begin your bowel prep by 5:00 pm.  You may continue to drink clear liquids until midnight. If for any reason you are not able to complete your prep please notify your physician immediately. yes    5. Have a list of all current medications, including vitamins, herbal supplements and any other over the counter medications. yes    6. If you wear glasses, contacts, dentures and/or hearing aids, they may be removed prior to procedure, please bring a case to store them in. yes    7. You should understand that if you do not follow these instructions your procedure may be cancelled. If your physical condition changes (I.e. fever, cold or flu) please contact your doctor as soon as possible. 8. It is important that you be on time.   If for any reason you are unable to keep your appointment please call [phone] the day of or your physicians office prior to your scheduled procedure

## 2021-05-28 ENCOUNTER — HOSPITAL ENCOUNTER (OUTPATIENT)
Age: 51
Setting detail: OUTPATIENT SURGERY
Discharge: HOME | End: 2021-05-28
Attending: SPECIALIST | Admitting: SPECIALIST
Payer: COMMERCIAL

## 2021-05-28 ENCOUNTER — ANESTHESIA EVENT (OUTPATIENT)
Dept: ENDOSCOPY | Age: 51
End: 2021-05-28
Payer: COMMERCIAL

## 2021-05-28 ENCOUNTER — ANESTHESIA (OUTPATIENT)
Dept: ENDOSCOPY | Age: 51
End: 2021-05-28
Payer: COMMERCIAL

## 2021-05-28 VITALS
HEART RATE: 102 BPM | HEIGHT: 63 IN | BODY MASS INDEX: 36.72 KG/M2 | SYSTOLIC BLOOD PRESSURE: 140 MMHG | WEIGHT: 207.23 LBS | RESPIRATION RATE: 19 BRPM | OXYGEN SATURATION: 100 % | TEMPERATURE: 98.3 F | DIASTOLIC BLOOD PRESSURE: 76 MMHG

## 2021-05-28 PROCEDURE — 76060000031 HC ANESTHESIA FIRST 0.5 HR: Performed by: SPECIALIST

## 2021-05-28 PROCEDURE — 74011250637 HC RX REV CODE- 250/637: Performed by: SPECIALIST

## 2021-05-28 PROCEDURE — 77030021593 HC FCPS BIOP ENDOSC BSC -A: Performed by: SPECIALIST

## 2021-05-28 PROCEDURE — 74011250636 HC RX REV CODE- 250/636: Performed by: NURSE ANESTHETIST, CERTIFIED REGISTERED

## 2021-05-28 PROCEDURE — 2709999900 HC NON-CHARGEABLE SUPPLY: Performed by: SPECIALIST

## 2021-05-28 PROCEDURE — 76040000019: Performed by: SPECIALIST

## 2021-05-28 PROCEDURE — 74011250636 HC RX REV CODE- 250/636: Performed by: SPECIALIST

## 2021-05-28 PROCEDURE — 88305 TISSUE EXAM BY PATHOLOGIST: CPT

## 2021-05-28 RX ORDER — FENTANYL CITRATE 50 UG/ML
25 INJECTION, SOLUTION INTRAMUSCULAR; INTRAVENOUS AS NEEDED
Status: DISCONTINUED | OUTPATIENT
Start: 2021-05-28 | End: 2021-05-28 | Stop reason: HOSPADM

## 2021-05-28 RX ORDER — NALOXONE HYDROCHLORIDE 0.4 MG/ML
0.4 INJECTION, SOLUTION INTRAMUSCULAR; INTRAVENOUS; SUBCUTANEOUS
Status: DISCONTINUED | OUTPATIENT
Start: 2021-05-28 | End: 2021-05-28 | Stop reason: HOSPADM

## 2021-05-28 RX ORDER — SODIUM CHLORIDE 9 MG/ML
INJECTION, SOLUTION INTRAVENOUS
Status: DISCONTINUED | OUTPATIENT
Start: 2021-05-28 | End: 2021-05-28 | Stop reason: HOSPADM

## 2021-05-28 RX ORDER — FLUMAZENIL 0.1 MG/ML
0.2 INJECTION INTRAVENOUS
Status: DISCONTINUED | OUTPATIENT
Start: 2021-05-28 | End: 2021-05-28 | Stop reason: HOSPADM

## 2021-05-28 RX ORDER — PROPOFOL 10 MG/ML
INJECTION, EMULSION INTRAVENOUS
Status: DISCONTINUED | OUTPATIENT
Start: 2021-05-28 | End: 2021-05-28 | Stop reason: HOSPADM

## 2021-05-28 RX ORDER — MIDAZOLAM HYDROCHLORIDE 1 MG/ML
.25-5 INJECTION, SOLUTION INTRAMUSCULAR; INTRAVENOUS AS NEEDED
Status: DISCONTINUED | OUTPATIENT
Start: 2021-05-28 | End: 2021-05-28 | Stop reason: HOSPADM

## 2021-05-28 RX ORDER — DEXTROMETHORPHAN/PSEUDOEPHED 2.5-7.5/.8
1.2 DROPS ORAL
Status: DISCONTINUED | OUTPATIENT
Start: 2021-05-28 | End: 2021-05-28 | Stop reason: HOSPADM

## 2021-05-28 RX ORDER — SODIUM CHLORIDE 9 MG/ML
50 INJECTION, SOLUTION INTRAVENOUS CONTINUOUS
Status: DISCONTINUED | OUTPATIENT
Start: 2021-05-28 | End: 2021-05-28 | Stop reason: HOSPADM

## 2021-05-28 RX ADMIN — PROPOFOL INJECTABLE EMULSION 500 MCG/KG/MIN: 10 INJECTION, EMULSION INTRAVENOUS at 13:13

## 2021-05-28 RX ADMIN — SODIUM CHLORIDE: 9 INJECTION, SOLUTION INTRAVENOUS at 13:01

## 2021-05-28 NOTE — PROCEDURES
[address] [place] [address] JOE Judi [address]  [phone]      May 28, 2021    Esophagogastroduodenoscopy & Colonoscopy Procedure Note  Warren Barron  : 1970  [facility] Medical Record Number: [id number]      Indications:    Change in bowel habits Diarrhea   Referring Physician:  Kelsey Giron MD  Anesthesia/Sedation: Conscious Sedation/Moderate Sedation/MAC  Endoscopist:  Dr. Danica Del Real  Complications:  None  Estimated Blood Loss:  None    Permit:  The indications, risks, benefits and alternatives were reviewed with the patient or their decision maker who was provided an opportunity to ask questions and all questions were answered. The specific risks of esophagogastroduodenoscopy with conscious sedation were reviewed, including but not limited to anesthetic complication, bleeding, adverse drug reaction, missed lesion, infection, IV site reactions, and intestinal perforation which would lead to the need for surgical repair. Alternatives to EGD and colonoscopy including radiographic imaging, observation without testing, or laboratory testing were reviewed as well as the limitations of those alternatives discussed. After considering the options and having all their questions answered, the patient or their decision maker provided both verbal and written consent to proceed. -----------EGD------------   Procedure in Detail:  After obtaining informed consent, positioning of the patient in the left lateral decubitus position, and conduction of a pre-procedure pause or \"time out\" the endoscope was introduced into the mouth and advanced to the duodenum. A careful inspection was made, and findings or interventions are described below. Findings:   Esophagus:normal  Stomach: normal - cold forceps biopsies of antrum taken for histology.   Duodenum/jejunum: normal - cold forceps biopsies of duodenum taken for histology. ----------Colonoscopy-----------    Procedure in Detail:  After obtaining informed consent, positioning of the patient in the left lateral decubitus position, and conduction of a pre-procedure pause or \"time out\" the endoscope was introduced into the anus and advanced to the cecum, which was identified by the ileocecal valve and appendiceal orifice. The quality of the colonic preparation was good. A careful inspection was made as the colonoscope was withdrawn, findings and interventions are described below. Findings: There is diverticulosis in the sigmoid colon without complications such as bleeding, inflammatory change, or luminal narrowing. Normal colonic mucosa throughout, random cold forceps biopsies taken for histology and exclusion of microscopic colitis.    ------------------------------  Specimens:    See above    Complications:   None; patient tolerated the procedure well. Impressions:  EGD:  Normal EGD. Colonoscopy: Otherwise normal colonoscopy to the cecum      Recommendations:     - Await pathology. Thank you for entrusting me with this patient's care. Please do not hesitate to contact me with any questions or if I can be of assistance with any of your other patients' GI needs. Signed By: Keesha Ren MD                        May 28, 2021    Surgical assistant none. Implants none unless specified.

## 2021-05-28 NOTE — H&P
Telemedicine Visit     --------------------------------------------------------------------------------  Patient Name: Toña Howe   Account #: [id number]    Gender: Female    (age): 1970 (45)       Provider:     Titus Yao MD      Staff:     Josr Machado      Date: 3/24/2021 10:00 AM     Audio and/or Visual:   Audio and Visual Used  ? Audio and Visual Used      Referring Physician:     Errol Fung. [address], [address] [facility]  [phone] (phone)  [phone] (fax)     Armando Deleon MD  [address] [facility]. [address] [address]  [phone] (phone)  [phone] (fax)        Chief Complaint: Bloating, borborygmi           History of Present Illness: The patient called for an initial visit telemedicine appointment. For the past few months, she has had intermittent bloating and post-prandial borborygmi. The frequency of her bowel movements vary, but she denies chronic diarrhea. She has occasional fecal accidents but no bleeding. She denies any significant abdominal pain or dysphagia. She recently had a ventral hernia repair in January. She has not had a prior colonoscopy but has had a negative Cologuard in the past. ? The patient called for an initial visit telemedicine appointment. For the past few months, she has had intermittent bloating and post-prandial borborygmi. The frequency of her bowel movements vary, but she denies chronic diarrhea. She has occasional fecal accidents but no bleeding. She denies any significant abdominal pain or dysphagia. She recently had a ventral hernia repair in January. She has not had a prior colonoscopy but has had a negative Cologuard in the past. ?        Past Medical History      Medical Conditions: Fibroids  High blood pressure  Lymphedema  Ventral Hernia   Surgical Procedures: Ventral Hernia Repair at [facility] Mariann Virk, 2021  Uterine Fibroid Embolism, 2010  Other major surgeries:, D & C .3/01/2000   Dx Studies: CT Scan, 07/16/2020  Other________, Cologuard February 2020  Sonogram, 2020   Medications: Centrum Silver Women 8 mg iron-400 mcg-300 mcg Take 1 tablet by mouth once a day  ergocalciferol (vitamin D2) 1,250 mcg (50,000 unit) Take 1 capsule by mouth once a week   Allergies: Patient has no known allergies or drug allergies   Immunizations: No Immunizations      Social History      Alcohol: Alcohol consumption: Weekly 5 times a week. Tobacco: Former smoker   Drugs: None   Exercise: Exercise less than 3 times a week 1 times a day. Caffeine: None   Marital Status:          Occupation:               Family History No history of Colon Cancer, Colon Polyps, Esophogeal Cancer, GI Cancers, IBD (Crohn's or UC), Liver disease  Grandmother: Diagnosed with Kidney Failure; Other: Diagnosed with Ovarian Cancer;       Review of Systems:   Cardiovascular: Presents suffers from peripheral edema. Denies chest pain, irregular heart beat, palpitations, syncope, Sweats. Constitutional: Presents suffers from fatigue. Denies fever, loss of appetite, weight gain, weight loss. ENMT: Denies nose bleeds, sore throat, hearing loss. Endocrine: Denies excessive thirst, heat intolerance. Eyes: Denies loss of vision. Gastrointestinal: Presents suffers from abdominal swelling, diarrhea, Bloating/gas, Stool incontinence. Denies abdominal pain, change in bowel habits, constipation, heartburn, jaundice, nausea, rectal bleeding, stomach cramps, vomiting, dysphagia, rectal pain, hematemesis. Genitourinary: Presents suffers from frequent urination, incontinence. Denies dark urine, dysuria, hematuria. Hematologic/Lymphatic: Denies easy bruising, prolonged bleeding. Integumentary: Presents suffers from itching. Denies rashes, sun sensitivity. Musculoskeletal: Presents suffers from gout. Denies arthritis, back pain, joint pain, muscle weakness, stiffness. Neurological: Denies dizziness, fainting, frequent headaches, memory loss. Psychiatric: Presents suffers from anxiety, difficulty sleeping. Denies depression, hallucinations, nervousness, panic attacks, paranoia. Respiratory: Denies cough, dyspnea, wheezing. Vital Signs: reported by patient   Weight (lbs/oz) Height (ft/in) BMI   210 /  5 / 4 36.04      Physical Exam:   Physical exam was not performed. Lab Results: No Electronic Results      Impressions: Dyspepsia  Borborygmi  Screening colonoscopy (Screening for colonic neoplasia)? ? Dyspepsia? ?  ? ? Borborygmi? ?  ? ? Screening colonoscopy (Screening for colonic neoplasia)? Assessment: I offered her reassurance in regards to her borborygmi. It seems reasonable to proceed with a screening colonoscopy, and she would like to have an EGD as well. Given her recent ventral hernia repair, we will get clearance from her surgeon prior to scheduling. ?I offered her reassurance in regards to her borborygmi. It seems reasonable to proceed with a screening colonoscopy, and she would like to have an EGD as well. Given her recent ventral hernia repair, we will get clearance from her surgeon prior to scheduling. Plan: Education handout on BMI Healthy Weight  Colonoscopy  Upper Endoscopy  Colonoscopy/Biopsy/Polypectomy: options, risks, benefits and complications of bleeding, tear, surgical repair (1:1000) & 1:100 post Polypectomy bleed, and reaction of medication, aspiration, Pneumonia explained to patient, 5% chance of missing colon cancer and other lesions explained. All questions answered. Upper Endoscopy (EGD): options, risks, benefits and complications of bleeding, tear, surgical repair (1:1000) and reaction to medication, aspiration, and pneumonia explained to patient. 5% chance of missing lesions explained. All questions answered. Plenvu 140-9-5.2 gram Use as directed as bowel prep for colonoscopy? ?Education handout on BMI Healthy Weight? ?  ? ? Colonoscopy? ?  ? ? Upper Endoscopy?  ?  ? ?Colonoscopy/Biopsy/Polypectomy: options, risks, benefits and complications of bleeding, tear, surgical repair (1:1000) & 1:100 post Polypectomy bleed, and reaction of medication, aspiration, Pneumonia explained to patient, 5% chance of missing colon cancer and other lesions explained. All questions answered. ? ?  ? ? Upper Endoscopy (EGD): options, risks, benefits and complications of bleeding, tear, surgical repair (1:1000) and reaction to medication, aspiration, and pneumonia explained to patient. 5% chance of missing lesions explained. All questions answered. ? ?  ? ? Plenvu? ?140-9-5.2 gram? ? Use as directed as bowel prep for colonoscopy? ? Risk & Medical Necessity: The level of medical decision making for this visit is moderate. Duration of Video Call:   10 minutes  ?  10 minutes      Notes:              Katherina Bosworth, MD     Electronically signed on 3/25/2021 8:16:49 AM by Katherina Bosworth, MD                                 . [address], MRN [id number],  1970 Telemedicine New Patient, 2021

## 2021-05-28 NOTE — PROGRESS NOTES
Endoscopy discharge instructions have been reviewed and given to patient. The patient verbalized understanding and acceptance of instructions. Dr. El Sofia discussed with patient's daughter  procedure findings and next steps.

## 2021-05-28 NOTE — PROGRESS NOTES
Anesthesia staff at patient's bedside administering anesthesia and monitoring patients vital signs throughout procedure. See anesthesia note. ABD remains soft and non-tender post procedure. Pt has no complaints at this time and tolerated the procedure well. Endoscope was pre-cleaned at bedside immediately following procedure by Azar Smith.

## 2021-05-28 NOTE — ANESTHESIA PREPROCEDURE EVALUATION
Relevant Problems   ENDOCRINE   (+) Obesity, morbid (HCC)      HEMATOLOGY   (+) Anemia       Anesthetic History   No history of anesthetic complications            Review of Systems / Medical History  Patient summary reviewed, nursing notes reviewed and pertinent labs reviewed    Pulmonary  Within defined limits                 Neuro/Psych   Within defined limits           Cardiovascular                  Exercise tolerance: >4 METS     GI/Hepatic/Renal  Within defined limits              Endo/Other        Morbid obesity     Other Findings   Comments: Pre-Diabetic    Left leg lymphedema           Physical Exam    Airway  Mallampati: III  TM Distance: 4 - 6 cm  Neck ROM: normal range of motion   Mouth opening: Diminished (comment)     Cardiovascular  Regular rate and rhythm,  S1 and S2 normal,  no murmur, click, rub, or gallop             Dental    Dentition: Lower dentition intact and Upper dentition intact  Comments: Two loose teeth on the bottom   Pulmonary  Breath sounds clear to auscultation               Abdominal         Other Findings            Anesthetic Plan    ASA: 2  Anesthesia type: MAC          Induction: Intravenous  Anesthetic plan and risks discussed with: Patient

## 2021-05-28 NOTE — INTERVAL H&P NOTE
Pre-Endoscopy H&P Update  Chief complaint/HPI/ROS:  The indication for the procedure, the patient's history and the patient's current medications are reviewed prior to the procedure and that data is reported on the H&P to which this document is attached. Any significant complaints with regard to organ systems will be noted, and if not mentioned then a review of systems is not contributory. Past Medical History:   Diagnosis Date    Anemia     Fibroids     2010 - embolization     Herpes genitalia     positive    Ill-defined condition     lympedema    Obesity, Class II, BMI 35-39.9     Prediabetes       Past Surgical History:   Procedure Laterality Date    HX COLPOSCOPY      WNL per patient    HX GYN  2011    Uterine fibroid embolization. HX HERNIA REPAIR  2021    Robotic-assisted laparoscopic supraumbilical herniorrhaphy with mesh. HX MYOMECTOMY      HX TONSILLECTOMY       Social   Social History     Tobacco Use    Smoking status: Former Smoker     Packs/day: 0.20     Years: 5.00     Pack years: 1.00     Types: Cigarettes     Quit date: 12/10/2011     Years since quittin.4    Smokeless tobacco: Never Used    Tobacco comment: 2-3 cigarettes a week   Substance Use Topics    Alcohol use: Yes     Alcohol/week: 2.5 standard drinks     Types: 1 Glasses of wine, 1 Cans of beer, 1 Shots of liquor per week     Comment: social       Family History   Problem Relation Age of Onset    Diabetes Mother     Cancer Maternal Grandmother         ovary     Kidney Disease Maternal Grandmother     Cancer Other         endometrial    Cancer Other         aunt    Cancer Other     Diabetes Other     Ovarian Cancer Paternal Aunt       No Known Allergies   Prior to Admission Medications   Prescriptions Last Dose Informant Patient Reported? Taking? MULTIVITAMIN WITH MINERALS (HAIR,SKIN AND NAILS PO) 2021 at Unknown time  Yes Yes   Sig: Take  by mouth.    Shower Chair XX sharon   No No   Sig: Please dispense bariatric shower bench   ergocalciferol (ERGOCALCIFEROL) 1,250 mcg (50,000 unit) capsule 4/28/2021 at Unknown time  Yes Yes   Sig: TAKE 1 CAPSULE BY MOUTH ONE TIME PER WEEK   ferrous sulfate (IRON) 325 mg (65 mg iron) tablet 5/21/2021 at Unknown time  No Yes   Sig: Take 1 Tab by mouth Daily (before breakfast). multivitamin,tx-iron-ca-min (THERA-M) 27-0.4 mg Tab 5/21/2021 at Unknown time  Yes Yes   Sig: Take 1 Tab by mouth every other day. omega-3 fatty acids-vitamin e (FISH OIL) 1,000 mg Cap 5/21/2021 at Unknown time  Yes Yes   Sig: Take 1 Cap by mouth. Facility-Administered Medications: None       PHYSICAL EXAM:  The patient is examined immediately prior to the procedure. Visit Vitals  BP (!) 161/84   Pulse (!) 110   Temp 98.7 °F (37.1 °C)   Resp 17   Ht 5' 3\" (1.6 m)   Wt 94 kg (207 lb 3.7 oz)   SpO2 100%   Breastfeeding No   BMI 36.71 kg/m²     Gen: Appears comfortable, no distress. Pulm: comfortable respirations with no abnormal audible breath sounds  HEART: well perfused, no abnormal audible heart sounds  GI: abdomen flat. PLAN:  Informed consent discussion held, patient afforded an opportunity to ask questions and all questions answered. After being advised of the risks, benefits, and alternatives, the patient requested that we proceed and indicated so on a written consent form. Will proceed with procedure as planned.   Gabe Quinonez MD

## 2021-05-28 NOTE — ANESTHESIA POSTPROCEDURE EVALUATION
Procedure(s):  COLONOSCOPY  EGD  ESOPHAGOGASTRODUODENAL (EGD) BIOPSY  COLON BIOPSY. MAC    Anesthesia Post Evaluation      Multimodal analgesia: multimodal analgesia used between 6 hours prior to anesthesia start to PACU discharge  Patient location during evaluation: bedside  Patient participation: complete - patient participated  Level of consciousness: awake  Pain management: adequate  Airway patency: patent  Anesthetic complications: no  Cardiovascular status: acceptable  Respiratory status: acceptable  Hydration status: acceptable        INITIAL Post-op Vital signs:   Vitals Value Taken Time   /76 05/28/21 1353   Temp 36.8 °C (98.3 °F) 05/28/21 1339   Pulse 100 05/28/21 1356   Resp 18 05/28/21 1356   SpO2 100 % 05/28/21 1356   Vitals shown include unvalidated device data.

## 2021-05-28 NOTE — PROGRESS NOTES
Tito Velez  1970  [id number]    Situation:  Verbal report received from:  Alexia Martins RN   Procedure: Procedure(s):  COLONOSCOPY  EGD  ESOPHAGOGASTRODUODENAL (EGD) BIOPSY  COLON BIOPSY    Background:    Preoperative diagnosis: SCREENING, DYSPEPSIA  Postoperative diagnosis: 1. normal EGD  2. diverticulosis    :  Dr. Patricia Pizano   Assistant(s): Endoscopy Technician-1: Radha Leonardo  Endoscopy RN-1: Kaylah Marvin    Specimens:   ID Type Source Tests Collected by Time Destination   1 : Antrum Biopsy Preservative   Kelly Mancuso MD 5/28/2021 1319 Pathology   2 : Biopsy Preservative Duodenum  Kelly Mancuso MD 5/28/2021 1320 Pathology   3 : Random Colon Biopsies Preservative   Kelly Mancuso MD 5/28/2021 1326 Pathology     H. Pylori  no    Assessment:  Intra-procedure medications       Anesthesia gave intra-procedure sedation and medications, see anesthesia flow sheet yes    Intravenous fluids: NS@ KVO     Vital signs stable   yes    Abdominal assessment: round and soft   yes    Recommendation:  Discharge patient per MD order  yes.   Return to floor  outpatient  Family or Friend daughter   Permission to share finding with family or friend yes

## 2021-05-28 NOTE — DISCHARGE INSTRUCTIONS
[address] [facility] JOE Padilla MD  [phone]      May 28, 2021    Madai Marie  YOB: 1970    COLONOSCOPY DISCHARGE INSTRUCTIONS    If there is redness at IV site you should apply warm compress to area. If redness or soreness persist contact Dr. Hannah Padilla' or your primary care doctor. There may be a slight amount of blood passed from the rectum. Gaseous discomfort may develop, but walking, belching will help relieve this. You may not operate a vehicle for 12 hours  You may not operate machinery or dangerous appliances for rest of today  You may not drink alcoholic beverages for 12 hours  Avoid making any critical decisions for 24 hours    DIET:  You may resume your normal diet, but some patients find that heavy or large meals may lead to indigestion or vomiting. I suggest a light meal as first food intake. MEDICATIONS:  The use of some over-the-counter pain medication may lead to bleeding after colon biopsies or polyp removal.  Tylenol (also called acetaminophen) is safe to take even if you have had colonoscopy with polyp removal.  Based on the procedure you had today you may not safely take aspirin or aspirin-like products for the next ten (10) days. Remember that Tylenol (also called acetaminophen) is safe to take after colonoscopy even if you have had biopsies or polyps removed. ACTIVITY:  You may resume your normal household activities, but it is recommended that you spend the remainder of the day resting -  avoid any strenuous activity.     CALL DR. Zac Hsu' OFFICE IF:  Increasing pain, nausea, vomiting  Abdominal distension (swelling)  Significant new or increased bleeding (oral or rectal)  Fever/Chills  Chest pain/shortness of breath                       Additional instructions:   Great news, normal examination today, but because of your symptoms we took biopsies of what looks normal to ensure that the tissue is healthy even under the microscope. I'll contact you with those results in about a week. It was an honor to be your doctor today. Please let me or my office staff know if you have any feedback about today's procedure. Emmie Snyder MD    Colonoscopy saves lives, and can prevent colon cancer. Everyone aged 48 or older needs colonoscopy.   Tell your family and friends: get the test!

## 2021-07-27 ENCOUNTER — TELEPHONE (OUTPATIENT)
Dept: FAMILY MEDICINE CLINIC | Age: 51
End: 2021-07-27

## 2021-07-27 NOTE — TELEPHONE ENCOUNTER
Can try to keep appt tomorrow, if worsening symptoms, fever, sob, chest pain, dizziness, then to ER tonight  If I can't see well on video may need to be seen by someone with eyes in person

## 2021-07-27 NOTE — TELEPHONE ENCOUNTER
Pt called and scheduled virtual appointment tomorrow with Dr. Ammon Aldana for painful swelling of left leg, noting history of lymphedema, increased swelling for about 5 days, usually decreasing daily, but leg is hard and painful today. Pt denies chest pain, shortness of breath, but notes some discoloration of leg, declined urgent care or ER, wants to see Dr. Ammon Aldana if possible. [facility] to view and advise if ok to keep virtual visit tomorrow, change to in person visit, or go to ER.  Lee

## 2021-07-28 ENCOUNTER — OFFICE VISIT (OUTPATIENT)
Dept: FAMILY MEDICINE CLINIC | Age: 51
End: 2021-07-28
Payer: COMMERCIAL

## 2021-07-28 VITALS
HEART RATE: 97 BPM | WEIGHT: 207 LBS | BODY MASS INDEX: 36.68 KG/M2 | SYSTOLIC BLOOD PRESSURE: 127 MMHG | HEIGHT: 63 IN | DIASTOLIC BLOOD PRESSURE: 66 MMHG

## 2021-07-28 DIAGNOSIS — N91.2 AMENORRHEA: ICD-10-CM

## 2021-07-28 DIAGNOSIS — M79.89 LEFT LEG SWELLING: ICD-10-CM

## 2021-07-28 DIAGNOSIS — I89.0 LYMPHEDEMA OF LEFT LOWER EXTREMITY: Primary | ICD-10-CM

## 2021-07-28 DIAGNOSIS — M21.962: ICD-10-CM

## 2021-07-28 PROCEDURE — 99443 PR PHYS/QHP TELEPHONE EVALUATION 21-30 MIN: CPT | Performed by: FAMILY MEDICINE

## 2021-07-28 RX ORDER — FUROSEMIDE 40 MG/1
40 TABLET ORAL DAILY
Qty: 30 TABLET | Refills: 1 | Status: SHIPPED | OUTPATIENT
Start: 2021-07-28 | End: 2022-08-18 | Stop reason: SDUPTHER

## 2021-07-28 NOTE — PROGRESS NOTES
Evonne Contreras is a 45 y.o. female who was seen by synchronous (real-time) audio-video technology on 7/28/2021. Consent: Evonne Contreras, who was seen by synchronous (real-time) audio-video technology, and/or her healthcare decision maker, is aware that this patient-initiated, Telehealth encounter on 7/28/2021 is a billable service, with coverage as determined by her insurance carrier. She is aware that she may receive a bill and has provided verbal consent to proceed: Yes. ADDENDUM--TELEPHONE ONLY--VIDEO DID NOT WORK, PATIENT UPLOADED PICTURES FOR ME TO REVIEW AS A MIDDLE GROUND  Assessment & Plan:   1. Lymphedema of left lower extremity  Immobility and lymphedema as risk  Check dvt  Add lasix  Recheck 2 wk  Bmp 2 wk  Lymphedema clinic  - DUPLEX LOWER EXT VENOUS LEFT; Future  - REFERRAL TO LYMPHEDEMA CLINIC    2. Left leg swelling  As above  - DUPLEX LOWER EXT VENOUS LEFT; Future  - furosemide (LASIX) 40 mg tablet; Take 1 Tablet by mouth daily. Dispense: 30 Tablet; Refill: 1  - METABOLIC PANEL, BASIC; Future    3. Acquired calf asymmetry, left  As above  - DUPLEX LOWER EXT VENOUS LEFT; Future    4. Amenorrhea  Suspect menopause, will watch    Er/return/emergency precautions discussed  Pt to elevate when able  Stay mobile if able            Pt was counseled on risks, benefits and alternatives of treatment options. All questions were asked and answered and the patient was agreeable with the treatment plan as outlined. Total time on the date of encounter exceeded 20 minutes and included patient care, coordination of care, charting and preparation for visit. Subjective:    Evonne Contreras is a 45 y.o. female who was seen for Leg Swelling      Has chronic lymphedema  Lately the patient is noticing marked assymmetry with LLE as compared to RLE  She is starting to have pain and her calf feels hard and quite painful to the touch  She is hoping to avoid the ER if she can    She did get to the lymphedema clinic, she has an appt for 8/12 and she'll need an appt from me    No SOB or difficulty breathing    Does not have water pills at home    LMP was about 3 m ago, 5/9/21, pt suspects menopause  She has monthly cramping wihtout bleeding    No family hx of blood clots  No personal hx of blood clots    Pt suspects this is likely lymphedema exacerbation  She is a , works from home, she is less mobile that usual and thinks that is at play here    Eating more, moving less      Medications, allergies, PMH, PSH, SOCH, [facility] [facility] reviewed and updated per routine protocol, see chart for review and changes if not noted here. ROS  A 12 point review of systems was negative except as noted here or in the HPI. Objective:   Vital Signs: (As obtained by patient/caregiver at home)  Visit Vitals  /66   Pulse 97   Ht 5' 3.2\" (1.605 m)   Wt 207 lb (93.9 kg)   BMI 36.44 kg/m²     Other pertinent observable physical exam findings: We discussed the expected course, resolution and complications of the diagnosis(es) in detail. Medication risks, benefits, costs, interactions, and alternatives were discussed as indicated. I advised her to contact the office if her condition worsens, changes or fails to improve as anticipated. She expressed understanding with the diagnosis(es) and plan. Courtney Tirado is a 45 y.o. female who was evaluated by a video visit encounter for concerns as above. Patient identification was verified prior to start of the visit. A caregiver was present when appropriate. Due to this being a TeleHealth encounter (During [facility] public health emergency), evaluation of the following organ systems was limited: Vitals/Constitutional/EENT/Resp/CV/GI//MS/Neuro/Skin/Heme-Lymph-Imm.   Pursuant to the emergency declaration under the [facility], [address] [place] [address] authority and the [employer] and [employer] Act, this Virtual  Visit was conducted, with patient's (and/or legal guardian's) consent, to reduce the patient's risk of exposure to COVID-19 and provide necessary medical care. Services were provided through a video synchronous discussion virtually to substitute for in-person clinic visit. Patient and provider were located at their individual homes. Nabeel Christianson MD  [facility]  07/28/21 10:45 AM     Portions of this note may have been populated using smart dictation software and may have \"sounds-like\" errors present.

## 2021-07-29 ENCOUNTER — HOSPITAL ENCOUNTER (OUTPATIENT)
Dept: VASCULAR SURGERY | Age: 51
Discharge: HOME | End: 2021-07-29
Attending: FAMILY MEDICINE
Payer: COMMERCIAL

## 2021-07-29 DIAGNOSIS — I89.0 LYMPHEDEMA OF LEFT LOWER EXTREMITY: ICD-10-CM

## 2021-07-29 DIAGNOSIS — M21.962: ICD-10-CM

## 2021-07-29 DIAGNOSIS — M79.89 LEFT LEG SWELLING: ICD-10-CM

## 2021-07-29 PROCEDURE — 93971 EXTREMITY STUDY: CPT

## 2021-07-30 ENCOUNTER — TELEPHONE (OUTPATIENT)
Dept: FAMILY MEDICINE CLINIC | Age: 51
End: 2021-07-30

## 2021-07-30 NOTE — TELEPHONE ENCOUNTER
Please notify patient there does not appear to be a blood clot (good!)  Lasix should be taken in the morning  Yes, she may have a handicapped form, it will be prepared for her and at the  as of today

## 2021-07-30 NOTE — TELEPHONE ENCOUNTER
Pt called for results of ultrasound and is also asking what time of day she is to take the lasix. She is also asking if Dr Madi Olvera would fill out a handicap parking form for her.      Please call [phone]

## 2021-07-30 NOTE — TELEPHONE ENCOUNTER
Pt agrees to  form on Monday, states she's taking the Lasix as directed,  her leg is feeling better, and swelling is down. Pt also seemed aware of negative result, stating her sonogram was okay.  Lee

## 2021-08-16 ENCOUNTER — TELEPHONE (OUTPATIENT)
Dept: FAMILY MEDICINE CLINIC | Age: 51
End: 2021-08-16

## 2021-08-16 NOTE — TELEPHONE ENCOUNTER
Pt called for appointment with Dr. Veronica Ye in office this week if possible due to concerns about dizziness and leg swelling after her mom was diagnosed with heart failure, stating she's also had symptoms similar to her mom's and wants EKG and labs if needed. Pt scheduled for first available appointment (virtual visit 8/23/21) and will call Thursday or Friday for same day appointment, but wants to be seen sooner if possible.  Lee

## 2021-08-16 NOTE — TELEPHONE ENCOUNTER
If acute concern seek UC for resource, would prefer she be seen in person vs virtual if we have space

## 2021-08-17 NOTE — TELEPHONE ENCOUNTER
Called and spoke with pt. She advises she is not currently having any symptoms. Pt has been rescheduled for 08/26/2021.

## 2021-12-15 ENCOUNTER — VIRTUAL VISIT (OUTPATIENT)
Dept: FAMILY MEDICINE CLINIC | Age: 51
End: 2021-12-15
Payer: COMMERCIAL

## 2021-12-15 DIAGNOSIS — Z28.310 COVID-19 VACCINE SERIES DECLINED: ICD-10-CM

## 2021-12-15 DIAGNOSIS — Z28.21 COVID-19 VACCINE SERIES DECLINED: ICD-10-CM

## 2021-12-15 DIAGNOSIS — Z71.85 VACCINE COUNSELING: Primary | ICD-10-CM

## 2021-12-15 PROCEDURE — 99213 OFFICE O/P EST LOW 20 MIN: CPT | Performed by: FAMILY MEDICINE

## 2021-12-15 NOTE — PROGRESS NOTES
Micaela Salinas is a 45 y.o. female who was seen by synchronous (real-time) audio-video technology on 12/15/2021. Consent: Micaela Salinas, who was seen by synchronous (real-time) audio-video technology, and/or her healthcare decision maker, is aware that this patient-initiated, Telehealth encounter on 12/15/2021 is a billable service, with coverage as determined by her insurance carrier. She is aware that she may receive a bill and has provided verbal consent to proceed: Yes. Assessment & Plan:   1. Vaccine counseling    2. COVID-19 vaccine series declined  Counseled patient regarding vaccine  Without medical indication NOT to get the vaccine it does not follow my practice to provide patient with a medical excuse to not be vaccinated so I am unable to fill out this form for her    Encouraged her on vaccination and safety            Pt was counseled on risks, benefits and alternatives of treatment options. All questions were asked and answered and the patient was agreeable with the treatment plan as outlined. Subjective: Micaela Salinas is a 45 y.o. female who was seen for Follow-up (Covid-19 questions)      Pt wants to discuss a new job opportunity   She had to do a tb test even though she is remote  She tells me she is still on the fence about the vaccine  She has a waiver form for the vaccine    She knows that the vaccine is recommendedshe's just not ready yet to get ti    Medications, allergies, PMH, PSH, SOCH, YUE DUNN OF [facility] reviewed and updated per routine protocol, see chart for review and changes if not noted here. ROS  A 12 point review of systems was negative except as noted here or in the HPI.     Objective:   Vital Signs: (As obtained by patient/caregiver at home)  Patient-Reported Vitals 12/15/2021   Patient-Reported Weight 210 lbs   Patient-Reported Height 5'2\"   Patient-Reported Pulse 87   Patient-Reported Temperature 98.6   Patient-Reported Systolic  957   Patient-Reported Diastolic 76 Patient-Reported LMP 11/21/21        [INSTRUCTIONS:  \"[x]\" Indicates a positive item  \"[]\" Indicates a negative item  -- DELETE ALL ITEMS NOT EXAMINED]    Constitutional: [x] Appears well-developed and well-nourished [x] No apparent distress      [] Abnormal -     Mental status: [x] Alert and awake  [x] Oriented to person/place/time [x] Able to follow commands    [] Abnormal -     Eyes:   EOM    [x]  Normal    [] Abnormal -   Sclera  [x]  Normal    [] Abnormal -          Discharge [x]  None visible   [] Abnormal -     HENT: [x] Normocephalic, atraumatic  [] Abnormal -   [x] Mouth/Throat: Mucous membranes are moist    External Ears [x] Normal  [] Abnormal -    Neck: [x] No visualized mass [] Abnormal -     Pulmonary/Chest: [x] Respiratory effort normal   [x] No visualized signs of difficulty breathing or respiratory distress        [] Abnormal -      Musculoskeletal:   [] Normal gait with no signs of ataxia         [x] Normal range of motion of neck        [] Abnormal -     Neurological:        [x] No Facial Asymmetry (Cranial nerve 7 motor function) (limited exam due to video visit)          [x] No gaze palsy        [] Abnormal -          Skin:        [x] No significant exanthematous lesions or discoloration noted on facial skin         [] Abnormal -            Psychiatric:       [x] Normal Affect [] Abnormal -        [x] No Hallucinations    Other pertinent observable physical exam findings:seated, wearing glasses, no distress, non toxic    We discussed the expected course, resolution and complications of the diagnosis(es) in detail. Medication risks, benefits, costs, interactions, and alternatives were discussed as indicated. I advised her to contact the office if her condition worsens, changes or fails to improve as anticipated. She expressed understanding with the diagnosis(es) and plan. Ericka Spears is a 45 y.o. female who was evaluated by a video visit encounter for concerns as above.  Patient identification was verified prior to start of the visit. A caregiver was present when appropriate. Due to this being a TeleHealth encounter (During QWKWL-68 public health emergency), evaluation of the following organ systems was limited: Vitals/Constitutional/EENT/Resp/CV/GI//MS/Neuro/Skin/Heme-Lymph-Imm. Pursuant to the emergency declaration under the [address] [place] [address], [facility] waiver authority and the [employer] and [employer] Act, this Virtual  Visit was conducted, with patient's (and/or legal guardian's) consent, to reduce the patient's risk of exposure to COVID-19 and provide necessary medical care. Services were provided through a video synchronous discussion virtually to substitute for in-person clinic visit. Patient and provider were located at their individual homes. Janett Matthew MD  [facility]  12/15/21 12:04 PM     Portions of this note may have been populated using smart dictation software and may have \"sounds-like\" errors present.

## 2021-12-15 NOTE — PROGRESS NOTES
Chief Complaint   Patient presents with    Follow-up     Covid-19 questions     1. Have you been to the ER, urgent care clinic since your last visit? Hospitalized since your last visit? No    2. Have you seen or consulted any other health care providers outside of the [address] [place] [address] since your last visit? Include any pap smears or colon screening.  No

## 2022-01-05 ENCOUNTER — TELEPHONE (OUTPATIENT)
Dept: FAMILY MEDICINE CLINIC | Age: 52
End: 2022-01-05

## 2022-01-05 NOTE — TELEPHONE ENCOUNTER
Ke Tenorio called from [facility] to advise Dr. Ania Villarreal they are unable to process pt's order for incontinence supplies due to diagnosis codes submitted not being accepted by Medicaid. Urinary incontinence code must be more specific such as urge or stress incontinence. Fecal incontinence code used also rejected. New form being faxed by [facility] for Dr. Ania Villarreal to complete as corrections are not accepted.  Lee

## 2022-03-18 PROBLEM — R73.03 PREDIABETES: Status: ACTIVE | Noted: 2020-12-08

## 2022-03-18 PROBLEM — E66.01 OBESITY, MORBID (HCC): Status: ACTIVE | Noted: 2020-01-15

## 2022-03-19 PROBLEM — I89.0 LYMPHEDEMA OF LEFT LOWER EXTREMITY: Status: ACTIVE | Noted: 2020-12-08

## 2022-03-19 PROBLEM — Z98.890 S/P LAPAROSCOPIC HERNIA REPAIR: Status: ACTIVE | Noted: 2021-01-19

## 2022-03-19 PROBLEM — Z87.19 S/P LAPAROSCOPIC HERNIA REPAIR: Status: ACTIVE | Noted: 2021-01-19

## 2022-04-07 ENCOUNTER — TELEPHONE (OUTPATIENT)
Dept: FAMILY MEDICINE CLINIC | Age: 52
End: 2022-04-07

## 2022-04-07 ENCOUNTER — NURSE TRIAGE (OUTPATIENT)
Dept: OTHER | Facility: CLINIC | Age: 52
End: 2022-04-07

## 2022-04-07 NOTE — TELEPHONE ENCOUNTER
Received call from Kevin at [facility] with Red Flag Complaint. Subjective: Caller states \"leg swelling\"     Current Symptoms:   Left leg swelling, \"rock hard\" in spots, non pitting in the areas that give  Foot to calf  Pins/numbness in her foot at times  On/off for a long time, worsening for a week  Keloids-discolored but not red  Was going to lymphedema clinic, been about a year  Had to stop going once she started working, was unable to go during the day  Goes down overnight but is back by the end of the day  Aching from the knee down    Onset: 1 week ago; worsening    Associated Symptoms: NA    Pain Severity: 4/10; aching; intermittent    Temperature: denies     What has been tried: rest    LMP: NA Pregnant: NA    Recommended disposition: Go to ED Now (Or to Office with PCP Approval)    Care advice provided, patient verbalizes understanding; denies any other questions or concerns; instructed to call back for any new or worsening symptoms. Writer provided warm transfer to Thierno Peterson at [facility] for 2nd level triage. Attention Provider: Thank you for allowing me to participate in the care of your patient. The patient was connected to triage in response to information provided to the ECC. Please do not respond through this encounter as the response is not directed to a shared pool.     Reason for Disposition   Thigh, calf, or ankle swelling in only one leg    Protocols used: LEG SWELLING AND EDEMA-ADULT-OH

## 2022-04-07 NOTE — TELEPHONE ENCOUNTER
Triage nurse Linette Morales called from [facility] ([facility]) with recommendation for pt to go to ER due to symptoms of left leg swelling, rock hard to touch, with pain and numbness from knee down. Pt not seen for Lymphedema in over a year and agrees to schedule with Lymphedema Clinic referred to by Dr. Maritza Ortega, but will seek urgent medical care for symptoms today as recommended by the nurse.  Lee

## 2022-08-03 ENCOUNTER — TELEPHONE (OUTPATIENT)
Dept: FAMILY MEDICINE CLINIC | Age: 52
End: 2022-08-03

## 2022-08-18 ENCOUNTER — OFFICE VISIT (OUTPATIENT)
Dept: FAMILY MEDICINE CLINIC | Age: 52
End: 2022-08-18
Payer: COMMERCIAL

## 2022-08-18 VITALS
BODY MASS INDEX: 36.68 KG/M2 | HEART RATE: 110 BPM | DIASTOLIC BLOOD PRESSURE: 86 MMHG | WEIGHT: 207 LBS | TEMPERATURE: 97.3 F | SYSTOLIC BLOOD PRESSURE: 144 MMHG | HEIGHT: 63 IN | RESPIRATION RATE: 18 BRPM

## 2022-08-18 DIAGNOSIS — M79.89 LEFT LEG SWELLING: ICD-10-CM

## 2022-08-18 DIAGNOSIS — R26.2 AMBULATORY DYSFUNCTION: ICD-10-CM

## 2022-08-18 DIAGNOSIS — I89.0 LYMPHEDEMA: Primary | ICD-10-CM

## 2022-08-18 DIAGNOSIS — R73.03 PREDIABETES: ICD-10-CM

## 2022-08-18 DIAGNOSIS — Z12.31 ENCOUNTER FOR SCREENING MAMMOGRAM FOR MALIGNANT NEOPLASM OF BREAST: ICD-10-CM

## 2022-08-18 DIAGNOSIS — E55.9 HYPOVITAMINOSIS D: ICD-10-CM

## 2022-08-18 DIAGNOSIS — I89.0 LYMPHEDEMA OF LEFT LOWER EXTREMITY: ICD-10-CM

## 2022-08-18 PROCEDURE — 99214 OFFICE O/P EST MOD 30 MIN: CPT | Performed by: FAMILY MEDICINE

## 2022-08-18 RX ORDER — FUROSEMIDE 40 MG/1
40 TABLET ORAL DAILY
Qty: 90 TABLET | Refills: 1 | Status: SHIPPED | OUTPATIENT
Start: 2022-08-18

## 2022-08-18 RX ORDER — CANE TIPS
EACH MISCELLANEOUS
Qty: 1 EACH | Refills: 0 | Status: SHIPPED | OUTPATIENT
Start: 2022-08-18

## 2022-08-18 NOTE — PROGRESS NOTES
Family Medicine Follow-Up Progress Note  Patient: Karina Smith  1970, 47 y.o., female  Encounter Date: 8/18/2022    ASSESSMENT & PLAN    ICD-10-CM ICD-9-CM    1. Lymphedema  I89.0 457.1 Cane sharon      REFERRAL TO LYMPHEDEMA CLINIC      2. Ambulatory dysfunction  R26.2 719.7 Cane sharon      REFERRAL TO LYMPHEDEMA CLINIC      3. Encounter for screening mammogram for malignant neoplasm of breast  Z12.31 V76.12 [facility] MAMMO BI SCREENING INCL CAD      4. Prediabetes  R73.03 790.29 HEMOGLOBIN A1C WITH EAG      CBC W/O DIFF      METABOLIC PANEL, COMPREHENSIVE      MICROALBUMIN, UR, RAND W/ MICROALB/CREAT RATIO      LIPID PANEL      5. Lymphedema of left lower extremity  I89.0 457.1 REFERRAL TO LYMPHEDEMA CLINIC      6. Hypovitaminosis D  E55.9 268.9 VITAMIN D, 25 HYDROXY      7.  Left leg swelling  M79.89 729.81 furosemide (LASIX) 40 mg tablet          Orders Placed This Encounter    [facility] MAMMO BI SCREENING INCL CAD     Standing Status:   Future     Standing Expiration Date:   2/18/2023     Order Specific Question:   Reason for Exam     Answer:   screening    HEMOGLOBIN A1C WITH EAG     Standing Status:   Future     Standing Expiration Date:   8/18/2023    CBC W/O DIFF     Standing Status:   Future     Standing Expiration Date:   0/48/6302    METABOLIC PANEL, COMPREHENSIVE     Standing Status:   Future     Standing Expiration Date:   8/18/2023    MICROALBUMIN, UR, RAND W/ MICROALB/CREAT RATIO     Standing Status:   Future     Standing Expiration Date:   8/18/2023    LIPID PANEL     Standing Status:   Future     Standing Expiration Date:   8/18/2023    VITAMIN D, 25 HYDROXY     Standing Status:   Future     Standing Expiration Date:   8/18/2023    REFERRAL TO LYMPHEDEMA CLINIC     Referral Priority:   Routine     Referral Type:   Consultation     Referral Reason:   Specialty Services Required     Number of Visits Requested:   1    Cane sharon     Sig: Please provide 1 appropriately fitted cane R26.2, I89     Dispense:  1 Each     Refill:  0    furosemide (LASIX) 40 mg tablet     Sig: Take 1 Tablet by mouth daily. Dispense:  90 Tablet     Refill:  1       Patient Instructions   If no room at [facility]  [facility]  [phone] to call  Considerable left lower extremity lymphedema, has had work-up for this in the past and has had negative DVT studies  I would recommend at this point that we resume lymphedema therapy as above  I recommend mammogram  I recommend labs per my orders  I encouraged healthy habits  Please find ways to increase ambulation during her workday  Follow-up in about 4 to 6 weeks or sooner if needed  Resume Lasix    CHIEF COMPLAINT  Chief Complaint   Patient presents with    Leg Swelling     +4 eddema L leg chronic LAD and rash noted pn occasionally          SUBJECTIVE  Ladjean-pierre Salinas is a 47 y.o. 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 OBJECTIVE  Visit Vitals  BP (!) 144/86   Pulse (!) 110   Temp 97.3 °F (36.3 °C) (Temporal)   Resp 18   Ht 5' 3\" (1.6 m)   Wt 207 lb (93.9 kg)   BMI 36.67 kg/m²       Physical Exam  Vitals and nursing note reviewed. Constitutional:       General: She is not in acute distress. Appearance: Normal appearance. She is not ill-appearing, toxic-appearing or diaphoretic. HENT:      Head: Normocephalic and atraumatic. Right Ear: External ear normal.      Left Ear: External ear normal.   Eyes:      General: No scleral icterus. Right eye: No discharge. Left eye: No discharge.       Comments: eom grossly intact   Neck:      Comments: No visible neck masses , ROM appears normal from visual inspection  Cardiovascular:      Rate and Rhythm: Normal rate and regular rhythm. Heart sounds: No friction rub. No gallop. Pulmonary:      Effort: Pulmonary effort is normal. No respiratory distress. Breath sounds: No stridor. No wheezing or rhonchi. Abdominal:      General: Bowel sounds are normal.      Palpations: Abdomen is soft. Musculoskeletal:      Left lower leg: Edema present. Skin:     General: Skin is warm and dry. Comments: Left lower extremity with significant skin thickening, some blistering overlying considerable lymphedema 3-4+ but no evidence of cellulitis there is skin change consistent with stasis dermatitis   Neurological:      General: No focal deficit present. Mental Status: She is alert and oriented to person, place, and time. Psychiatric:         Mood and Affect: Mood normal.         Behavior: Behavior normal.         Thought Content: Thought content normal.         Judgment: Judgment normal.       No results found for any visits on 08/18/22. HISTORICAL  Reviewed and updated today, and as noted below:    Past Medical History:   Diagnosis Date    Anemia     Fibroids     July 2010 - embolization     Herpes genitalia     positive    Ill-defined condition     lympedema    Obesity, Class II, BMI 35-39.9     Prediabetes      Past Surgical History:   Procedure Laterality Date    COLONOSCOPY N/A 5/28/2021    COLONOSCOPY performed by Key Hargrove MD at [address] [facility] [address] per patient    HX GYN  6/2011    Uterine fibroid embolization. HX HERNIA REPAIR  01/04/2021    Robotic-assisted laparoscopic supraumbilical herniorrhaphy with mesh.     HX MYOMECTOMY      HX TONSILLECTOMY       Family History   Problem Relation Age of Onset    Diabetes Mother     Cancer Maternal Grandmother         ovary     Kidney Disease Maternal Grandmother     Cancer Other         endometrial    Cancer Other         aunt    Cancer Other     Diabetes Other     Ovarian Cancer Paternal Aunt      Social History     Tobacco Use   Smoking Status Former    Packs/day: 0.20    Years: 5.00    Pack years: 1.00    Types: Cigarettes    Quit date: 12/10/2011    Years since quitting: 10.6   Smokeless Tobacco Never   Tobacco Comments    2-3 cigarettes a week     Social History     Socioeconomic History    Marital status: SINGLE   Tobacco Use    Smoking status: Former     Packs/day: 0.20     Years: 5.00     Pack years: 1.00     Types: Cigarettes     Quit date: 12/10/2011     Years since quitting: 10.6    Smokeless tobacco: Never    Tobacco comments:     2-3 cigarettes a week   Vaping Use    Vaping Use: Never used   Substance and Sexual Activity    Alcohol use: Yes     Alcohol/week: 2.5 standard drinks     Types: 1 Glasses of wine, 1 Cans of beer, 1 Shots of liquor per week     Comment: social     Drug use: No    Sexual activity: Not Currently     Partners: Male     Birth control/protection: Condom   Social History Narrative    Works from home        Lives with daughter     No Known Allergies    LAB REVIEW  Lab Results   Component Value Date/Time    Sodium 139 04/08/2021 12:07 PM    Potassium 3.9 04/08/2021 12:07 PM    Chloride 106 04/08/2021 12:07 PM    CO2 24 04/08/2021 12:07 PM    Anion gap 9 04/08/2021 12:07 PM    Glucose 109 (H) 04/08/2021 12:07 PM    BUN 11 04/08/2021 12:07 PM    Creatinine 0.67 04/08/2021 12:07 PM    BUN/Creatinine ratio 16 04/08/2021 12:07 PM    GFR est AA >60 04/08/2021 12:07 PM    GFR est non-AA >60 04/08/2021 12:07 PM    Calcium 8.6 04/08/2021 12:07 PM    Bilirubin, total 0.5 05/25/2021 08:52 AM    Alk.  phosphatase 101 05/25/2021 08:52 AM    Protein, total 7.4 05/25/2021 08:52 AM    Albumin 3.4 (L) 05/25/2021 08:52 AM    Globulin 4.0 05/25/2021 08:52 AM    A-G Ratio 0.9 (L) 05/25/2021 08:52 AM    ALT (SGPT) 42 05/25/2021 08:52 AM     Lab Results   Component Value Date/Time    WBC 10.0 01/04/2021 09:16 AM    Hemoglobin (POC) 11.9 04/09/2015 08:30 AM    HGB 12.2 01/04/2021 09:16 AM    HCT 36.5 01/04/2021 09:16 AM    PLATELET 383 70/99/9181 09:16 AM    .6 (H) 01/04/2021 09:16 AM     Lab Results   Component Value Date/Time    Hemoglobin A1c 5.6 04/08/2021 12:07 PM     Lab Results   Component Value Date/Time    Cholesterol, total 156 04/08/2021 12:07 PM    HDL Cholesterol 80 04/08/2021 12:07 PM    LDL, calculated 50.4 04/08/2021 12:07 PM    VLDL, calculated 25.6 04/08/2021 12:07 PM    Triglyceride 128 04/08/2021 12:07 PM    CHOL/HDL Ratio 2.0 04/08/2021 12:07 PM           Gilberto Cruz MD  [facility]  08/18/22 11:08 AM    Portions of this note may have been populated using smart dictation software and may have \"sounds-like\" errors present. Pt was counseled on risks, benefits and alternatives of treatment options. All questions were asked and answered and the patient was agreeable with the treatment plan as outlined.

## 2022-08-18 NOTE — PATIENT INSTRUCTIONS
If no room at [facility]  [facility]  [phone] to call  Considerable left lower extremity lymphedema, has had work-up for this in the past and has had negative DVT studies  I would recommend at this point that we resume lymphedema therapy as above  I recommend mammogram  I recommend labs per my orders  I encouraged healthy habits  Please find ways to increase ambulation during her workday  Follow-up in about 4 to 6 weeks or sooner if needed  Resume Lasix

## 2022-08-18 NOTE — PROGRESS NOTES
Chief Complaint   Patient presents with    Leg Swelling     +4 eddema L leg chronic LAD and rash noted pn occasionally          1. \"Have you been to the ER, urgent care clinic since your last visit? Hospitalized since your last visit? \" No    2. \"Have you seen or consulted any other health care providers outside of the [address] [place] [address] since your last visit? \" No     3. For patients aged 39-70: Has the patient had a colonoscopy / FIT/ Cologuard? Yes - no Care Gap present      If the patient is female:    4. For patients aged 41-77: Has the patient had a mammogram within the past 2 years? No      5. For patients aged 21-65: Has the patient had a pap smear?  Yes - no Care Gap present

## 2022-09-23 ENCOUNTER — TRANSCRIBE ORDER (OUTPATIENT)
Dept: SCHEDULING | Age: 52
End: 2022-09-23

## 2022-09-23 DIAGNOSIS — Z12.31 SCREENING MAMMOGRAM FOR HIGH-RISK PATIENT: Primary | ICD-10-CM

## 2022-10-19 ENCOUNTER — HOSPITAL ENCOUNTER (OUTPATIENT)
Dept: MAMMOGRAPHY | Age: 52
Discharge: HOME | End: 2022-10-19
Attending: FAMILY MEDICINE
Payer: COMMERCIAL

## 2022-10-19 DIAGNOSIS — Z12.31 SCREENING MAMMOGRAM FOR HIGH-RISK PATIENT: ICD-10-CM

## 2022-10-19 PROCEDURE — 77063 BREAST TOMOSYNTHESIS BI: CPT

## 2022-10-28 ENCOUNTER — TELEPHONE (OUTPATIENT)
Dept: FAMILY MEDICINE CLINIC | Age: 52
End: 2022-10-28

## 2022-10-28 NOTE — TELEPHONE ENCOUNTER
Pt called requesting in person appointment with Dr. Shivani Leblanc for painful rash under left breast with oozing of fluid and notes odor. Pt denies itching, states she just had normal mammogram, but is concerned about shingles, yeast, or even HIV from looking up her symptoms online. No appointments available today and pt agrees to seek urgent care, but wants Dr. Shivani Leblanc to send vitamin D recommended in her Mychart to pharmacy as prescription for insurance to cover it.  Lee

## 2022-10-28 NOTE — TELEPHONE ENCOUNTER
1) I recommended otc vit d , not rx    2) labs will be ordered at time of visit    3) recommend she seek care, can she see Dr Mak Dunbar?

## 2022-10-31 ENCOUNTER — OFFICE VISIT (OUTPATIENT)
Dept: FAMILY MEDICINE CLINIC | Age: 52
End: 2022-10-31
Payer: COMMERCIAL

## 2022-10-31 VITALS
WEIGHT: 231 LBS | BODY MASS INDEX: 40.93 KG/M2 | HEART RATE: 101 BPM | SYSTOLIC BLOOD PRESSURE: 138 MMHG | RESPIRATION RATE: 20 BRPM | TEMPERATURE: 98.1 F | HEIGHT: 63 IN | DIASTOLIC BLOOD PRESSURE: 85 MMHG

## 2022-10-31 DIAGNOSIS — Z11.4 ENCOUNTER FOR SCREENING FOR HIV: ICD-10-CM

## 2022-10-31 DIAGNOSIS — B37.2 CANDIDAL INTERTRIGO: Primary | ICD-10-CM

## 2022-10-31 PROBLEM — L81.4 MELANOSIS: Status: ACTIVE | Noted: 2022-10-31

## 2022-10-31 PROBLEM — H40.023 OPEN ANGLE WITH BORDERLINE FINDINGS AND HIGH GLAUCOMA RISK IN BOTH EYES: Status: ACTIVE | Noted: 2019-10-21

## 2022-10-31 PROCEDURE — 99213 OFFICE O/P EST LOW 20 MIN: CPT | Performed by: FAMILY MEDICINE

## 2022-10-31 RX ORDER — NYSTATIN 100000 [USP'U]/G
POWDER TOPICAL 4 TIMES DAILY
Qty: 60 G | Refills: 0 | Status: SHIPPED | OUTPATIENT
Start: 2022-10-31

## 2022-10-31 NOTE — TELEPHONE ENCOUNTER
Called pt, and she has been advised and states understanding of recommendation and agrees with plan. Pt has been scheduled for today for evaluation.

## 2022-10-31 NOTE — PROGRESS NOTES
Chief Complaint   Patient presents with    Rash     rash under left breast with oozing of fluid and notes odor x 4 days

## 2022-10-31 NOTE — ASSESSMENT & PLAN NOTE
Patient's rash is consistent with a bad case of candidal intertrigo with dermatitis. No signs of vesicle formation or dermatome distribution that would suggest herpes zoster infection. No signs of cellulitis process or secondary bacterial infection at this time, though if not taken care of may soon develop in that direction.  -Recommended nystatin powder treatments 4 times per day  - continue good hygiene practices with gentle non-antibacterial product such as Dove. - emphasize good skin drying after showers, and throughout day if tendency for moisture buildup.  - return precautions for signs of cellulitis (spreading redness/swelling/pain) or worsening despite treatment. - provided education on link between elevated blood glucose and risk for intertrigo; patient motivated to work on diabetes/weight for this.

## 2022-10-31 NOTE — PROGRESS NOTES
Michael Moreau (: 1970) is a 47 y.o. female, established patient, here for evaluation of the following chief complaint(s):  Rash (rash under left breast with oozing of fluid and notes odor x 4 days )       ASSESSMENT/PLAN:  Below is the assessment and plan developed based on review of pertinent history, physical exam, labs, studies, and medications. 1. Candidal intertrigo  Assessment & Plan:  Patient's rash is consistent with a bad case of candidal intertrigo with dermatitis. No signs of vesicle formation or dermatome distribution that would suggest herpes zoster infection. No signs of cellulitis process or secondary bacterial infection at this time, though if not taken care of may soon develop in that direction.  -Recommended nystatin powder treatments 4 times per day  - continue good hygiene practices with gentle non-antibacterial product such as Dove. - emphasize good skin drying after showers, and throughout day if tendency for moisture buildup.  - return precautions for signs of cellulitis (spreading redness/swelling/pain) or worsening despite treatment. - provided education on link between elevated blood glucose and risk for intertrigo; patient motivated to work on diabetes/weight for this. 2. Encounter for screening for HIV  -     HIV 1/2 AG/AB, 4TH GENERATION,W RFLX CONFIRM; Future      No follow-ups on file. SUBJECTIVE/OBJECTIVE:  HPI  Chronic Conditions Addressed Today       1. Candidal intertrigo - Primary     Overview      Redness and itching under L breast x 4 days, tried treating with antibacterial soap which did not help and may have worsened thereafter. Now tender to touch and weepy. Notes good hygiene practices of daily showers and washing in skin folds, uses the antibacterial soap daily. Denies fevers/chills, discrete masses, no hx of zoster, no vesicles or blistering or preceding pain/numbness to skin area.           Current Assessment & Plan      Patient's rash is consistent with a bad case of candidal intertrigo with dermatitis. No signs of vesicle formation or dermatome distribution that would suggest herpes zoster infection. No signs of cellulitis process or secondary bacterial infection at this time, though if not taken care of may soon develop in that direction.  -Recommended nystatin powder treatments 4 times per day  - continue good hygiene practices with gentle non-antibacterial product such as Dove. - emphasize good skin drying after showers, and throughout day if tendency for moisture buildup.  - return precautions for signs of cellulitis (spreading redness/swelling/pain) or worsening despite treatment. - provided education on link between elevated blood glucose and risk for intertrigo; patient motivated to work on diabetes/weight for this. Relevant Medications     nystatin (MYCOSTATIN) powder     Acute Diagnoses Addressed Today       Encounter for screening for HIV            Relevant Orders        HIV 1/2 AG/AB, 4TH GENERATION,W RFLX CONFIRM              Review of Systems  As per HPI. Physical Exam  Constitutional: well appearing, no acute distress  Skin: raw appearing, red, shiny intertrigenous skin of L breast with musty, sour malodorous quality as described by both myself and patient. No vesicles, no induration or fluctuance. Approximate area 40cm sq. An electronic signature was used to authenticate this note.   -- Malachi Sullivan MD

## 2023-01-10 ENCOUNTER — TELEPHONE (OUTPATIENT)
Dept: FAMILY MEDICINE CLINIC | Age: 53
End: 2023-01-10

## 2023-03-08 ENCOUNTER — TELEPHONE (OUTPATIENT)
Dept: FAMILY MEDICINE CLINIC | Age: 53
End: 2023-03-08

## 2023-03-08 NOTE — TELEPHONE ENCOUNTER
[employer] should have faxed over ppwrk to change pt from pull ups to protective pads.  She was checking on the status of request.     Coy Owens [phone]

## 2023-03-16 NOTE — TELEPHONE ENCOUNTER
[employer] called and said they did not receive the returned fax. I looked for it and could not find it.  Karthik Arboleda

## 2023-05-01 NOTE — PROGRESS NOTES
Eva Lemus is a 47 y.o. female presents for a problem visit. Chief Complaint   Patient presents with    Vaginal Discharge     Patient's last menstrual period was 03/11/2023 (exact date). Birth Control: condoms always. Last Pap: 1/15/2020; NILM; HPV-    The patient is reporting having: Vaginal Discharge for 1  month  She reports the symptoms are is unchanged. Examination chaperoned by Lm Zhang LPN.

## 2023-05-02 ENCOUNTER — OFFICE VISIT (OUTPATIENT)
Dept: FAMILY MEDICINE CLINIC | Age: 53
End: 2023-05-02
Payer: COMMERCIAL

## 2023-05-02 ENCOUNTER — OFFICE VISIT (OUTPATIENT)
Dept: OBGYN CLINIC | Age: 53
End: 2023-05-02

## 2023-05-02 VITALS
BODY MASS INDEX: 39.69 KG/M2 | OXYGEN SATURATION: 99 % | SYSTOLIC BLOOD PRESSURE: 145 MMHG | HEIGHT: 63 IN | TEMPERATURE: 97.5 F | WEIGHT: 224 LBS | HEART RATE: 105 BPM | DIASTOLIC BLOOD PRESSURE: 81 MMHG

## 2023-05-02 VITALS
SYSTOLIC BLOOD PRESSURE: 135 MMHG | DIASTOLIC BLOOD PRESSURE: 86 MMHG | BODY MASS INDEX: 39.73 KG/M2 | HEIGHT: 63 IN | WEIGHT: 224.2 LBS

## 2023-05-02 DIAGNOSIS — N89.8 VAGINAL DISCHARGE: Primary | ICD-10-CM

## 2023-05-02 DIAGNOSIS — I89.0 LYMPHEDEMA OF LEFT LOWER EXTREMITY: Primary | ICD-10-CM

## 2023-05-02 PROCEDURE — 99212 OFFICE O/P EST SF 10 MIN: CPT | Performed by: OBSTETRICS & GYNECOLOGY

## 2023-05-02 PROCEDURE — 99214 OFFICE O/P EST MOD 30 MIN: CPT | Performed by: FAMILY MEDICINE

## 2023-05-02 RX ORDER — AMOXICILLIN 500 MG/1
500 CAPSULE ORAL 3 TIMES DAILY
COMMUNITY
End: 2023-05-03

## 2023-05-03 ENCOUNTER — TELEPHONE (OUTPATIENT)
Dept: FAMILY MEDICINE CLINIC | Age: 53
End: 2023-05-03

## 2023-05-04 RX ORDER — KETOCONAZOLE 20 MG/G
CREAM TOPICAL 2 TIMES DAILY
Qty: 15 G | Refills: 0 | Status: SHIPPED | OUTPATIENT
Start: 2023-05-04 | End: 2023-05-11

## 2023-05-05 LAB
A VAGINAE DNA VAG QL NAA+PROBE: NORMAL SCORE
BVAB2 DNA VAG QL NAA+PROBE: NORMAL SCORE
C ALBICANS DNA VAG QL NAA+PROBE: NEGATIVE
C GLABRATA DNA VAG QL NAA+PROBE: NEGATIVE
MEGA1 DNA VAG QL NAA+PROBE: NORMAL SCORE
T VAGINALIS DNA VAG QL NAA+PROBE: NEGATIVE

## 2023-05-24 NOTE — PROGRESS NOTES
Forrest Estrella is a 47 y.o. female returns for an annual exam     No chief complaint on file. No LMP recorded. Her periods are light in flow and often irregular with no apparent pattern. She does not have dysmenorrhea. Problems: no problems  Birth Control: condoms always. Last Pap: 1/15/2020; NILM; HPV-  She does not have a history of PRINCESS 2, 3 or cervical cancer. Last Mammogram: 10/19/2022; negative  Last colonoscopy: 5/28/2021; Normal    Examination chaperoned by Delma Royal LPN.

## 2023-05-25 ENCOUNTER — OFFICE VISIT (OUTPATIENT)
Age: 53
End: 2023-05-25
Payer: COMMERCIAL

## 2023-05-25 VITALS — SYSTOLIC BLOOD PRESSURE: 122 MMHG | DIASTOLIC BLOOD PRESSURE: 81 MMHG | BODY MASS INDEX: 41.06 KG/M2 | WEIGHT: 231.8 LBS

## 2023-05-25 DIAGNOSIS — Z11.51 SCREENING FOR HPV (HUMAN PAPILLOMAVIRUS): ICD-10-CM

## 2023-05-25 DIAGNOSIS — Z01.419 ENCOUNTER FOR GYNECOLOGICAL EXAMINATION: Primary | ICD-10-CM

## 2023-05-25 PROCEDURE — 99396 PREV VISIT EST AGE 40-64: CPT | Performed by: OBSTETRICS & GYNECOLOGY

## 2023-05-25 RX ORDER — AMOXICILLIN 500 MG/1
500 CAPSULE ORAL 3 TIMES DAILY
COMMUNITY

## 2023-05-25 NOTE — PROGRESS NOTES
Josette Melchor is a No obstetric history on file. ,  47 y.o. female Black / Aylin Nilay whose LMP was on 3/11/2023 who presents for her annual checkup. She is having no problems. Menstrual status:    Her periods are light in flow, irregular    She denies dysmenorrhea. Pt is postmenopausal    The patient is not using HRT. Contraception:    The current method of family planning is condoms always. Sexual history:    She  reports being sexually active and has had partner(s) who are male. She reports using the following method of birth control/protection: Condom. Pap and Mammogram History:    Last Pap: 1/15/2020; NILM; HPV-  She does not have a history of PRINCESS 2, 3 or cervical cancer. Last Mammogram: 10/19/2022; negative  Last colonoscopy: 5/28/2021; Normal      Breast Cancer History    She has no family history of breast cancer. Family History   Problem Relation Age of Onset    Ovarian Cancer Paternal Aunt     Diabetes Other     Cancer Other     Cancer Other         aunt    Cancer Other         endometrial    Kidney Disease Maternal Grandmother     Diabetes Mother     Cancer Maternal Grandmother         ovary        Past Medical History:   Diagnosis Date    Anemia     Fibroids     July 2010 - embolization     Herpes genitalia     positive    Ill-defined condition     lympedema    Obesity, Class II, BMI 35-39.9     Prediabetes      Past Surgical History:   Procedure Laterality Date    COLONOSCOPY N/A 5/28/2021    COLONOSCOPY performed by Carine Glez MD at [address] [place] [address] per patient    GYN  6/2011    Uterine fibroid embolization. HERNIA REPAIR  01/04/2021    Robotic-assisted laparoscopic supraumbilical herniorrhaphy with mesh.     MYOMECTOMY      TONSILLECTOMY       Current Outpatient Medications   Medication Sig Dispense Refill    amoxicillin (AMOXIL) 500 MG capsule Take 1 capsule by mouth 3 times daily      ferrous sulfate (IRON 325) 325 (65 Fe) MG tablet

## 2023-06-06 LAB
CYTOLOGIST CVX/VAG CYTO: ABNORMAL
CYTOLOGY CVX/VAG DOC CYTO: ABNORMAL
CYTOLOGY CVX/VAG DOC THIN PREP: ABNORMAL
DX ICD CODE: ABNORMAL
DX ICD CODE: ABNORMAL
HPV GENOTYPE REFLEX: ABNORMAL
HPV I/H RISK 4 DNA CVX QL PROBE+SIG AMP: NEGATIVE
Lab: ABNORMAL
Lab: ABNORMAL
OTHER STN SPEC: ABNORMAL
PATHOLOGIST CVX/VAG CYTO: ABNORMAL
STAT OF ADQ CVX/VAG CYTO-IMP: ABNORMAL

## 2023-06-08 ENCOUNTER — TELEPHONE (OUTPATIENT)
Age: 53
End: 2023-06-08

## 2023-06-08 NOTE — TELEPHONE ENCOUNTER
Pt last seen in office 5/25/23 calling to get pap smear results. Advised pt per MD, Pap shows benign cellular changes. HPV is negative. This is considered a NORMAL pap. See you in a year. Pap will be repeated in 3 years. Pt verbalized understanding.

## 2023-07-17 ENCOUNTER — HOSPITAL ENCOUNTER (OUTPATIENT)
Facility: HOSPITAL | Age: 53
Setting detail: RECURRING SERIES
Discharge: HOME | End: 2023-07-20
Payer: COMMERCIAL

## 2023-07-17 PROCEDURE — 97140 MANUAL THERAPY 1/> REGIONS: CPT

## 2023-07-17 PROCEDURE — 97162 PT EVAL MOD COMPLEX 30 MIN: CPT

## 2023-07-17 NOTE — THERAPY EVALUATION
Statement of Medical Necessity    Page 1 of 2      [facility] 1970 Today's Date: 7/17/2023 NAHID: Lifetime   Payor: [employer] / Plan: [employer] / Product Type: *No Product type* /  ME: TBD  Refills: 2               Diagnosis  []   I97.2 Post-Mastectomy Lymphedema []   I87.2 Venous Insufficiency   []   I89.0 Lymphedema, other secondary  []   I83.019 Venous Stasis Ulcer LE, Right   []   I89.9 Unspecified Lymphatic Disorder []   I83.029 Venous Stasis Ulcer LE, Left   [x]   R60.9 Swelling not relieved by elevation [x]   Q82.0 Hereditary/ Congenital Lymphedema   []   C50.211 Malignant neoplasm of breast, Right []   G89.3  Cancer associated pain   []   C50.212 Malignant neoplasm of breast, Left []   L03.115 LE Cellulitis, Right   []    []   L03.116 LE Cellulitis, Left                                                           [facility]    1970  Page 2 of 2    Physician Order for DME for Diagnosis of Q82.0 as Listed on Statement of Medical Necessity, Page 1        Recommended Product:  Units Upper Extremity Rt Lt Units Lower Extremity Rt Lt    Circ-Aid, Ready Wrap, Sigvaris Arm   4 Inelastic binders (Michael Sanabria)  [x]Foot   [x]Below Knee   []Knee   []Thigh x x    Circ-Aid Ready Wrap, Sigvaris hand    Ruben Javed, night use []Full Leg  []Lower Leg      Tribute Arm, night use   2 Tribute, night use  [x]Full Leg  [x]Lower Leg x x    Ruben Javed Arm, night use   1 Andrew Sleeve Leg/ Foot, night use  x    Gradiant Compression Sleeves & Gloves  []Custom [] RM Arm:  []CCL1 []CCL2 []CCL3  []Custom [] RM Glove: []CCL1 []CCL2 []CCL3     4 Gradient Compression Stockings   [x]Custom  []RM Lower Extremity:   []CCL1       [x]CCL2         []CCL3   [x]Knee       [x]Thigh        []Waist Length      Andrew sleeve arm w/ hand, night use    Tribute Wrap, night use      Compression Bra          Compression Vest         The above patient was referred for treatment of Lymphedema due to the diagnosis

## 2023-07-17 NOTE — THERAPY EVALUATION
Kelly   a part of [address] [place] [address]  [address] [facility], [address] [place] [address]   Harmeet Davila                                                    ZAJ:[phone]   GTR:[phone]                                                                                 PHYSICAL THERAPY LYMPHEDEMA - EVALUATION/PLAN OF CARE NOTE (updated 3/23)      Date: 2023          Patient Name:  Rony Harrington :  1970   Medical   Diagnosis:  Lymphedema, not elsewhere classified [I89.0] Treatment Diagnosis:  Q82.0     Hereditary lymphedema and R60.9     Edema, unspecified    Referral Source:  Jeannette Boles MD Provider #:  [id number]                Insurance: Payor: [facility] / Plan: [facility] / Product Type: *No Product type* /      Patient  verified yes     Visit #   Current  / Total 1    Time   In / Out 0945 1048   Total Treatment Time 63   Total Timed Codes 33         SUBJECTIVE  Pain Level (0-10 scale): 6/10, L LE heaviness, skin tightness, tenderness to touch lower leg  [x]constant []intermittent []improving []worsening []no change since onset    Any medication changes, allergies to medications, adverse drug reactions, diagnosis change, or new procedure performed?: [x] No    [] Yes (see summary sheet for update)  Medications: Verified on Patient Summary List    Subjective functional status/changes:     Patient reports she was managing L LE lymphedema with use of Flexitouch pump use daily when daughter was home to assist her getting in and out of pump. When daughter returned to college, patient states she was unable to get in and out of pump without assistance. Patient states L LE have become more severe, with skin changes, progression of fungal growth which she reports she is treating with cream prescribed by PCP.   Patient states she is serious about pursing treatment of LE lymphedema, as she is concerned about progression of L LE

## 2023-07-19 ENCOUNTER — APPOINTMENT (OUTPATIENT)
Facility: HOSPITAL | Age: 53
End: 2023-07-19
Payer: COMMERCIAL

## 2023-07-21 ENCOUNTER — HOSPITAL ENCOUNTER (OUTPATIENT)
Facility: HOSPITAL | Age: 53
Setting detail: RECURRING SERIES
Discharge: HOME | End: 2023-07-24
Payer: COMMERCIAL

## 2023-07-21 PROCEDURE — 97140 MANUAL THERAPY 1/> REGIONS: CPT

## 2023-07-21 NOTE — PROGRESS NOTES
PHYSICAL THERAPY/OCCUPATIONAL THERAPY - DAILY TREATMENT NOTE (updated 3/23)      Date: 2023          Patient Name:  Rony Harrington :  1970   Medical   Diagnosis:  Lymphedema, not elsewhere classified [I89.0] Treatment Diagnosis:  Q82.0     Hereditary lymphedema and R60.9     Edema, unspecified    Referral Source:  Jeannette Boles MD Insurance:   Payor: [facility] / Plan: [facility] / Product Type: *No Product type* /                     Patient  verified yes     Visit #   Current  / Total 2 Submitted   Time   In / Out 0835 0983   Total Treatment Time 62   Total Timed Codes 62         SUBJECTIVE    Pain Level (0-10 scale): 6/10, tightness, heaviness    Any medication changes, allergies to medications, adverse drug reactions, diagnosis change, or new procedure performed?: [x] No    [] Yes (see summary sheet for update)  Medications: Verified on Patient Summary List    Subjective functional status/changes:     Patient arrives with bandaging supplies from when previously treated. States she is agreeable to buy more supplies, as bilateral LE MLB indicated. Patient agreeable to proceeding with treatment. OBJECTIVE      Therapeutic Procedures: Tx Min Billable or 1:1 Min (if diff from Tx Min) Procedure, Rationale, Specifics   62  36454 Manual Therapy (timed):  decrease pain, increase tissue extensibility, and decrease edema to improve patient's ability to progress to PLOF and address remaining functional goals. The manual therapy interventions were performed at a separate and distinct time from the therapeutic activities interventions . (see flow sheet as applicable)    Details if applicable:  Manual Lymphatic Drainage (MLD):  Area to decongest: LE Bilateral   Sequence used and effectiveness: Secondary sequence for lower extremities with trunk involvement. Full lL LE sequence performed. Patient tolerated treatment well.    Skin/wound care/debridement: Reviewed skin

## 2023-07-24 ENCOUNTER — HOSPITAL ENCOUNTER (OUTPATIENT)
Facility: HOSPITAL | Age: 53
Setting detail: RECURRING SERIES
Discharge: HOME | End: 2023-07-27
Payer: COMMERCIAL

## 2023-07-24 PROCEDURE — 97535 SELF CARE MNGMENT TRAINING: CPT

## 2023-07-24 PROCEDURE — 97140 MANUAL THERAPY 1/> REGIONS: CPT

## 2023-07-24 NOTE — PROGRESS NOTES
with application of multi-layer bandaging for continued volume reduction and prevention of re-accumulation of fluid during maintenance  phase of CDT within 12 weeks. PLAN  Yes  Continue plan of care  Re-Cert Due: 76/20/1826  []  Upgrade activities as tolerated  []  Discharge due to :  [x]  Other: 1. Recheck MLB 2. Patient to order 2nd set bandages 3.  Consider chip/kidney foam at ankle      Amy Lombardo PT,DPT, CLT-DELANEY    7/24/2023       9:21 AM

## 2023-07-26 ENCOUNTER — HOSPITAL ENCOUNTER (OUTPATIENT)
Facility: HOSPITAL | Age: 53
Setting detail: RECURRING SERIES
Discharge: HOME | End: 2023-07-29
Payer: COMMERCIAL

## 2023-07-26 PROCEDURE — 97140 MANUAL THERAPY 1/> REGIONS: CPT

## 2023-07-26 NOTE — PROGRESS NOTES
compression bandaging will be continued until garments arrive for fitting. This process can take several weeks. 55     Total Total       Skin assessment post-treatment (if applicable):    [x]  intact    []  redness- no adverse reaction                 []redness - adverse reaction:          Patient Education: [x] Review HEP    [x]  Patient Education billed concurrently with other procedures   [x] MLD Patient Education Continued education in self MLD technique with bathing and skin care  [] Progressed/Changed HEP based on:   [] positioning   [] Kinesiotape   [x] Skin care   [] wound care   [] other:   [x] Precautions., Supply ordering and types of bandaging. , and Patient agreeable to purchase additional supplies for MLB. Patient / caregiver re-demonstrated bandaging. [] Yes  [x] No  Compression bandaging/garment precautions reviewed: [x] Yes  [] No      Other Objective/Functional Measures      Pain Level at end of session (0-10 scale): 5/10      Assessment   Patient with good tolerance to MLD/MLB. Skin changes persist with papillomas and abnormal shaping danna ankle and dorsal foot may benefit from chip bag or more dense foam. Trial with Komprex 2 on dorsal foot today. Patient reluctant to purchase bandages OOP due to financial constraints and wishing to go through her insurance company. PT asking patient to obtain at least 1 set of bandages through  and attempt reimbursement or contact DME providers that insurance company provided to obtain bandages. Therapist pulled one set of knee high bandages today. Patient indicates understanding. Patient advised regarding conditions under which to remove bandaging supplies. Patient advised to launder all supplies prior to next treatment. Will progress to bilateral MLB once pt obtains additional bandaging supplies. Patient verbalized agreement with instructions provided.     Patient will continue to benefit from skilled PT / OT services to address swelling,

## 2023-07-31 ENCOUNTER — HOSPITAL ENCOUNTER (OUTPATIENT)
Facility: HOSPITAL | Age: 53
Setting detail: RECURRING SERIES
Discharge: HOME | End: 2023-08-03
Payer: COMMERCIAL

## 2023-07-31 PROCEDURE — 97535 SELF CARE MNGMENT TRAINING: CPT

## 2023-07-31 PROCEDURE — 97140 MANUAL THERAPY 1/> REGIONS: CPT

## 2023-07-31 NOTE — PROGRESS NOTES
PHYSICAL THERAPY/OCCUPATIONAL THERAPY - DAILY TREATMENT NOTE (updated 3/23)      Date: 2023          Patient Name:  Gale Rice :  1970   Medical   Diagnosis:  Lymphedema, not elsewhere classified [I89.0] Treatment Diagnosis:  Q82.0     Hereditary lymphedema and R60.9     Edema, unspecified    Referral Source:  Jarocho Velásquez MD Insurance:   Payor: [facility] / Plan: [facility] / Product Type: *No Product type* /                     Patient  verified yes     Visit #   Current  / Total 5 24   Time   In / Out 1125 1225   Total Treatment Time 60   Total Timed Codes 60         SUBJECTIVE    Pain Level (0-10 scale): 5/10, tightness, heaviness, itching L LE    Any medication changes, allergies to medications, adverse drug reactions, diagnosis change, or new procedure performed?: [x] No    [] Yes (see summary sheet for update)  Medications: Verified on Patient Summary List    Subjective functional status/changes:       Patient asking for PT to fax bandage order list to \"At Home delivery vendor\" who was provided to her via insurance company. Patient states she forgot her bandages at home today and arrives without supplies. Patient asking if she needs to reschedule. PT suggesting to reuse current bandages if they are in fair condition. Patient agreeable to proceeding with treatment. OBJECTIVE      Therapeutic Procedures: Tx Min Billable or 1:1 Min (if diff from Tx Min) Procedure, Rationale, Specifics   10 10 61731 Self Care/Home Management (timed):  improve patient knowledge and understanding of pain reducing techniques, positioning, home safety, activity modification, diagnosis/prognosis, and physical therapy expectations, procedures and progression  to improve patient's ability to progress to PLOF and address remaining functional goals. (see flow sheet as applicable)  Skin care routine for R LE during L MLB phase.  To wash daily with gentle wash cloth no

## 2023-08-01 ENCOUNTER — TELEPHONE (OUTPATIENT)
Facility: CLINIC | Age: 53
End: 2023-08-01

## 2023-08-01 NOTE — TELEPHONE ENCOUNTER
Gave verbal.  They had to change the name brand due to not in stock.   Will be faxing order over today

## 2023-08-01 NOTE — TELEPHONE ENCOUNTER
Navi Philippe with [address] is asking if sign of on order for compression wraps that the pt wants to use for the wounds on her legs. Can use a verbal ok to ship out today also faxing order to be signed.      Navi Philippe [phone]

## 2023-08-02 ENCOUNTER — HOSPITAL ENCOUNTER (OUTPATIENT)
Facility: HOSPITAL | Age: 53
Setting detail: RECURRING SERIES
Discharge: HOME | End: 2023-08-05
Payer: COMMERCIAL

## 2023-08-02 PROCEDURE — 97140 MANUAL THERAPY 1/> REGIONS: CPT

## 2023-08-02 NOTE — PROGRESS NOTES
PHYSICAL THERAPY/OCCUPATIONAL THERAPY - DAILY TREATMENT NOTE (updated 3/23)      Date: 2023          Patient Name:  Terell Irwin :  1970   Medical   Diagnosis:  Lymphedema, not elsewhere classified [I89.0] Treatment Diagnosis:  Q82.0     Hereditary lymphedema and R60.9     Edema, unspecified    Referral Source:  Rossi Marion MD Insurance:   Payor: [facility] / Plan: [facility] / Product Type: *No Product type* /                     Patient  verified yes     Visit #   Current  / Total 5 24   Time   In / Out 1105 1216   Total Treatment Time 71   Total Timed Codes 71     Progress note due 8/15/2023    SUBJECTIVE    Pain Level (0-10 scale): 4/10, tightness, heaviness, itching L LE    Any medication changes, allergies to medications, adverse drug reactions, diagnosis change, or new procedure performed?: [x] No    [] Yes (see summary sheet for update)  Medications: Verified on Patient Summary List    Subjective functional status/changes:       Patient asking for PT to fax bandage order list to \"[employer] delivery vendor\" who was provided to her via insurance company. Patient arrives with clean supplies for bandaging L LE. Patient agreeable to proceeding with treatment. OBJECTIVE      Therapeutic Procedures: Tx Min Billable or 1:1 Min (if diff from Tx Min) Procedure, Rationale, Specifics                     NP  23069 Self Care/Home Management (timed):  improve patient knowledge and understanding of pain reducing techniques, positioning, home safety, activity modification, diagnosis/prognosis, and physical therapy expectations, procedures and progression  to improve patient's ability to progress to PLOF and address remaining functional goals. (see flow sheet as applicable)  Skin care routine for R LE during L MLB phase. To wash daily with gentle wash cloth no vigorous scrubbing. To apply Lac Hydrin lotion to dry scaling skin areas and regular lotion to all others.

## 2023-08-07 ENCOUNTER — TELEPHONE (OUTPATIENT)
Facility: CLINIC | Age: 53
End: 2023-08-07

## 2023-08-07 NOTE — TELEPHONE ENCOUNTER
Pt called requesting refill on ketoconazole prescribed by Dr. Eli Luis for rash on legs, states her physical therapist has been applying it to her legs prior to wrapping, and is asking if larger quantity than 15 gram tube can be ordered. Pt would like refill sent to her [address] on file.  Ldm      Approved Medication Requests      ketoconazole 2 % Topical 2 TIMES DAILY

## 2023-08-08 ENCOUNTER — HOSPITAL ENCOUNTER (OUTPATIENT)
Facility: HOSPITAL | Age: 53
Setting detail: RECURRING SERIES
Discharge: HOME | End: 2023-08-11
Payer: COMMERCIAL

## 2023-08-08 PROCEDURE — 97760 ORTHOTIC MGMT&TRAING 1ST ENC: CPT

## 2023-08-08 PROCEDURE — 97140 MANUAL THERAPY 1/> REGIONS: CPT

## 2023-08-08 NOTE — PROGRESS NOTES
PHYSICAL THERAPY/OCCUPATIONAL THERAPY - DAILY TREATMENT NOTE (updated 3/23)      Date: 2023          Patient Name:  Susy Hodges :  1970   Medical   Diagnosis:  Lymphedema, not elsewhere classified [I89.0] Treatment Diagnosis:  Q82.0     Hereditary lymphedema and R60.9     Edema, unspecified    Referral Source:  Nori Figueroa MD Insurance:   Payor: [facility] / Plan: [facility] / Product Type: *No Product type* /                     Patient  verified yes     Visit #   Current  / Total 6 24   Time   In / Out 0837 933   Total Treatment Time 56   Total Timed Codes 56     Progress note due 8/15/2023    SUBJECTIVE    Pain Level (0-10 scale): 4/10, tightness, heaviness, itching L LE    Any medication changes, allergies to medications, adverse drug reactions, diagnosis change, or new procedure performed?: [x] No    [] Yes (see summary sheet for update)  Medications: Verified on Patient Summary List    Subjective functional status/changes:     Patient arrives with MLB in place as applied previous treatment, reporting tolerating well. Patient arrives with clean supplies for bandaging L LE. Advised Profore wraps/FourFlex compression systems should be sent to her per Enmanuel Macon with Home care supplies. Patient agreeable to proceeding with treatment. OBJECTIVE      Therapeutic Procedures: Tx Min Billable or 1:1 Min (if diff from Tx Min) Procedure, Rationale, Specifics   26  22693 Orthotic Management and Training LE (timed): improve positioning of lower extremity during weight bearing and gait, improve pressure distrubution of the plantar aspect of the foot to improve patient's ability to progress to PLOF and address remaining functional goals.     Details if applicable:   Upper/Lower Extremity Compression: Measured for the following compression products:    LE Left Knee high, Night Garment, and Other toe cap    Style: Flat knit and Foam based    Brand: [employer] and [employer]    Type:

## 2023-08-08 NOTE — TELEPHONE ENCOUNTER
Called and spoke with pt. Per pt she has been going to the lymphedema center and having her legs wrapped. Each time she has her legs wrapped, the requested cream has been applied. Advised pt per Dr. Yusuf Nieves, this cream is not for long term use. Pt states she will advise Rachel, her therapist.     Pt has been advised if a cream is still needed to call back and schedule an appt for follow up. Pt states understanding.

## 2023-08-08 NOTE — TELEPHONE ENCOUNTER
MD Momo Perea [facility] [place] Clinical Staff 13 hours ago (6:38 PM)     AG  Need more information; is it actually helping, and if so with what? It is not normal to regularly apply ketoconazole. I may have used it for some rashes on her legs but at this point that should have resolved. May end up needing repeat appointment, or else education on reason for cream (antifungal) and see if she could benefit from just a regular moisturizer instead.

## 2023-08-09 ENCOUNTER — TELEPHONE (OUTPATIENT)
Facility: CLINIC | Age: 53
End: 2023-08-09

## 2023-08-11 ENCOUNTER — APPOINTMENT (OUTPATIENT)
Facility: HOSPITAL | Age: 53
End: 2023-08-11
Payer: COMMERCIAL

## 2023-08-14 ENCOUNTER — HOSPITAL ENCOUNTER (OUTPATIENT)
Facility: HOSPITAL | Age: 53
Setting detail: RECURRING SERIES
Discharge: HOME | End: 2023-08-17
Payer: COMMERCIAL

## 2023-08-14 PROCEDURE — 97140 MANUAL THERAPY 1/> REGIONS: CPT

## 2023-08-14 PROCEDURE — 97166 OT EVAL MOD COMPLEX 45 MIN: CPT

## 2023-08-14 NOTE — THERAPY EVALUATION
Outcome Measure:  Lymphedema Life Impact Scale: 44/68; 65% impairment         OBJECTIVE    Patient arrives with shoes that accommodate lymphedema, foot involvement. Current or Previous Compression Garment(s):  none                       Compression Pump:  Flexitouch pump     Skin Integrity and Tissue Assessment  Dermal Status: Tenuous, Dry, and Flaky  Texture/Consistency:  Boggy, Brawny, Fibrotic/Woody, and Pitting Edema R dorsal foot, L proximal lower leg  Sensation:  intact to light touch  Pigmentation/Color Change: Hyperpigmented and Hyperlipodermatosclerosis  Circulatory: Pulses dorsal pedal, posterior tibialis located with doppler bilateral LE  Nails: Fungus  Stemmers Sign: positive     Height: 5'3\"  Weight: 232 lb  BMI: 41.1  (36 or greater: adversely affecting lymphedema)     ROM: bilateral LE grossly WFL AROM  MMT: bilateral LE grossly 5/5 with MMT      Volumetric Measurements:      Date:  Right Left % difference    8/2/2023 90441.33 96565.42 -2.4   7/17/2023 21455.33 20684.92 5.06         15 min [x]Eval - untimed                        Therapeutic Procedures: Tx Min Billable or 1:1 Min (if diff from Tx Min) Procedure, Rationale, Specifics   45 95 69323 Manual Therapy (timed):  decrease pain, increase ROM, increase tissue extensibility, and decrease edema to improve patient's ability to progress to PLOF and address remaining functional goals. The manual therapy interventions were performed at a separate and distinct time from the therapeutic activities interventions . (see flow sheet as applicable)    Details if applicable:    Details if applicable:  Manual Lymphatic Drainage (MLD):  Area to decongest: LE Bilateral   Sequence used and effectiveness:  Secondary sequence for lower extremities with trunk involvement. Diaphragmatic breathing x 10; L Axillary lnn; Open IA; L LE full LE sequence performed. Patient tolerated treatment well.  Deferred RLE MLD today per Pt request d/t needing to leave

## 2023-08-16 ENCOUNTER — APPOINTMENT (OUTPATIENT)
Facility: HOSPITAL | Age: 53
End: 2023-08-16
Payer: COMMERCIAL

## 2023-08-18 ENCOUNTER — APPOINTMENT (OUTPATIENT)
Facility: HOSPITAL | Age: 53
End: 2023-08-18
Payer: COMMERCIAL

## 2023-08-21 ENCOUNTER — APPOINTMENT (OUTPATIENT)
Facility: HOSPITAL | Age: 53
End: 2023-08-21
Payer: COMMERCIAL

## 2023-08-23 ENCOUNTER — HOSPITAL ENCOUNTER (OUTPATIENT)
Facility: HOSPITAL | Age: 53
Setting detail: RECURRING SERIES
Discharge: HOME | End: 2023-08-26
Payer: COMMERCIAL

## 2023-08-23 PROCEDURE — 97140 MANUAL THERAPY 1/> REGIONS: CPT

## 2023-08-29 ENCOUNTER — APPOINTMENT (OUTPATIENT)
Facility: HOSPITAL | Age: 53
End: 2023-08-29
Payer: COMMERCIAL

## 2023-09-01 ENCOUNTER — HOSPITAL ENCOUNTER (OUTPATIENT)
Facility: HOSPITAL | Age: 53
Setting detail: RECURRING SERIES
Discharge: HOME | End: 2023-09-04
Payer: COMMERCIAL

## 2023-09-01 PROCEDURE — 97140 MANUAL THERAPY 1/> REGIONS: CPT

## 2023-09-01 NOTE — PROGRESS NOTES
and Other toe cap    Style: Flat knit and Foam based    Brand: [employer] and [employer]    Type: Custom: Juzo 3022SV knee high/toe cap; [employer]is Tribute, knee high. Vendor: [employer]     Education: To be performed when compression garments fit. Patient/family demonstrated donning and doffing with additional instructions needed for [place] product usage. [place]  [address] Self Care/Home Management (timed):  improve patient knowledge and understanding of pain reducing techniques, positioning, home safety, activity modification, diagnosis/prognosis, and physical therapy expectations, procedures and progression  to improve patient's ability to progress to PLOF and address remaining functional goals. (see flow sheet as applicable)    Skin care routine for R LE during L MLB phase. To wash daily with gentle wash cloth no vigorous scrubbing. To apply Lac Hydrin lotion to dry scaling skin areas and regular lotion to all others. Tolnaftate 1% to nails to keep nail fungus at [place]. Need for 2nd set of bandages for hygiene purposes  Progression of lymphedema if left untreated and risk of wounds and infections  Not to use Flexitouch pump over bandages. Can use pump on R LE only or on L LE when bandages are off. Elastic compression should not be worn at night (noted R LE below knee OTC stocking with bunching in popliteal crease). Patient states she got the stocking off [facility] and is \"not even sure if its the right size\" for her. PT advise elastic compression is not ok to sleep in. Recommending decongestion prior to wearing any compression on R LE.   95 [phone] Manual Therapy (timed):  decrease pain, increase tissue extensibility, and decrease edema to improve patient's ability to progress to PLOF and address remaining functional goals. The manual therapy interventions were performed at a separate and distinct time from the therapeutic activities interventions .  (see flow sheet as applicable)    Details if

## 2023-09-05 ENCOUNTER — HOSPITAL ENCOUNTER (OUTPATIENT)
Facility: HOSPITAL | Age: 53
Setting detail: RECURRING SERIES
Discharge: HOME | End: 2023-09-08
Payer: COMMERCIAL

## 2023-09-05 PROCEDURE — 97760 ORTHOTIC MGMT&TRAING 1ST ENC: CPT

## 2023-09-05 PROCEDURE — 97140 MANUAL THERAPY 1/> REGIONS: CPT

## 2023-09-05 NOTE — PROGRESS NOTES
PHYSICAL THERAPY/OCCUPATIONAL THERAPY - DAILY TREATMENT NOTE (updated 3/23)      Date: 2023          Patient Name:  Yovani Kaiser :  1970   Medical   Diagnosis:  Lymphedema, not elsewhere classified [I89.0] Treatment Diagnosis:  Q82.0     Hereditary lymphedema and R60.9     Edema, unspecified    Referral Source:  Regulo Walker MD Insurance:   Payor: [facility] / Plan: [facility] / Product Type: *No Product type* /                     Patient  verified yes     Visit #   Current  / Total 9 24   Time   In / Out 12:45 1415   Total Treatment Time 75   Total Timed Codes 75     Progress note due 2023    SUBJECTIVE    Pain Level (0-10 scale): 8/10, Left dorsal foot during fibrotic techniques    Any medication changes, allergies to medications, adverse drug reactions, diagnosis change, or new procedure performed?: [x] No    [] Yes (see summary sheet for update)  Medications: Verified on Patient Summary List    Subjective functional status/changes:     Patient arrives with R LE Profore and L LE MLB on. Patient arrives with all supplies in hand. Patient able to keep legs wrapped since last appointment. Patient arrives with clean supplies for bandaging L LE and Profore wraps supplied through insurance. No c/o with R LE being wrapped. OBJECTIVE      Therapeutic Procedures: Tx Min Billable or 1:1 Min (if diff from Tx Min) Procedure, Rationale, Specifics   15  31310 Orthotic Management and Training LE (timed): improve positioning of lower extremity during weight bearing and gait, improve pressure distrubution of the plantar aspect of the foot to improve patient's ability to progress to PLOF and address remaining functional goals. Details if applicable:     Lower Extremity Compression: Measured for the following compression products: Re measured for L LE and new measurements for R LE for day and night time compression.   Foot measurements are a bit elevated today,

## 2023-09-07 ENCOUNTER — HOSPITAL ENCOUNTER (OUTPATIENT)
Facility: HOSPITAL | Age: 53
Setting detail: RECURRING SERIES
Discharge: HOME | End: 2023-09-10
Payer: COMMERCIAL

## 2023-09-07 PROCEDURE — 97140 MANUAL THERAPY 1/> REGIONS: CPT

## 2023-09-07 NOTE — PROGRESS NOTES
Patient will demonstrate decrease in self-perceived functional impairment as evidenced by improved score on Lymphedema Life Impact Scale outcome measure from 65 % impairment to 45 % impairment within 12 weeks. 3.   Patient and/or caregiver will demonstrate independence with application of multi-layer bandaging for continued volume reduction and prevention of re-accumulation of fluid during maintenance  phase of CDT within 12 weeks. PLAN  Yes  Continue plan of care  Re-Cert Due: 84/56/4250  PN due: 9/23/23  []  Upgrade activities as tolerated  []  Discharge due to :  [x]  Other: continue MLB left LE and Profore wrap right LE, fit garments upon receiving. 2.  Check dru to orange foam malleoli 3.  Debridement of L LE ADALN    Lona Beckett, CRISTELA-DELANEY  9/7/2023       9:35 AM

## 2023-09-13 ENCOUNTER — HOSPITAL ENCOUNTER (OUTPATIENT)
Facility: HOSPITAL | Age: 53
Setting detail: RECURRING SERIES
Discharge: HOME | End: 2023-09-16
Payer: COMMERCIAL

## 2023-09-13 PROCEDURE — 97140 MANUAL THERAPY 1/> REGIONS: CPT

## 2023-09-13 NOTE — PROGRESS NOTES
PHYSICAL THERAPY/OCCUPATIONAL THERAPY - DAILY TREATMENT NOTE (updated 3/23)      Date: 2023          Patient Name:  Tash Alfred :  1970   Medical   Diagnosis:  Lymphedema, not elsewhere classified [I89.0] Treatment Diagnosis:  Q82.0     Hereditary lymphedema and R60.9     Edema, unspecified    Referral Source:  Naveed Hart MD Insurance:   Payor: [facility] / Plan: [facility] / Product Type: *No Product type* /                     Patient  verified yes     Visit #   Current  / Total 11 24   Time   In / Out 1125 1215   Total Treatment Time 50   Total Timed Codes 50     Progress note due 2023    SUBJECTIVE    Pain Level (0-10 scale): 2-3/10, tightness dorsal foot and ankle  Any medication changes, allergies to medications, adverse drug reactions, diagnosis change, or new procedure performed?: [x] No    [] Yes (see summary sheet for update)  Medications: Verified on Patient Summary List    Subjective functional status/changes:   Patient arrives with bandages and Profore on below knee bilaterally. Patient without c/o of pain. No c/o with R LE being wrapped, however, does report reddened area R anterior ankle, stating this is new since most recent treatment. Agrees to treatment, including compression application bilateral LE, Profore R, MLB L.      OBJECTIVE      Therapeutic Procedures: Tx Min Billable or 1:1 Min (if diff from Tx Min) Procedure, Rationale, Specifics   NP  51721 Orthotic Management and Training LE (timed): improve positioning of lower extremity during weight bearing and gait, improve pressure distrubution of the plantar aspect of the foot to improve patient's ability to progress to PLOF and address remaining functional goals. Details if applicable:     Lower Extremity Compression: Measured for the following compression products: Re measured for L LE and new measurements for R LE for day and night time compression.   Foot measurements are a

## 2023-09-18 ENCOUNTER — HOSPITAL ENCOUNTER (OUTPATIENT)
Facility: HOSPITAL | Age: 53
Setting detail: RECURRING SERIES
Discharge: HOME | End: 2023-09-21
Payer: COMMERCIAL

## 2023-09-18 PROCEDURE — 97140 MANUAL THERAPY 1/> REGIONS: CPT

## 2023-09-18 NOTE — PROGRESS NOTES
to continue decongesting the foot while garments are in production. Patient might require assistance to don toe cap L LE, donning/doffing aids for stockings. Pt says she received about 16 boxes of Profore wraps; advised her to bring one box per visit for right LE. L LE Skin changes persist with papillomas and abnormal shaping danna ankle and dorsal foot. Komprex 2 foam used today to assist with shaping and fibrosis reduction. Patient advised regarding conditions under which to remove bandaging supplies. Patient advised to launder and bring all supplies next treatment. Patient verbalized agreement with instructions provided. Patient will continue to benefit from skilled PT / OT services to address swelling, analyze and address soft tissue restrictions, analyze compression product fit and use, instruct in home lymphedema management program, and measure for compression products to address functional deficits and attain remaining goals. Progress toward goals / Updated goals:  []  See Progress Note/Re certification    Short Term Goals: To be accomplished in 6 weeks  Patient will demonstrate decreased volumetric measurements from 45921.92 ml of L LE  extremity to 50240 ml, in order to reduce risk for infection, decrease feeling of limb heaviness, and increase independence/tolerance for donning garments with minimal assistance in 6 weeks. 8/2/2023 met  2. Patient will report L extremity pain decrease from 6/10 to 4/10 during ambulation household distances for 10 minutes within 6 weeks. 8/2/2023 ongoing  3. Patient to perform 5/5 lymphedema remedial exercises in session with modified independence utilizing HEP handout, in order to promote optimal independence with management of condition,  as well as promote optimal limb volume reduction required for proper fit of donned clothing in 6 weeks. 8/2/2023 ongoing 9/1/2023 ongoing, patient supplied with replacement exercise handout.   4.   Patient/Caregiver to

## 2023-09-22 ENCOUNTER — HOSPITAL ENCOUNTER (OUTPATIENT)
Facility: HOSPITAL | Age: 53
Setting detail: RECURRING SERIES
Discharge: HOME | End: 2023-09-25
Payer: COMMERCIAL

## 2023-09-22 PROCEDURE — 97140 MANUAL THERAPY 1/> REGIONS: CPT

## 2023-09-22 NOTE — PROGRESS NOTES
[facility]   a part of [address] [place] [address]  [address] [facility], [address] [place] [address]   Harmeet Davila   Phone: [phone]  Fax: [phone]      PHYSICAL THERAPY PROGRESS NOTE  Patient Name:  Lila Cast :  1970   Treatment/Medical Diagnosis: Lymphedema, not elsewhere classified [I89.0]   Referral Source:  Aliyn Chua MD     Date of Initial Visit:  23 Attended Visits:  13 Missed Visits:  4     SUMMARY OF TREATMENT/ASSESSMENT:  Patient returned to clinic in July for evaluation and treatment of L LE lymphedema due to increase in severity, with new skin changes reported by patient. Patient presents with hyperpigmentation and hyperlipodermatosclerosis L LE, noting multiple papilloma anterior and posterior aspect L lower leg. Note R LE lymphedema present in lesser severity, however, patient was advised bilateral LE treatment recommended at this time. Patient instructed custom flat knit compression garments, custom night time compression garments likely indicated at conclusion of phase I CDT, due to foot/lower ankle involvement bilateral LE, severe skin and tissue changes L LE. Patient reports being committed to completing treatment at this time. Patient was advised lymphedema is a chronic, progressive condition which improves with management, however, cannot be cured. Advised in benefit of healthy, medically supervised weight loss improving outcome of lymphedema management. CURRENT STATUS  Patient tolerating treatment well and still waiting for  to contact patient and discuss insurance coverage's. Measurements sent previous visit for custom garments for  to authorize and provide: Juzo/solaris measurements on L LE and new measurements for R LE for daytime custom Juzo silver knee highs, and toe cap on the Left; and B/L Night  time Solaris Tribute knee highs. Mild elevation of L foot/toes and should be decongested by the time garments are manufactured.   Sent
independent 3/3 times within session to aid in reducing risk for infection and promote transition to maintenance phase of CDT in 12 weeks. 2.   Patient will demonstrate decrease in self-perceived functional impairment as evidenced by improved score on Lymphedema Life Impact Scale outcome measure from 65 % impairment to 45 % impairment within 12 weeks. 3.   Patient and/or caregiver will demonstrate independence with application of multi-layer bandaging for continued volume reduction and prevention of re-accumulation of fluid during maintenance  phase of CDT within 12 weeks. PLAN  Yes  Continue plan of care  Re-Cert Due: 47/39/1639  PN due: 10/22/23  []  Upgrade activities as tolerated  []  Discharge due to :  [x]  Other: 1. Check on garment status with  2.  continue MLB left LE and Profore wrap right LE, fit garments upon receiving. 2.  Check dru to orange foam malleoli 3. Debridement of L LE PRN    Marilynn Blevins, PT, DPT, CLT-DELANEY    9/22/2023       12:47 PM

## 2023-09-25 ENCOUNTER — APPOINTMENT (OUTPATIENT)
Facility: HOSPITAL | Age: 53
End: 2023-09-25
Payer: COMMERCIAL

## 2023-09-26 ENCOUNTER — HOSPITAL ENCOUNTER (OUTPATIENT)
Facility: HOSPITAL | Age: 53
Setting detail: RECURRING SERIES
Discharge: HOME | End: 2023-09-29
Payer: COMMERCIAL

## 2023-09-26 PROCEDURE — 97140 MANUAL THERAPY 1/> REGIONS: CPT

## 2023-09-26 NOTE — PROGRESS NOTES
PHYSICAL THERAPY/OCCUPATIONAL THERAPY - DAILY TREATMENT NOTE (updated 3/23)      Date: 2023          Patient Name:  Gale Rice :  1970   Medical   Diagnosis:  Lymphedema, not elsewhere classified [I89.0] Treatment Diagnosis:  Q82.0     Hereditary lymphedema and R60.9     Edema, unspecified    Referral Source:  Jarocho Velásquez MD Insurance:   Payor: [facility] / Plan: [facility] / Product Type: *No Product type* /                     Patient  verified yes     Visit #   Current  / Total 14 24   Time   In / Out 1420 1500   Total Treatment Time 40   Total Timed Codes 40       SUBJECTIVE    Pain Level (0-10 scale): 0/10  Any medication changes, allergies to medications, adverse drug reactions, diagnosis change, or new procedure performed?: [x] No    [] Yes (see summary sheet for update)  Medications: Verified on Patient Summary List    Subjective functional status/changes:     Patient arrives with bandages and Profore on below knee bilaterally. Patient without c/o of pain, only mild itching c/o. No c/o with R LE being wrapped, notes improvement with padding at R ankle region since last visit. Has not heard from [facility] regarding compression equipment. Pt agreeable to treatment, requests shortened visit since her daughter needs the car. OBJECTIVE      Therapeutic Procedures: Tx Min Billable or 1:1 Min (if diff from Tx Min) Procedure, Rationale, Specifics   NP  45167 Orthotic Management and Training LE (timed): improve positioning of lower extremity during weight bearing and gait, improve pressure distrubution of the plantar aspect of the foot to improve patient's ability to progress to PLOF and address remaining functional goals. Details if applicable:     Lower Extremity Compression: Measured for the following compression products: Re measured for L LE and new measurements for R LE for day and night time compression.   Foot measurements are a bit elevated

## 2023-09-28 ENCOUNTER — APPOINTMENT (OUTPATIENT)
Facility: HOSPITAL | Age: 53
End: 2023-09-28
Payer: COMMERCIAL

## 2023-09-29 ENCOUNTER — APPOINTMENT (OUTPATIENT)
Facility: HOSPITAL | Age: 53
End: 2023-09-29
Payer: COMMERCIAL

## 2023-10-02 ENCOUNTER — HOSPITAL ENCOUNTER (OUTPATIENT)
Facility: HOSPITAL | Age: 53
Setting detail: RECURRING SERIES
Discharge: HOME | End: 2023-10-05
Payer: COMMERCIAL

## 2023-10-02 PROCEDURE — 97140 MANUAL THERAPY 1/> REGIONS: CPT

## 2023-10-05 ENCOUNTER — HOSPITAL ENCOUNTER (OUTPATIENT)
Facility: HOSPITAL | Age: 53
Setting detail: RECURRING SERIES
Discharge: HOME | End: 2023-10-08
Payer: COMMERCIAL

## 2023-10-05 ENCOUNTER — TELEPHONE (OUTPATIENT)
Facility: CLINIC | Age: 53
End: 2023-10-05

## 2023-10-05 PROCEDURE — 97140 MANUAL THERAPY 1/> REGIONS: CPT

## 2023-10-05 NOTE — PROGRESS NOTES
PHYSICAL THERAPY/OCCUPATIONAL THERAPY - DAILY TREATMENT NOTE (updated 3/23)      Date: 10/5/2023          Patient Name:  Omar Vasquez :  1970   Medical   Diagnosis:  Lymphedema, not elsewhere classified [I89.0] Treatment Diagnosis:  Q82.0     Hereditary lymphedema and R60.9     Edema, unspecified    Referral Source:  Magdaleno Bassett MD Insurance:   Payor: [facility] / Plan: [facility] / Product Type: *No Product type* /                     Patient  verified yes     Visit #   Current  / Total 16 24   Time   In / Out 1110 1226   Total Treatment Time 76   Total Timed Codes 76       SUBJECTIVE    Pain Level (0-10 scale): 0/10  Any medication changes, allergies to medications, adverse drug reactions, diagnosis change, or new procedure performed?: [x] No    [] Yes (see summary sheet for update)  Medications: Verified on Patient Summary List    Subjective functional status/changes:     Patient arrives with bandages and Profore on below knee bilaterally. Patient without c/o of pain, only mild itching c/o L LE. No c/o with R LE being wrapped, notes improvement with padding at R ankle region since last visit. Pt says [facility] called and they are waiting for physician signed documentation. Pt agreeable to treatment. OBJECTIVE      Therapeutic Procedures: Tx Min Billable or 1:1 Min (if diff from Tx Min) Procedure, Rationale, Specifics   NP  72546 Orthotic Management and Training LE (timed): improve positioning of lower extremity during weight bearing and gait, improve pressure distrubution of the plantar aspect of the foot to improve patient's ability to progress to PLOF and address remaining functional goals. Details if applicable:     Lower Extremity Compression: Measured for the following compression products: Re measured for L LE and new measurements for R LE for day and night time compression.   Foot measurements are a bit elevated today, however due to production time of

## 2023-10-05 NOTE — TELEPHONE ENCOUNTER
Hank Bustamante called from [place] [facility] to check on status of CMN documents for compression garments faxed on 9/28/23. Unable to locate fax and Hank Bustamante faxed again for review. Pt last seen by Dr. Luis Villeda and documents were faxed to Dr. Ciara Myles, but can be signed off by Dr. Luis Villeda.  Ldm

## 2023-10-09 ENCOUNTER — HOSPITAL ENCOUNTER (OUTPATIENT)
Facility: HOSPITAL | Age: 53
Setting detail: RECURRING SERIES
Discharge: HOME | End: 2023-10-12
Payer: COMMERCIAL

## 2023-10-09 PROCEDURE — 97140 MANUAL THERAPY 1/> REGIONS: CPT

## 2023-10-09 NOTE — THERAPY RECERTIFICATION
[facility]   a part of [place]  [address] [facility], [address] [place] [address]   [facility]   Phone: [phone]  Fax: [phone]      CONTINUED PLAN OF CARE/RECERTIFICATION FOR PHYSICAL THERAPY          Patient Name:              Daniel Herrera :  1970   Treatment/Medical Diagnosis:  Lymphedema, not elsewhere classified [I89.0]   Onset Date:  , 9+ years    Referral Source:  Jadyn Hoyt MD Start of Care [facility]):  2023   Prior Hospitalization:  See Medical History Provider #:  [id number]      Prior Level of Function (PLOF):  Prior to increase in severity of lymphedema 9 years ago, patient was able to work full-time without limitations regarding lymphedema. Comorbidities:  BMI over 36, pre diabetes   Medications:  Verified on Patient Summary List   Visits from [facility]:  18 Missed Visits:  8      Progress toward Goals:  Patient will demonstrate decreased volumetric measurements from 35708.92 ml of L LE  extremity to 70461 ml, in order to reduce risk for infection, decrease feeling of limb heaviness, and increase independence/tolerance for donning garments with minimal assistance in 6 weeks. Status at last Eval/Progress Note: met  Current Status: met  Goal Met?  yes    2. Patient will report L extremity pain decrease from 6/10 to 4/10 during ambulation household distances for 10 minutes within 6 weeks. Status at last Eval/Progress Note: ongoing  Current Status: met  Goal Met?  yes    3. Patient to perform 5/5 lymphedema remedial exercises in session with modified independence utilizing HEP handout, in order to promote optimal independence with management of condition,  as well as promote optimal limb volume reduction required for proper fit of donned clothing in 6 weeks. Status at last Eval/Progress Note: ongoing  Current Status: improved  Goal Met?  no    4.  Patient/Caregiver to verbalize 3/3 signs and symptoms of infection without external cueing, in

## 2023-10-09 NOTE — PROGRESS NOTES
address remaining functional goals. The manual therapy interventions were performed at a separate and distinct time from the therapeutic activities interventions . (see flow sheet as applicable)    Details if applicable:  Manual Lymphatic Drainage (MLD):  Area to decongest: LE Bilateral  Limb volume measurements completed with results as noted below. Sequence used and effectiveness:  Secondary sequence for lower extremities with trunk involvement. Bilateral LE sequence performed, primary sequence, with patient tolerating well. Fibrosis techniques L LE, calf/ankle region. Additional time spent L foot to clear dorsal foot fluid with good response. Skin/wound care/debridement: Reviewed skin care principles:   Performed skin care with low pH lotion following manual lymph principles. , Skin care products. , and Prevention of cellulitis. Gentle removal of non-adherent fungal tissue using forceps, skin intact. No redness, irritation or itching post technique. Removed B/L Below knee bandages. Washed legs with hygenic wipes. Applied OTC antifungal L lower leg, applied Ammonium Lactate selective areas L LE, Sarna/Eucerin to calf all aspects L lower leg, R lower leg. Sarna used due to patient c/o itching. Applied multi-layer compression bandaging to: The following multi-layer bandages were applied:  Protouch Stockinette and Transelast toe wrap:     Toe wraps on L LE 1-4 Transelast - applied today    Padding layer:  L foot:  Komprex 2 dorsal foot-deferred  Orange kidney medial ankle  Komprex 2 L lower leg-deferred    R Foot:  Gray foam donut anterior ankle to reduce tension area of redness, skin intact  Fleece foot/ankle    Foam:  12 cm roll    Short stretch bandages:   6 cm, 8 cm,  10 cm  Edward sandal with 6cm then  Spiral technique 50% overlap moderate tension all other bandages    RLE:  Applied 4-layer Profore wrap from MTP to just below knee     Tubigrip at top to cover tape and at foot to prevent sliding of

## 2023-10-10 ENCOUNTER — TELEPHONE (OUTPATIENT)
Facility: CLINIC | Age: 53
End: 2023-10-10

## 2023-10-10 NOTE — TELEPHONE ENCOUNTER
Pt is calling to check on status  of CMN documents for compression garments per previous message.    Please reach out to [address]

## 2023-10-11 ENCOUNTER — APPOINTMENT (OUTPATIENT)
Facility: HOSPITAL | Age: 53
End: 2023-10-11
Payer: COMMERCIAL

## 2023-10-12 ENCOUNTER — HOSPITAL ENCOUNTER (OUTPATIENT)
Facility: HOSPITAL | Age: 53
Setting detail: RECURRING SERIES
Discharge: HOME | End: 2023-10-15
Payer: COMMERCIAL

## 2023-10-12 PROCEDURE — 97140 MANUAL THERAPY 1/> REGIONS: CPT

## 2023-10-12 NOTE — TELEPHONE ENCOUNTER
MD Momo Strong [facility] Clinical Staff Yesterday (8:15 AM)     AG  Yes this has been completed, I believe on Friday of last week. Should have gone into fax box. Not sure what happened after that.

## 2023-10-12 NOTE — PROGRESS NOTES
PHYSICAL THERAPY/OCCUPATIONAL THERAPY - DAILY TREATMENT NOTE (updated 3/23)      Date: 10/12/2023          Patient Name:  Marci Yeung :  1970   Medical   Diagnosis:  Lymphedema, not elsewhere classified [I89.0] Treatment Diagnosis:  Q82.0     Hereditary lymphedema and R60.9     Edema, unspecified    Referral Source:  Bart Perla MD Insurance:   Payor: [facility] / Plan: [facility] / Product Type: *No Product type* /                     Patient  verified yes     Visit #   Current  / Total 18 24   Time   In / Out 1300 1340   Total Treatment Time 40   Total Timed Codes 40       SUBJECTIVE    Pain Level (0-10 scale): 0/10  Any medication changes, allergies to medications, adverse drug reactions, diagnosis change, or new procedure performed?: [x] No    [] Yes (see summary sheet for update)  Medications: Verified on Patient Summary List    Subjective functional status/changes:     Patient arrives with bandages L LE and Profore on below knee R LE. Patient without c/o of pain, only mild itching c/o L LE., improved with application of antifungal ointment distal lower leg. No c/o with R LE being wrapped, notes improvement with foam donut to relieve pressure anterior ankle. [facility] says [facility] called and they are waiting for physician signed documentation. Pt agreeable to treatment, stating she would like to proceed with bandaging only due to her daughter needing the car to go to work. OBJECTIVE      Therapeutic Procedures: Tx Min Billable or 1:1 Min (if diff from Tx Min) Procedure, Rationale, Specifics   NP  62133 Orthotic Management and Training LE (timed): improve positioning of lower extremity during weight bearing and gait, improve pressure distrubution of the plantar aspect of the foot to improve patient's ability to progress to PLOF and address remaining functional goals.     Details if applicable:     Lower Extremity Compression: Measured for the following compression

## 2023-10-17 ENCOUNTER — APPOINTMENT (OUTPATIENT)
Facility: HOSPITAL | Age: 53
End: 2023-10-17
Payer: COMMERCIAL

## 2023-10-20 ENCOUNTER — HOSPITAL ENCOUNTER (OUTPATIENT)
Facility: HOSPITAL | Age: 53
Setting detail: RECURRING SERIES
Discharge: HOME | End: 2023-10-23
Payer: COMMERCIAL

## 2023-10-20 PROCEDURE — 97140 MANUAL THERAPY 1/> REGIONS: CPT

## 2023-10-20 NOTE — PROGRESS NOTES
PHYSICAL THERAPY/OCCUPATIONAL THERAPY - DAILY TREATMENT NOTE (updated 3/23)      Date: 10/20/2023          Patient Name:  Teri Agrawal :  1970   Medical   Diagnosis:  Lymphedema, not elsewhere classified [I89.0] Treatment Diagnosis:  Q82.0     Hereditary lymphedema and R60.9     Edema, unspecified    Referral Source:  Benjamin Jaime MD Insurance:   Payor: [facility] / Plan: [facility] / Product Type: *No Product type* /                     Patient  verified yes     Visit #   Current  / Total 2 24   Time   In / Out 1210 1315   Total Treatment Time 65   Total Timed Codes 65       SUBJECTIVE    Pain Level (0-10 scale): 0/10  Any medication changes, allergies to medications, adverse drug reactions, diagnosis change, or new procedure performed?: [x] No    [] Yes (see summary sheet for update)  Medications: Verified on Patient Summary List    Subjective functional status/changes:     Patient arrived with bandages removed and clean bandages to wear, receptive to proceeding with treatment today       OBJECTIVE      Therapeutic Procedures: Tx Min Billable or 1:1 Min (if diff from Tx Min) Procedure, Rationale, Specifics   NP  93351 Orthotic Management and Training LE (timed): improve positioning of lower extremity during weight bearing and gait, improve pressure distrubution of the plantar aspect of the foot to improve patient's ability to progress to PLOF and address remaining functional goals. Details if applicable:     Lower Extremity Compression: Measured for the following compression products: Re measured for L LE and new measurements for R LE for day and night time compression. Foot measurements are a bit elevated today, however due to production time of custom garments, patient will most likely be decongested again by the time garments arrive. Will submit order to .     LE Bilateral and    Knee high, Night Garment, and Other toe cap    Style: Flat knit and Foam
independent 3/3 times within session to aid in reducing risk for infection in 24 visits. ongoing  Patient will demonstrate decrease in self-perceived functional impairment as evidenced by improved score on LLIS outcome measure from 65% impairment to 50% impairment within 24 visits. Ongoing  Patient will demonstrate stable limb volumes of left lower extremity with no more than 400 mL increase/decrease over 3 visits to increase patient's ability to safely transition to home maintenance phase of CDT within 24 visits. ongoing    RECOMMENDATIONS  Continue w/plan of care including 2x weekly bandaging and MLD until able to transition to home program w/pump and custom compression garments. Hiram Hernandez OT       10/20/2023       1:26 PM    If you have any questions/comments please contact us directly at [phone]. Thank you for allowing us to assist in the care of your patient.

## 2023-10-24 ENCOUNTER — APPOINTMENT (OUTPATIENT)
Facility: HOSPITAL | Age: 53
End: 2023-10-24
Payer: COMMERCIAL

## 2023-10-26 ENCOUNTER — HOSPITAL ENCOUNTER (OUTPATIENT)
Facility: HOSPITAL | Age: 53
Setting detail: RECURRING SERIES
Discharge: HOME | End: 2023-10-29
Payer: COMMERCIAL

## 2023-10-26 PROCEDURE — 97140 MANUAL THERAPY 1/> REGIONS: CPT

## 2023-10-26 NOTE — PROGRESS NOTES
PHYSICAL THERAPY/OCCUPATIONAL THERAPY - DAILY TREATMENT NOTE (updated 3/23)      Date: 10/26/2023          Patient Name:  Carlito Bee :  1970   Medical   Diagnosis:  Lymphedema, not elsewhere classified [I89.0] Treatment Diagnosis:  Q82.0     Hereditary lymphedema and R60.9     Edema, unspecified    Referral Source:  Wilton Garcia MD Insurance:   Payor: [facility] / Plan: [facility] / Product Type: *No Product type* /                     Patient  verified yes     Visit #   Current  / Total 19 24   Time   In / Out 1430 1508   Total Treatment Time 38   Total Timed Codes 38       SUBJECTIVE    Pain Level (0-10 scale): 0/10  Any medication changes, allergies to medications, adverse drug reactions, diagnosis change, or new procedure performed?: [x] No    [] Yes (see summary sheet for update)  Medications: Verified on Patient Summary List    Subjective functional status/changes:     Patient reporting she goes on vacation tomorrow. She notes her leg has not looked this good in a while. She is pleased with progress, and reports she is still waiting to hear from [facility] about an approval for her garments. Pt agreeable to treatment, stating she would like to proceed with bandaging only due to her daughter needing the car to go to work. OBJECTIVE      Therapeutic Procedures: Tx Min Billable or 1:1 Min (if diff from Tx Min) Procedure, Rationale, Specifics   NP  41750 Orthotic Management and Training LE (timed): improve positioning of lower extremity during weight bearing and gait, improve pressure distrubution of the plantar aspect of the foot to improve patient's ability to progress to PLOF and address remaining functional goals. Details if applicable:     Lower Extremity Compression: Measured for the following compression products: Re measured for L LE and new measurements for R LE for day and night time compression.   Foot measurements are a bit elevated

## 2023-10-26 NOTE — PROGRESS NOTES
[facility]   a part of [facility]  [address] [facility], [address] [place] [address]   Harmeet Davila   Phone: [phone]  Fax: [phone]      PHYSICAL THERAPY PROGRESS NOTE  Patient Name:  Mariza Islas :  1970   Treatment/Medical Diagnosis: Lymphedema, not elsewhere classified [I89.0]   Referral Source:  Shanna Malloy MD     Date of Initial Visit:  23 Attended Visits:  19 Missed Visits:  4     SUMMARY OF TREATMENT/ASSESSMENT:  Since previous progress note patient has demonstrated significant improvement in LE volumes with improve LE shaping, and improved ease of mobility due to decreased edema. Patient is currently awaiting custom garments previously measured for and transmitted for insurance authorization. She requires continued treatment as she continues to demonstrate improving volumes, and benefit from therapy. Will fit custom garments upon receiving them and transition to maintenance therapy when she demonstrates ability to manage independently. From previous progress note: Patient returned to clinic in July for evaluation and treatment of L LE lymphedema due to increase in severity, with new skin changes reported by patient. Patient presents with hyperpigmentation and hyperlipodermatosclerosis L LE, noting multiple papilloma anterior and posterior aspect L lower leg. Note R LE lymphedema present in lesser severity, however, patient was advised bilateral LE treatment recommended at this time. Patient instructed custom flat knit compression garments, custom night time compression garments likely indicated at conclusion of phase I CDT, due to foot/lower ankle involvement bilateral LE, severe skin and tissue changes L LE. Patient reports being committed to completing treatment at this time. Patient was advised lymphedema is a chronic, progressive condition which improves with management, however, cannot be cured.   Advised in benefit of healthy, medically

## 2023-11-01 ENCOUNTER — HOSPITAL ENCOUNTER (OUTPATIENT)
Facility: HOSPITAL | Age: 53
Setting detail: RECURRING SERIES
Discharge: HOME | End: 2023-11-04
Payer: COMMERCIAL

## 2023-11-01 PROCEDURE — 97140 MANUAL THERAPY 1/> REGIONS: CPT

## 2023-11-01 NOTE — PROGRESS NOTES
garments arrive. Will submit order to . LE Bilateral and    Knee high, Night Garment, and Other toe cap    Style: Flat knit and Foam based    Brand: [employer]    Type: Custom: L Juzo 5903 SV knee high/toe cap; R Juzo 3022 SV knee high, B/L [employer], knee high. Vendor: [employer]     Education: To be performed when compression garments fit. Patient/family demonstrated donning and doffing with additional instructions needed for safe product usage. NP  23469 Self Care/Home Management (timed):  improve patient knowledge and understanding of pain reducing techniques, positioning, home safety, activity modification, diagnosis/prognosis, and physical therapy expectations, procedures and progression  to improve patient's ability to progress to PLOF and address remaining functional goals. (see flow sheet as applicable)    Reviewed s/s of infection with patient able to verbalize and recall 3/3 (warmth, pain, erythema) independently without assist   45  13714 Manual Therapy (timed):  decrease pain, increase tissue extensibility, and decrease edema to improve patient's ability to progress to PLOF and address remaining functional goals. The manual therapy interventions were performed at a separate and distinct time from the therapeutic activities interventions . (see flow sheet as applicable)    Details if applicable:  Manual Lymphatic Drainage (MLD):  Area to decongest: LE Bilateral   Skin/wound care/debridement: Reviewed skin care principles:   Performed skin care with low pH lotion following manual lymph principles. , Skin care products. , and Prevention of cellulitis. Removed B Below knee bandages. Washed legs with hygenic wipes. Applied OTC antifungal L lower leg. Sarna/Eucerin to calf all aspects L lower leg, R lower leg. Sarna used due to patient c/o itching. Applied multi-layer compression bandaging to:  The following multi-layer bandages were

## 2023-11-03 ENCOUNTER — HOSPITAL ENCOUNTER (OUTPATIENT)
Facility: HOSPITAL | Age: 53
Setting detail: RECURRING SERIES
Discharge: HOME | End: 2023-11-06
Payer: COMMERCIAL

## 2023-11-03 PROCEDURE — 97140 MANUAL THERAPY 1/> REGIONS: CPT

## 2023-11-03 NOTE — PROGRESS NOTES
PHYSICAL THERAPY/OCCUPATIONAL THERAPY - DAILY TREATMENT NOTE (updated 3/23)      Date: 11/3/2023          Patient Name:  Coni Rose :  1970   Medical   Diagnosis:  Lymphedema, not elsewhere classified [I89.0] Treatment Diagnosis:  Q82.0     Hereditary lymphedema and R60.9     Edema, unspecified    Referral Source:  Horacio Gonzalez MD Insurance:   Payor: [facility] / Plan: [facility] / Product Type: *No Product type* /                     Patient  verified yes     Visit #   Current  / Total 21 24   Time   In / Out 1005 1100   Total Treatment Time 55   Total Timed Codes 55       SUBJECTIVE    Pain Level (0-10 scale): 0/10  Any medication changes, allergies to medications, adverse drug reactions, diagnosis change, or new procedure performed?: [x] No    [] Yes (see summary sheet for update)  Medications: Verified on Patient Summary List    Subjective functional status/changes:     Patient says she went to [facility] last weekend and was on her feet a lot, notes increased swelling at left dorsal foot. Pt says she received a call from  that garments were approved and have been put into production. Pt agreeable to treatment. OBJECTIVE      Therapeutic Procedures: Tx Min Billable or 1:1 Min (if diff from Tx Min) Procedure, Rationale, Specifics   NP  95622 Orthotic Management and Training LE (timed): improve positioning of lower extremity during weight bearing and gait, improve pressure distrubution of the plantar aspect of the foot to improve patient's ability to progress to PLOF and address remaining functional goals. Details if applicable:     Lower Extremity Compression: Measured for the following compression products: Re measured for L LE and new measurements for R LE for day and night time compression.   Foot measurements are a bit elevated today, however due to production time of custom garments, patient will most likely be decongested again by the time

## 2023-11-07 ENCOUNTER — HOSPITAL ENCOUNTER (OUTPATIENT)
Facility: HOSPITAL | Age: 53
Setting detail: RECURRING SERIES
Discharge: HOME | End: 2023-11-10
Payer: COMMERCIAL

## 2023-11-07 PROCEDURE — 97140 MANUAL THERAPY 1/> REGIONS: CPT

## 2023-11-10 ENCOUNTER — APPOINTMENT (OUTPATIENT)
Facility: HOSPITAL | Age: 53
End: 2023-11-10
Payer: COMMERCIAL

## 2023-11-14 ENCOUNTER — HOSPITAL ENCOUNTER (OUTPATIENT)
Facility: HOSPITAL | Age: 53
Setting detail: RECURRING SERIES
Discharge: HOME | End: 2023-11-17
Payer: COMMERCIAL

## 2023-11-14 PROCEDURE — 97140 MANUAL THERAPY 1/> REGIONS: CPT

## 2023-11-14 NOTE — PROGRESS NOTES
PHYSICAL THERAPY/OCCUPATIONAL THERAPY - DAILY TREATMENT NOTE (updated 3/23)      Date: 2023          Patient Name:  Jeremy Arnold :  1970   Medical   Diagnosis:  Lymphedema, not elsewhere classified [I89.0] Treatment Diagnosis:  Q82.0     Hereditary lymphedema and R60.9     Edema, unspecified    Referral Source:  Katie Lau MD Insurance:   Payor: [facility] / Plan: [facility] / Product Type: *No Product type* /                     Patient  verified yes     Visit #   Current  / Total 3 24   Time   In / Out 0945 1045   Total Treatment Time 60   Total Timed Codes 60       SUBJECTIVE    Pain Level (0-10 scale): 0/10  Any medication changes, allergies to medications, adverse drug reactions, diagnosis change, or new procedure performed?: [x] No    [] Yes (see summary sheet for update)  Medications: Verified on Patient Summary List    Subjective functional status/changes:     Patient arrived with both legs bandaged w/short stretch bandages so she did not have clean set to wash and prepare for left leg bandaging today. Pt requesting to bandage BLE w/profore today and brought adequate supplies with her     OBJECTIVE      Therapeutic Procedures: Tx Min Billable or 1:1 Min (if diff from Tx Min) Procedure, Rationale, Specifics   NP  67738 Orthotic Management and Training LE (timed): improve positioning of lower extremity during weight bearing and gait, improve pressure distrubution of the plantar aspect of the foot to improve patient's ability to progress to PLOF and address remaining functional goals. Details if applicable:     Lower Extremity Compression: Measured for the following compression products: Re measured for L LE and new measurements for R LE for day and night time compression. Foot measurements are a bit elevated today, however due to production time of custom garments, patient will most likely be decongested again by the time garments arrive.   Will

## 2023-11-14 NOTE — THERAPY RECERTIFICATION
[facility]   a part of [address] [place] [address]  [address] [facility], [address] [place] [address]   Harmeet Davila   Phone: [phone]  Fax: [phone]      CONTINUED PLAN OF CARE/RECERTIFICATION FOR OCCUPATIONAL THERAPY          Patient Name:              Janessa Elkins :  1970   Treatment/Medical Diagnosis:  Lymphedema, not elsewhere classified [I89.0]   Onset Date:  9+ years    Referral Source:  Darci Corral MD [facility]):  2023   Prior Hospitalization:  See Medical History Provider #:  [id number]      Prior Level of Function (PLOF):  Prior to increase in severity of lymphedema 9 years ago, patient was able to work full-time without limitations regarding lymphedema. Comorbidities:  Supraumbilical hernia repair with mesh ; pre diabetes, BMI over 36. Medications:  Verified on Patient Summary List   Visits from [facility]:  3 Missed Visits:  0     Progress toward Goals:  Short Term Goals: To be accomplished in 12 treatments  Patient will independently identify at least 3 practices of proper skin care/infection prevention over 3 consecutive sessions to reduce risk of infections and wounds in 12 visits. goal met   Patient will demonstrate independence in lymphedema home program of therapeutic exercises and self MLD over 3 consecutive sessions to improve circulation and decongest limb and for increased independence/tolerance for functional activities within 12 visits. Goal met   Patient will tolerate wearing compression garment for 4 hours a day between sessions over 3 consecutive visits to improve circulation and decongest limb and for increased independence/tolerance for functional activities within 12 visits. ongoing     Long Term Goals:  To be accomplished in 24 treatments  Patient will demonstrate improved edema management evidenced by performing donning/doffing of garments from dependent to modified independent 3/3 times within session to aid in reducing risk for

## 2023-11-17 ENCOUNTER — HOSPITAL ENCOUNTER (OUTPATIENT)
Facility: HOSPITAL | Age: 53
Setting detail: RECURRING SERIES
Discharge: HOME | End: 2023-11-20
Payer: COMMERCIAL

## 2023-11-17 PROCEDURE — 97760 ORTHOTIC MGMT&TRAING 1ST ENC: CPT

## 2023-11-17 PROCEDURE — 97140 MANUAL THERAPY 1/> REGIONS: CPT

## 2023-11-17 NOTE — PROGRESS NOTES
PHYSICAL THERAPY/OCCUPATIONAL THERAPY - DAILY TREATMENT NOTE (updated 3/23)      Date: 2023          Patient Name:  Jose Amado :  1970   Medical   Diagnosis:  Lymphedema, not elsewhere classified [I89.0] Treatment Diagnosis:  Q82.0     Hereditary lymphedema and R60.9     Edema, unspecified    Referral Source:  Coby Colunga MD Insurance:   Payor: [facility] / Plan: [facility] / Product Type: *No Product type* /                     Patient  verified yes     Visit #   Current  / Total 4 24   Time   In / Out 09:55 10:55   Total Treatment Time 60   Total Timed Codes 60       SUBJECTIVE    Pain Level (0-10 scale): 0/10  Any medication changes, allergies to medications, adverse drug reactions, diagnosis change, or new procedure performed?: [x] No    [] Yes (see summary sheet for update)  Medications: Verified on Patient Summary List    Subjective functional status/changes:     Patient arrived with both legs bandaged w/short stretch bandages so she did not have clean set to wash and prepare for left leg bandaging today. Pt requesting to bandage BLE w/profore today and brought adequate supplies with her     OBJECTIVE      Therapeutic Procedures: Tx Min Billable or 1:1 Min (if diff from Tx Min) Procedure, Rationale, Specifics   15 15 39214 Orthotic Management and Training LE (timed): improve positioning of lower extremity during weight bearing and gait, improve pressure distrubution of the plantar aspect of the foot to improve patient's ability to progress to PLOF and address remaining functional goals. Details if applicable:     Lower Extremity Compression: Measured for the following compression products: Re measured for L LE and new measurements for R LE for day and night time compression. Foot measurements are a bit elevated today, however due to production time of custom garments, patient will most likely be decongested again by the time garments arrive.

## 2023-11-17 NOTE — TELEPHONE ENCOUNTER
Last OV 8/21/19    Patient would like [facility] day [facility], and a 30 day supply sent to [facility] since she is completely out.  Please send Pt is requesting a refill on  furosemide 40 mg     Please send to [employer] at [facility]

## 2023-11-20 RX ORDER — FUROSEMIDE 40 MG/1
40 TABLET ORAL DAILY
Qty: 60 TABLET | Refills: 0 | Status: SHIPPED | OUTPATIENT
Start: 2023-11-20

## 2023-11-20 NOTE — TELEPHONE ENCOUNTER
No labs in over a year, not seen by me since may 2022 in person  Please offer patient in person visit  Also last few visits with Dr Priscila West if she prefers to see him Ruchi Sahu but just need in office visit with labs for safety check on this medication

## 2023-11-21 ENCOUNTER — APPOINTMENT (OUTPATIENT)
Facility: HOSPITAL | Age: 53
End: 2023-11-21
Payer: COMMERCIAL

## 2023-11-27 ENCOUNTER — HOSPITAL ENCOUNTER (OUTPATIENT)
Facility: HOSPITAL | Age: 53
Setting detail: RECURRING SERIES
Discharge: HOME | End: 2023-11-30
Payer: COMMERCIAL

## 2023-11-27 PROCEDURE — 97140 MANUAL THERAPY 1/> REGIONS: CPT

## 2023-11-27 NOTE — PROGRESS NOTES
PHYSICAL THERAPY/OCCUPATIONAL THERAPY - DAILY TREATMENT NOTE (updated 3/23)      Date: 2023          Patient Name:  Coni Rose :  1970   Medical   Diagnosis:  Lymphedema, not elsewhere classified [I89.0] Treatment Diagnosis:  Q82.0     Hereditary lymphedema and R60.9     Edema, unspecified    Referral Source:  Horacio Gonzalez MD Insurance:   Payor: [facility] / Plan: [facility] / Product Type: *No Product type* /                     Patient  verified yes     Visit #   Current  / Total 7 since recert 24 total 24 recert    Time   In / Out 0945 1059   Total Treatment Time 74   Total Timed Codes 74       SUBJECTIVE    Pain Level (0-10 scale): 0/10  Any medication changes, allergies to medications, adverse drug reactions, diagnosis change, or new procedure performed?: [x] No    [] Yes (see summary sheet for update)  Medications: Verified on Patient Summary List    Subjective functional status/changes:     Patient stated she was able to get in for treatment today off the cancellation list. Pt says she received a call from  that garments were approved and have been put into production, however, plans to call them to ask for ETA. Pt agreeable to treatment. OBJECTIVE      Therapeutic Procedures: Tx Min Billable or 1:1 Min (if diff from Tx Min) Procedure, Rationale, Specifics   NP  38680 Orthotic Management and Training LE (timed): improve positioning of lower extremity during weight bearing and gait, improve pressure distrubution of the plantar aspect of the foot to improve patient's ability to progress to PLOF and address remaining functional goals. Details if applicable:     Lower Extremity Compression: Measured for the following compression products: Re measured for L LE and new measurements for R LE for day and night time compression.   Foot measurements are a bit elevated today, however due to production time of custom garments, patient will

## 2023-11-27 NOTE — PROGRESS NOTES
[facility]   a part of [facility]  [address] [facility], [address] [place] [address]   Harmeet Davila   Phone: [phone]  Fax: [phone]      PHYSICAL THERAPY PROGRESS NOTE  Patient Name:  Lester Pemberton :  1970   Treatment/Medical Diagnosis: Lymphedema, not elsewhere classified [I89.0]   Referral Source:  Jaciel Toribio MD     Date of Initial Visit:  23 Attended Visits:  31 Missed Visits:  4     SUMMARY OF TREATMENT/ASSESSMENT:  Patient returned to clinic in July for evaluation and treatment of L LE lymphedema due to increase in severity, with new skin changes reported by patient. Patient presents with hyperpigmentation and hyperlipodermatosclerosis L LE, noting multiple papilloma anterior and posterior aspect L lower leg. Note R LE lymphedema present in lesser severity, however, patient was advised bilateral LE treatment recommended at this time. Patient instructed custom flat knit compression garments, custom night time compression garments likely indicated at conclusion of phase I CDT, due to foot/lower ankle involvement bilateral LE, severe skin and tissue changes L LE. Patient reports being committed to completing treatment at this time. Patient was advised lymphedema is a chronic, progressive condition which improves with management, however, cannot be cured. Advised in benefit of healthy, medically supervised weight loss improving outcome of lymphedema management. Note significant improvement in bilateral LE limb volume measurements as noted below. R LE reduced by 1756.49 mL, L LE reduced by 2067. 94 mL. Measurements for compression garments complete with order placed. Treatment to continue until compression garments successfully fit, transition patient to home management of lymphedema at that time. CURRENT STATUS  Patient tolerating treatment well, awaiting compression garment arrival to allow for fit.   Measurements sent previous visit for custom

## 2023-11-30 ENCOUNTER — HOSPITAL ENCOUNTER (OUTPATIENT)
Facility: HOSPITAL | Age: 53
Setting detail: RECURRING SERIES
End: 2023-11-30
Payer: COMMERCIAL

## 2023-11-30 PROCEDURE — 97140 MANUAL THERAPY 1/> REGIONS: CPT

## 2023-11-30 NOTE — PROGRESS NOTES
PHYSICAL THERAPY/OCCUPATIONAL THERAPY - DAILY TREATMENT NOTE (updated 3/23)      Date: 2023          Patient Name:  Coni Rose :  1970   Medical   Diagnosis:  Lymphedema, not elsewhere classified [I89.0] Treatment Diagnosis:  Q82.0     Hereditary lymphedema and R60.9     Edema, unspecified    Referral Source:  Horacio Gonzalez MD Insurance:   Payor: [facility] / Plan: [facility] / Product Type: *No Product type* /                     Patient  verified yes     Visit #   Current  / Total 8 since recert 25 total 24 recert 9534   Time   In / Out 1415 1500   Total Treatment Time 45   Total Timed Codes 45       SUBJECTIVE    Pain Level (0-10 scale): 0/10  Any medication changes, allergies to medications, adverse drug reactions, diagnosis change, or new procedure performed?: [x] No    [] Yes (see summary sheet for update)  Medications: Verified on Patient Summary List    Subjective functional status/changes:     Patient arrives to appt with MLB intact B LE, reports good tolerance. Pt says she received a call from  that garments were approved and have been put into production, however, plans to call them to ask for ETA. Pt agreeable to treatment, requests MLB only. OBJECTIVE      Therapeutic Procedures: Tx Min Billable or 1:1 Min (if diff from Tx Min) Procedure, Rationale, Specifics   NP  08177 Orthotic Management and Training LE (timed): improve positioning of lower extremity during weight bearing and gait, improve pressure distrubution of the plantar aspect of the foot to improve patient's ability to progress to PLOF and address remaining functional goals. Details if applicable:     Lower Extremity Compression: Measured for the following compression products: Re measured for L LE and new measurements for R LE for day and night time compression.   Foot measurements are a bit elevated today, however due to production time of custom garments, patient will

## 2023-12-05 ENCOUNTER — HOSPITAL ENCOUNTER (OUTPATIENT)
Facility: HOSPITAL | Age: 53
Setting detail: RECURRING SERIES
Discharge: HOME | End: 2023-12-08
Payer: COMMERCIAL

## 2023-12-05 PROCEDURE — 97140 MANUAL THERAPY 1/> REGIONS: CPT

## 2023-12-05 NOTE — PROGRESS NOTES
the cancellation list. Pt continues to benefit from 1-2x week bandaging to promote optimal limb volume reduction and allow for appropriate fit of garments upon receiving. Pt's garments have been approved and are now in production. Night time garments appear to be arriving tomorrow, with patient to bring items to next appointment for fit. Patient understands she will not transition to home management until both day and night time compression garments successfully fit. Patient will continue to benefit from skilled PT / OT services to address swelling, analyze and address soft tissue restrictions, analyze compression product fit and use, instruct in home lymphedema management program, and measure for compression products to address functional deficits and attain remaining goals. Progress toward goals / Updated goals:  [x]  See Progress Note/Re certification  Daytime/night time compression garments to be successfully fit with patient able to don/doff 2/2 times in clinic with appropriate donning/doffing aids to prevent accumulation of lymphatic fluid bilateral LE.  11/27/2023 ongoing  Patient will demonstrate decrease in self perceived functional impairment by 15-20% compared with that at initial evaluation upon discharge. 11/27/2023 ongoing  Patient will be independent in all aspects of phase II CDT including skin care, vasopneumatic pump use, exercise, compression garment management, including awareness of s/s of infection to allow patient to be safely transitioned to phase II CDT with reduction in risk of relapse regarding lymphedema management after discharge. 11/27/2023 ongoing      PLAN  Yes  Continue plan of care  Re-Cert Due: 5/0/2969  PN completed 11/27/2023  []  Upgrade activities as tolerated  []  Discharge due to :  [x]  Other: continue MLB bilateral LE, fit garments upon receiving.      Autumn Koroma, PT, CLT-DELANEY     12/5/2023       2:25 PM

## 2023-12-06 ENCOUNTER — HOSPITAL ENCOUNTER (OUTPATIENT)
Facility: HOSPITAL | Age: 53
Discharge: HOME | End: 2023-12-09
Attending: FAMILY MEDICINE
Payer: COMMERCIAL

## 2023-12-06 VITALS — WEIGHT: 231 LBS | BODY MASS INDEX: 40.93 KG/M2 | HEIGHT: 63 IN

## 2023-12-06 DIAGNOSIS — Z12.31 VISIT FOR SCREENING MAMMOGRAM: ICD-10-CM

## 2023-12-06 PROCEDURE — 77063 BREAST TOMOSYNTHESIS BI: CPT

## 2023-12-07 ENCOUNTER — HOSPITAL ENCOUNTER (OUTPATIENT)
Facility: HOSPITAL | Age: 53
Setting detail: RECURRING SERIES
Discharge: HOME | End: 2023-12-10
Payer: COMMERCIAL

## 2023-12-07 PROCEDURE — 97140 MANUAL THERAPY 1/> REGIONS: CPT

## 2023-12-07 PROCEDURE — 97763 ORTHC/PROSTC MGMT SBSQ ENC: CPT

## 2023-12-07 NOTE — PROGRESS NOTES
PHYSICAL THERAPY/OCCUPATIONAL THERAPY - DAILY TREATMENT NOTE (updated 3/23)      Date: 2023          Patient Name:  Gale Rice :  1970   Medical   Diagnosis:  Lymphedema, not elsewhere classified [I89.0] Treatment Diagnosis:  Q82.0     Hereditary lymphedema and R60.9     Edema, unspecified    Referral Source:  Jarocho Velásquez MD Insurance:   Payor: [facility] / Plan: [facility] / Product Type: *No Product type* /                     Patient  verified yes     Visit #   Current  / Total 10 since recert 27 total 24 recert 10/0/2731   Time   In / Out 1132 1228   Total Treatment Time 56   Total Timed Codes 56       SUBJECTIVE    Pain Level (0-10 scale): 0/10  Any medication changes, allergies to medications, adverse drug reactions, diagnosis change, or new procedure performed?: [x] No    [] Yes (see summary sheet for update)  Medications: Verified on Patient Summary List    Subjective functional status/changes:     Patient arrives to appt with MLB intact B LE, and reports her night time garments came. She arrives with garments in hand. She reports she got a new job in [facility] which will require her to train in [address] [place] [address] for one week, and then she will return to [place] to work remotely. She will come back for fit testing of her daytime garments when she is available. Pt agreeable to treatment, requests MLB only. OBJECTIVE      Therapeutic Procedures: Tx Min Billable or 1:1 Min (if diff from Tx Min) Procedure, Rationale, Specifics   24  08351 Orthotic Management and Training LE (timed): improve positioning of lower extremity during weight bearing and gait, improve pressure distrubution of the plantar aspect of the foot to improve patient's ability to progress to PLOF and address remaining functional goals.     Details if applicable:     Lower Extremity Compression: Fitted for the following compression products: Re measured for L LE and new measurements for R LE for

## 2023-12-13 ENCOUNTER — APPOINTMENT (OUTPATIENT)
Facility: HOSPITAL | Age: 53
End: 2023-12-13
Payer: COMMERCIAL

## 2023-12-21 ENCOUNTER — APPOINTMENT (OUTPATIENT)
Facility: HOSPITAL | Age: 53
End: 2023-12-21
Payer: COMMERCIAL

## 2023-12-26 ENCOUNTER — HOSPITAL ENCOUNTER (OUTPATIENT)
Facility: HOSPITAL | Age: 53
Setting detail: RECURRING SERIES
Discharge: HOME | End: 2023-12-29
Payer: COMMERCIAL

## 2023-12-26 PROCEDURE — 97535 SELF CARE MNGMENT TRAINING: CPT

## 2023-12-26 PROCEDURE — 97140 MANUAL THERAPY 1/> REGIONS: CPT

## 2023-12-26 PROCEDURE — 97760 ORTHOTIC MGMT&TRAING 1ST ENC: CPT

## 2023-12-26 NOTE — PROGRESS NOTES
PHYSICAL THERAPY/OCCUPATIONAL THERAPY - DAILY TREATMENT NOTE (updated 3/23)      Date: 2023          Patient Name:  Scottie Wen :  1970   Medical   Diagnosis:  Lymphedema, not elsewhere classified [I89.0] Treatment Diagnosis:  Q82.0     Hereditary lymphedema and R60.9     Edema, unspecified    Referral Source:  Temo Walker MD Insurance:   Payor: [facility] / Plan: [facility] / Product Type: *No Product type* /                     Patient  verified yes     Visit #   Current  / Total 11 since recert 28 total 24 recert 97/3/6857   Time   In / Out 1125 1225   Total Treatment Time 60   Total Timed Codes 60       SUBJECTIVE    Pain Level (0-10 scale): 0/10  Any medication changes, allergies to medications, adverse drug reactions, diagnosis change, or new procedure performed?: [x] No    [] Yes (see summary sheet for update)  Medications: Verified on Patient Summary List    Subjective functional status/changes:     Patient arrives to appt with MLB intact B LE, says she has remained bandaged since visit 2 weeks ago. Pt had to cancel last appt due to a death in the family. Pt received custom daytime garments and brought for fitting today. OBJECTIVE      Therapeutic Procedures: Tx Min Billable or 1:1 Min (if diff from Tx Min) Procedure, Rationale, Specifics   30  31354 Orthotic Management and Training LE (timed): improve positioning of lower extremity during weight bearing and gait, improve pressure distrubution of the plantar aspect of the foot to improve patient's ability to progress to PLOF and address remaining functional goals.     Details if applicable:     Lower Extremity Compression: Fitted for the following compression products:     LE Bilateral Knee high, Top band silicone border, Open Toe, and Other: left LE toe cap    Style: Flat knit    Brand: Juzo     Type: Custom:  Juzo 3022 custom knee high stockings, open toe, silicone border; Juzo 3048 custom

## 2023-12-26 NOTE — PROGRESS NOTES
[facility]   a part of [facility]  [address] [facility], [address] [place] [address]   Harmeet Davila   Phone: [phone]  Fax: [phone]      PHYSICAL THERAPY PROGRESS NOTE  Patient Name:  Conrad Fragoso :  1970   Treatment/Medical Diagnosis: Lymphedema, not elsewhere classified [I89.0]   Referral Source:  Debbie Carlin MD     Date of Initial Visit:  23 Attended Visits:  28 Missed Visits:  4     SUMMARY OF TREATMENT/ASSESSMENT:  Patient returned to clinic in July for evaluation and treatment of L LE lymphedema due to increase in severity, with new skin changes reported by patient. Patient presents with hyperpigmentation and hyperlipodermatosclerosis L LE, noting multiple papilloma anterior and posterior aspect L lower leg. Note R LE lymphedema present in lesser severity, however, patient was advised bilateral LE treatment recommended at this time. Patient instructed custom flat knit compression garments, custom night time compression garments likely indicated at conclusion of phase I CDT, due to foot/lower ankle involvement bilateral LE, severe skin and tissue changes L LE. Patient reports being committed to completing treatment at this time. Patient was advised lymphedema is a chronic, progressive condition which improves with management, however, cannot be cured. Advised in benefit of healthy, medically supervised weight loss improving outcome of lymphedema management. Note significant improvement in bilateral LE limb volume measurements as noted below. Will plan to see pt 1-2 additional visits to ensure effectiveness/compliance with home compression routine. CURRENT STATUS  Pt presents today with increased girth measurements at left foot, however has remained in bandages x 2 weeks. Pt able to fit into custom toe cap/stocking on both legs this visit.  Encouraged pt to initiate daily wear of stockings, nightly wear of Tributes, and daily use of pump to help

## 2024-02-19 ENCOUNTER — APPOINTMENT (OUTPATIENT)
Dept: VASCULAR SURGERY | Facility: HOSPITAL | Age: 54
End: 2024-02-19
Attending: STUDENT IN AN ORGANIZED HEALTH CARE EDUCATION/TRAINING PROGRAM
Payer: COMMERCIAL

## 2024-02-19 ENCOUNTER — HOSPITAL ENCOUNTER (EMERGENCY)
Facility: HOSPITAL | Age: 54
Discharge: HOME | End: 2024-02-19
Attending: EMERGENCY MEDICINE
Payer: COMMERCIAL

## 2024-02-19 VITALS
SYSTOLIC BLOOD PRESSURE: 145 MMHG | BODY MASS INDEX: 41.64 KG/M2 | RESPIRATION RATE: 18 BRPM | HEIGHT: 63 IN | TEMPERATURE: 97.9 F | HEART RATE: 84 BPM | WEIGHT: 235 LBS | OXYGEN SATURATION: 98 % | DIASTOLIC BLOOD PRESSURE: 82 MMHG

## 2024-02-19 DIAGNOSIS — I89.0 LYMPHEDEMA: Primary | ICD-10-CM

## 2024-02-19 LAB
ALBUMIN SERPL-MCNC: 3.4 G/DL (ref 3.5–5)
ALBUMIN/GLOB SERPL: 0.7 (ref 1.1–2.2)
ALP SERPL-CCNC: 95 U/L (ref 45–117)
ALT SERPL-CCNC: 45 U/L (ref 12–78)
ANION GAP SERPL CALC-SCNC: 8 MMOL/L (ref 5–15)
AST SERPL-CCNC: 59 U/L (ref 15–37)
BASOPHILS # BLD: 0 K/UL (ref 0–0.1)
BASOPHILS NFR BLD: 1 % (ref 0–1)
BILIRUB SERPL-MCNC: 0.5 MG/DL (ref 0.2–1)
BUN SERPL-MCNC: 8 MG/DL (ref 6–20)
BUN/CREAT SERPL: 10 (ref 12–20)
CALCIUM SERPL-MCNC: 9.6 MG/DL (ref 8.5–10.1)
CHLORIDE SERPL-SCNC: 104 MMOL/L (ref 97–108)
CO2 SERPL-SCNC: 26 MMOL/L (ref 21–32)
COMMENT:: NORMAL
CREAT SERPL-MCNC: 0.82 MG/DL (ref 0.55–1.02)
DIFFERENTIAL METHOD BLD: ABNORMAL
EOSINOPHIL # BLD: 0.2 K/UL (ref 0–0.4)
EOSINOPHIL NFR BLD: 3 % (ref 0–7)
ERYTHROCYTE [DISTWIDTH] IN BLOOD BY AUTOMATED COUNT: 14.6 % (ref 11.5–14.5)
GLOBULIN SER CALC-MCNC: 4.6 G/DL (ref 2–4)
GLUCOSE SERPL-MCNC: 129 MG/DL (ref 65–100)
HCT VFR BLD AUTO: 39.5 % (ref 35–47)
HGB BLD-MCNC: 13.6 G/DL (ref 11.5–16)
IMM GRANULOCYTES # BLD AUTO: 0 K/UL (ref 0–0.04)
IMM GRANULOCYTES NFR BLD AUTO: 0 % (ref 0–0.5)
LYMPHOCYTES # BLD: 2.3 K/UL (ref 0.8–3.5)
LYMPHOCYTES NFR BLD: 26 % (ref 12–49)
MCH RBC QN AUTO: 34.4 PG (ref 26–34)
MCHC RBC AUTO-ENTMCNC: 34.4 G/DL (ref 30–36.5)
MCV RBC AUTO: 100 FL (ref 80–99)
MONOCYTES # BLD: 0.6 K/UL (ref 0–1)
MONOCYTES NFR BLD: 7 % (ref 5–13)
NEUTS SEG # BLD: 5.5 K/UL (ref 1.8–8)
NEUTS SEG NFR BLD: 63 % (ref 32–75)
NRBC # BLD: 0 K/UL (ref 0–0.01)
NRBC BLD-RTO: 0 PER 100 WBC
PLATELET # BLD AUTO: 289 K/UL (ref 150–400)
PMV BLD AUTO: 10.7 FL (ref 8.9–12.9)
POTASSIUM SERPL-SCNC: 4 MMOL/L (ref 3.5–5.1)
PROT SERPL-MCNC: 8 G/DL (ref 6.4–8.2)
RBC # BLD AUTO: 3.95 M/UL (ref 3.8–5.2)
SODIUM SERPL-SCNC: 138 MMOL/L (ref 136–145)
SPECIMEN HOLD: NORMAL
WBC # BLD AUTO: 8.7 K/UL (ref 3.6–11)

## 2024-02-19 PROCEDURE — 36415 COLL VENOUS BLD VENIPUNCTURE: CPT

## 2024-02-19 PROCEDURE — 85025 COMPLETE CBC W/AUTO DIFF WBC: CPT

## 2024-02-19 PROCEDURE — 80053 COMPREHEN METABOLIC PANEL: CPT

## 2024-02-19 PROCEDURE — 99284 EMERGENCY DEPT VISIT MOD MDM: CPT

## 2024-02-19 PROCEDURE — 93970 EXTREMITY STUDY: CPT

## 2024-02-19 ASSESSMENT — PAIN SCALES - GENERAL: PAINLEVEL_OUTOF10: 7

## 2024-02-19 ASSESSMENT — PAIN DESCRIPTION - ORIENTATION: ORIENTATION: LEFT

## 2024-02-19 ASSESSMENT — PAIN DESCRIPTION - LOCATION: LOCATION: LEG

## 2024-02-19 ASSESSMENT — PAIN - FUNCTIONAL ASSESSMENT: PAIN_FUNCTIONAL_ASSESSMENT: 0-10

## 2024-02-19 ASSESSMENT — PAIN DESCRIPTION - DESCRIPTORS: DESCRIPTORS: DISCOMFORT

## 2024-02-19 NOTE — ED NOTES
Patient discharged by TERRELL Alcala RN.  Discharge instructions gone over, opportunity to ask questions given.

## 2024-02-19 NOTE — ED PROVIDER NOTES
[facility] EMERGENCY DEPT  EMERGENCY DEPARTMENT ENCOUNTER      Pt Name: Jennie Lala  MRN: [id number]  Birthdate 1970  Date of evaluation: 2/19/2024  Provider: Damari Kraus MD    CHIEF COMPLAINT       Chief Complaint   Patient presents with    Leg Swelling         HISTORY OF PRESENT ILLNESS   (Location/Symptom, Timing/Onset, Context/Setting, Quality, Duration, Modifying Factors, Severity)  Note limiting factors.   The history is provided by the patient.     53-year-old female with a history of anemia, lymphedema, obesity, prediabetes presenting for leg swelling.  Reports bilateral lower extremity swelling for a week.  Reports legs feel more painful and hard.   Patient reports no skin breakdown, no blisters, no weeping.  Denies any infectious symptoms specifically denying fevers chills or vomiting.  Reports no recent falls or trauma.    Triage note reviewed reports that patient had been going to lymphedema clinic but stopped because she was using compression devices at home.    Review of External Medical Records:         Nursing Notes were reviewed.      REVIEW OF SYSTEMS    (2-9 systems for level 4, 10 or more for level 5)     Except as noted above the remainder of the review of systems was reviewed and negative.       PAST MEDICAL HISTORY     Past Medical History:   Diagnosis Date    Anemia     Fibroids     July 2010 - embolization     Herpes genitalia     positive    Ill-defined condition     lympedema    Obesity, Class II, BMI 35-39.9     Prediabetes          SURGICAL HISTORY       Past Surgical History:   Procedure Laterality Date    COLONOSCOPY N/A 5/28/2021    COLONOSCOPY performed by Chaitanya Kraus MD at [facility] ENDOSCOPY    COLPOSCOPY  1990s    WNL per patient    GYN  6/2011    Uterine fibroid embolization.    HERNIA REPAIR  01/04/2021    Robotic-assisted laparoscopic supraumbilical herniorrhaphy with mesh.    MYOMECTOMY      TONSILLECTOMY           CURRENT MEDICATIONS       Discharge Medication

## 2024-02-19 NOTE — ED TRIAGE NOTES
Pt arrives to the ER for complaints of bilateral leg swelling that started about a week ago. Pt states that the left leg feels worse with pain and states that the left leg feels hard.     Pt states she was initially going to a lymphoedema clinic and states that she stopped due to using compression devices at home.     Pt reports that she called family practice and referred her to the ER for possible infection to the leg.     Pt denies any weeping, fevers, nausea, chills or vomiting. Denies any open blisters to legs.

## 2024-02-20 LAB — ECHO BSA: 2.18 M2

## 2024-02-20 PROCEDURE — 93970 EXTREMITY STUDY: CPT

## 2024-02-26 ENCOUNTER — TELEPHONE (OUTPATIENT)
Facility: CLINIC | Age: 54
End: 2024-02-26

## 2024-02-26 SDOH — ECONOMIC STABILITY: FOOD INSECURITY: WITHIN THE PAST 12 MONTHS, THE FOOD YOU BOUGHT JUST DIDN'T LAST AND YOU DIDN'T HAVE MONEY TO GET MORE.: NEVER TRUE

## 2024-02-26 SDOH — ECONOMIC STABILITY: HOUSING INSECURITY
IN THE LAST 12 MONTHS, WAS THERE A TIME WHEN YOU DID NOT HAVE A STEADY PLACE TO SLEEP OR SLEPT IN A SHELTER (INCLUDING NOW)?: NO

## 2024-02-26 SDOH — ECONOMIC STABILITY: FOOD INSECURITY: WITHIN THE PAST 12 MONTHS, YOU WORRIED THAT YOUR FOOD WOULD RUN OUT BEFORE YOU GOT MONEY TO BUY MORE.: NEVER TRUE

## 2024-02-26 SDOH — ECONOMIC STABILITY: INCOME INSECURITY: HOW HARD IS IT FOR YOU TO PAY FOR THE VERY BASICS LIKE FOOD, HOUSING, MEDICAL CARE, AND HEATING?: NOT HARD AT ALL

## 2024-02-26 SDOH — ECONOMIC STABILITY: TRANSPORTATION INSECURITY
IN THE PAST 12 MONTHS, HAS LACK OF TRANSPORTATION KEPT YOU FROM MEETINGS, WORK, OR FROM GETTING THINGS NEEDED FOR DAILY LIVING?: NO

## 2024-02-26 NOTE — TELEPHONE ENCOUNTER
Lexus called from [facility] [employer] to check on status of orders faxed for incontinence supplies for pt.  Unable to locate and forms were faxed and Lexus agreed to fax again.    Pt has appointment tomorrow and forms have been placed in provider's for review. Judy

## 2024-02-27 ENCOUNTER — OFFICE VISIT (OUTPATIENT)
Facility: CLINIC | Age: 54
End: 2024-02-27
Payer: COMMERCIAL

## 2024-02-27 VITALS
SYSTOLIC BLOOD PRESSURE: 129 MMHG | HEIGHT: 63 IN | WEIGHT: 229 LBS | DIASTOLIC BLOOD PRESSURE: 85 MMHG | HEART RATE: 98 BPM | TEMPERATURE: 97.4 F | BODY MASS INDEX: 40.57 KG/M2 | OXYGEN SATURATION: 97 %

## 2024-02-27 DIAGNOSIS — E55.9 VITAMIN D DEFICIENCY, UNSPECIFIED: ICD-10-CM

## 2024-02-27 DIAGNOSIS — E66.01 MORBID OBESITY WITH BMI OF 40.0-44.9, ADULT (HCC): ICD-10-CM

## 2024-02-27 DIAGNOSIS — R26.2 DIFFICULTY IN WALKING, NOT ELSEWHERE CLASSIFIED: ICD-10-CM

## 2024-02-27 DIAGNOSIS — N39.46 MIXED INCONTINENCE: ICD-10-CM

## 2024-02-27 DIAGNOSIS — I89.0 LYMPHEDEMA, NOT ELSEWHERE CLASSIFIED: Primary | ICD-10-CM

## 2024-02-27 DIAGNOSIS — R73.03 PREDIABETES: ICD-10-CM

## 2024-02-27 PROCEDURE — 99214 OFFICE O/P EST MOD 30 MIN: CPT | Performed by: FAMILY MEDICINE

## 2024-02-27 ASSESSMENT — PATIENT HEALTH QUESTIONNAIRE - PHQ9
SUM OF ALL RESPONSES TO PHQ QUESTIONS 1-9: 0
SUM OF ALL RESPONSES TO PHQ9 QUESTIONS 1 & 2: 0
2. FEELING DOWN, DEPRESSED OR HOPELESS: 0

## 2024-02-27 NOTE — PROGRESS NOTES
Family Medicine Follow-Up Progress Note  Patient: Jennie Lala  1970, 53 y.o., female  Encounter Date: 2/27/2024     ASSESSMENT & PLAN    ICD-10-CM    1. Lymphedema, not elsewhere classified  I89.0 External Referral To Vascular Surgery     [facility]     Thyroid [place] Profile      2. Prediabetes  R73.03 Hemoglobin A1C      3. Vitamin D deficiency, unspecified  E55.9 Vitamin D 25 Hydroxy      4. Mixed incontinence  N39.46       5. Difficulty in walking, not elsewhere classified  R26.2       6. Morbid obesity with BMI of 40.0-44.9, adult ([facility])  E66.01     Z68.41           Orders Placed This Encounter   Procedures    Vitamin D 25 Hydroxy     Standing Status:   Future     Standing Expiration Date:   2/27/2025    Thyroid [place] Profile     Standing Status:   Future     Standing Expiration Date:   2/27/2025    Hemoglobin A1C     Standing Status:   Future     Standing Expiration Date:   2/27/2025    External Referral To Vascular Surgery     Referral Priority:   Routine     Referral Type:   Eval and Treat     Referral Reason:   Specialty Services Required     Requested Specialty:   Vascular Surgery     Number of Visits Requested:   1    [facility]     Referral Priority:   Routine     Referral Type:   Eval and Treat     Referral Reason:   Specialty Services Required     Number of Visits Requested:   1       Patient Instructions   Labs and referrals per my orders  Use lasix when needed  Ambulation, elevation and compression are key here    Fu with new PCP also for continuity of care!    ER chart reviewed  Addendum, paperwork received regarding patient for incontinence supplies.  Patient does have ambulatory dysfunction, lymphedema that is not adequately controlled and a history of incontinence, paperwork will be completed and returned on her behalf  CHIEF COMPLAINT  Chief Complaint   Patient presents with    Follow-up    Lymphedema       SUBJECTIVE  Jennie Lala

## 2024-02-27 NOTE — PATIENT INSTRUCTIONS
Labs and referrals per my orders  Use lasix when needed  Ambulation, elevation and compression are key here    Fu with new PCP also for continuity of care!

## 2024-02-27 NOTE — PROGRESS NOTES
Identified pt with two pt identifiers(name and ). Reviewed record in preparation for visit and have obtained necessary documentation. All patient medications has been reviewed.  Chief Complaint   Patient presents with    Follow-up    Lymphedema       Vitals:    24 1010   BP: 129/85   Pulse: 98   Temp: 97.4 °F (36.3 °C)   SpO2: 97%                   Coordination of Care Questionnaire:   1) Have you been to an emergency room, urgent care, or hospitalized since your last visit?   yes    2. Have seen or consulted any other health care provider since your last visit? no

## 2024-03-13 ENCOUNTER — APPOINTMENT (OUTPATIENT)
Facility: HOSPITAL | Age: 54
End: 2024-03-13
Payer: COMMERCIAL

## 2024-03-15 ENCOUNTER — HOSPITAL ENCOUNTER (OUTPATIENT)
Facility: HOSPITAL | Age: 54
Setting detail: RECURRING SERIES
Discharge: HOME | End: 2024-03-18
Payer: COMMERCIAL

## 2024-03-15 DIAGNOSIS — E55.9 VITAMIN D DEFICIENCY, UNSPECIFIED: ICD-10-CM

## 2024-03-15 DIAGNOSIS — R73.03 PREDIABETES: ICD-10-CM

## 2024-03-15 DIAGNOSIS — I89.0 LYMPHEDEMA, NOT ELSEWHERE CLASSIFIED: ICD-10-CM

## 2024-03-15 LAB
25(OH)D3 SERPL-MCNC: 17.8 NG/ML (ref 30–100)
EST. AVERAGE GLUCOSE BLD GHB EST-MCNC: 120 MG/DL
HBA1C MFR BLD: 5.8 % (ref 4–5.6)

## 2024-03-15 PROCEDURE — 97763 ORTHC/PROSTC MGMT SBSQ ENC: CPT

## 2024-03-15 PROCEDURE — 97140 MANUAL THERAPY 1/> REGIONS: CPT

## 2024-03-15 NOTE — PROGRESS NOTES
PHYSICAL THERAPY/OCCUPATIONAL THERAPY - DAILY TREATMENT NOTE (updated 3/23)      Date: 3/15/2024          Patient Name:  Jennie Lala :  1970   Medical   Diagnosis:  Lymphedema, not elsewhere classified [I89.0] Treatment Diagnosis:  Q82.0     Hereditary lymphedema and R60.9     Edema, unspecified    Referral Source:  Yanet Tipton MD Insurance:   Payor: [facility] / Plan: [facility] / Product Type: *No Product type* /                     Patient  verified yes     Visit #   Current  / Total 5 24   Time   In / Out 0945 1040   Total Treatment Time 55   Total Timed Codes 55       SUBJECTIVE    Pain Level (0-10 scale): 0/10  Any medication changes, allergies to medications, adverse drug reactions, diagnosis change, or new procedure performed?: [x] No    [] Yes (see summary sheet for update)  Medications: Verified on Patient Summary List    Subjective functional status/changes:     Patient reports compression stockings have been very difficult to get on and as a result, she has not been wearing as consistently as she should.  She also reports difficulty donning compression pumps so she as not been using every day.  She states that her daughter has recently moved back in and she will have more assistance with donning and doffing.     OBJECTIVE      Therapeutic Procedures:  Tx Min Billable or 1:1 Min (if diff from Tx Min) Procedure, Rationale, Specifics   30 30 10275 Orthotic Management and Training LE (timed): improve positioning of lower extremity during weight bearing and gait, improve pressure distrubution of the plantar aspect of the foot to improve patient's ability to progress to PLOF and address remaining functional goals.    Details if applicable:     Lower Extremity Compression: Measured for the following compression products: Re measured for L LE and new measurements for R LE for day and night time compression.  Foot measurements are a bit elevated today, however

## 2024-03-15 NOTE — THERAPY RECERTIFICATION
reducing risk for infection in 12 visits. ongoing  Patient will demonstrate decrease in self-perceived functional impairment as evidenced by improved score on LLIS outcome measure from 65% impairment to 50% impairment within 12 visits. ongoing  Patient will demonstrate stable limb volumes of left lower extremity with no more than 400 mL increase/decrease over 3 visits to increase patient's ability to safely transition to home maintenance phase of CDT within 12 visits.ongoing       Key Functional Changes/Progress: Patient was recommended to return to clinic for 1-2 more visits before transitioning to home maintenance phase of CDT by physical therapy, however had to cancel visits.  She returns today with increased swelling in BLE due to being unable to maintain consistency with wearing daytime compression and using pump, as patient has difficulty in donning both.  She does feel this will improve in the coming weeks as her daughter has recently moved back home and will be able to help. She also now has increased thigh swelling     Problem List: pain affecting function, decrease ROM, decrease strength, edema affection function, impaired gait/balance, decrease ADL/functional abilities, decrease activity tolerance, decrease flexibility/joint mobility, and decrease transfer abilities    Treatment Plan may include any combination of the followin Therapeutic Exercise, 91366 Manual Therapy, 19456 Therapeutic Activity, 43616 Self Care/Home Management, 39400 Vasopneumatic Device  (Vasopnuematic compression justification:  Per bilateral girth measures taken and listed above the edema is considered significant and having an impact on the patient's strength, balance, gait, transfers, self care, and ADL's), and 23846 Orthotic Management and Training  Patient Goal(s) has been updated and includes:   Short Term Goals: To be accomplished in 6 treatments  Patient will independently identify at least 3 practices of proper skin

## 2024-03-17 LAB — TSH SERPL DL<=0.05 MIU/L-ACNC: 1.83 UIU/ML (ref 0.45–4.5)

## 2024-04-17 NOTE — PROGRESS NOTES
Jennie Lala is a 53 y.o. female presents for a problem visit.    No chief complaint on file.    No LMP recorded. Patient is perimenopausal.      The patient is reporting having: vaginal discharge and irritation since having protected intercourse after 13 years.       Examination chaperoned by Laura Pink MA.

## 2024-04-23 ENCOUNTER — OFFICE VISIT (OUTPATIENT)
Age: 54
End: 2024-04-23
Payer: COMMERCIAL

## 2024-04-23 VITALS — WEIGHT: 219 LBS | SYSTOLIC BLOOD PRESSURE: 164 MMHG | BODY MASS INDEX: 38.79 KG/M2 | DIASTOLIC BLOOD PRESSURE: 96 MMHG

## 2024-04-23 DIAGNOSIS — N89.8 VAGINAL DISCHARGE: Primary | ICD-10-CM

## 2024-04-23 DIAGNOSIS — Z11.3 SCREEN FOR STD (SEXUALLY TRANSMITTED DISEASE): ICD-10-CM

## 2024-04-23 PROCEDURE — 99213 OFFICE O/P EST LOW 20 MIN: CPT | Performed by: OBSTETRICS & GYNECOLOGY

## 2024-04-23 NOTE — PROGRESS NOTES
OB/GYN Problem VIsit    HPI  Jennie Lala is a ,  53 y.o. female who presents for a problem visit.  She had intercourse with a new partner 5 months ago and since then has noticed some vaginal irritation and discharge.  She had not been sexually active in 13 years previous to that.  She would like to be tested for STDs as well.  She does already have a history of HSV.        Past Medical History:   Diagnosis Date    Anemia     Fibroids     2010 - embolization     Herpes genitalia     positive    Ill-defined condition     lympedema    Obesity, Class II, BMI 35-39.9     Prediabetes      Past Surgical History:   Procedure Laterality Date    COLONOSCOPY N/A 2021    COLONOSCOPY performed by Chaitanya Kraus MD at [facility] ENDOSCOPY    COLPOSCOPY  1990s    WNL per patient    GYN  2011    Uterine fibroid embolization.    HERNIA REPAIR  2021    Robotic-assisted laparoscopic supraumbilical herniorrhaphy with mesh.    MYOMECTOMY      TONSILLECTOMY       Social History     Occupational History    Not on file   Tobacco Use    Smoking status: Former     Current packs/day: 0.00     Types: Cigarettes     Quit date: 12/10/2011     Years since quittin.3    Smokeless tobacco: Never   Vaping Use    Vaping Use: Never used   Substance and Sexual Activity    Alcohol use: Yes     Alcohol/week: 2.5 standard drinks of alcohol    Drug use: No    Sexual activity: Yes     Partners: Male     Birth control/protection: Condom     Family History   Problem Relation Age of Onset    Ovarian Cancer Paternal Aunt     Diabetes Other     Cancer Other     Cancer Other         aunt    Cancer Other         endometrial    Kidney Disease Maternal Grandmother     Diabetes Mother     Cancer Maternal Grandmother         ovary        No Known Allergies  Prior to Admission medications    Medication Sig Start Date End Date Taking? Authorizing Provider   vitamin D (ERGOCALCIFEROL) 1.25 MG (62262 UT) CAPS capsule Take 1 capsule by mouth

## 2024-04-24 LAB
HBV SURFACE AG SERPL QL IA: NEGATIVE
HCV IGG SERPL QL IA: NON REACTIVE
HIV 1+2 AB+HIV1 P24 AG SERPL QL IA: NON REACTIVE

## 2024-04-25 LAB — TREPONEMA PALLIDUM IGG+IGM AB [PRESENCE] IN SERUM OR PLASMA BY IMMUNOASSAY: NON REACTIVE

## 2024-04-26 LAB
A VAGINAE DNA VAG QL NAA+PROBE: ABNORMAL SCORE
BVAB2 DNA VAG QL NAA+PROBE: ABNORMAL SCORE
C ALBICANS DNA VAG QL NAA+PROBE: NEGATIVE
C GLABRATA DNA VAG QL NAA+PROBE: NEGATIVE
C TRACH DNA VAG QL NAA+PROBE: NEGATIVE
MEGA1 DNA VAG QL NAA+PROBE: ABNORMAL SCORE
N GONORRHOEA DNA VAG QL NAA+PROBE: NEGATIVE
T VAGINALIS DNA VAG QL NAA+PROBE: NEGATIVE

## 2024-04-29 RX ORDER — METRONIDAZOLE 500 MG/1
500 TABLET ORAL 2 TIMES DAILY
Qty: 14 TABLET | Refills: 0 | Status: SHIPPED | OUTPATIENT
Start: 2024-04-29 | End: 2024-05-06

## 2024-05-28 ENCOUNTER — TRANSCRIBE ORDERS (OUTPATIENT)
Dept: SCHEDULING | Age: 54
End: 2024-05-28

## 2024-05-28 DIAGNOSIS — Z12.31 SCREENING MAMMOGRAM FOR HIGH-RISK PATIENT: Primary | ICD-10-CM

## 2024-08-01 ENCOUNTER — TELEPHONE (OUTPATIENT)
Age: 54
End: 2024-08-01

## 2024-08-01 NOTE — TELEPHONE ENCOUNTER
----- Message from Diego Quinteros sent at 8/1/2024  2:09 PM EDT -----  Regarding: E[facility] Appointment Request  E[facility] Appointment Request    Patient needs appointment for E[facility] Appointment Type: New to Provider.    Patient Requested Dates(s):as soon as possible   Patient Requested Time: morning   Provider Name: Maria C Pro or any doctor with sooner availability     Reason for Appointment Request: New Patient - Requested Provider unavailable  Additional Information : Patient wanted to have an appt. with Estefanía Pro or any other doctor in [facility],  as long as it has sooner available appts. Patient insurance right now is [facility]. Patient current pcp already left [facility].   --------------------------------------------------------------------------------------------------------------------------    Relationship to Patient: Self     Call Back Information: OK to leave message on voicemail  Preferred Call Back Number: Phone [phone] / [phone]

## 2024-08-23 ENCOUNTER — TELEPHONE (OUTPATIENT)
Age: 54
End: 2024-08-23

## 2024-08-23 NOTE — TELEPHONE ENCOUNTER
----- Message from David UNDERWOOD sent at 8/23/2024  2:27 PM EDT -----  Regarding: E[facility] Appointment Request  E[facility] Appointment Request    Patient needs appointment for ECC Appointment Type: New to Provider.    Patient Requested Dates(s): As soon as possible  Patient Requested Time: Anytime  Provider Name: Estefanía Pro MD    Reason for Appointment Request: Established Patient - No appointments available during search. Patient wants to be establish as a new patient with this specific doctor since she's been seeing Yanet Tipton MD, but left the [facility] and wants to get monitored properly since she is pre-diabetic and she cannot waste for more months to get checked by a Doctor who is not available.  --------------------------------------------------------------------------------------------------------------------------    Relationship to Patient: Self     Call Back Information: OK to leave message on voicemail  Preferred Call Back Number: Phone [phone]

## 2024-09-05 ENCOUNTER — TELEPHONE (OUTPATIENT)
Age: 54
End: 2024-09-05

## 2024-09-05 NOTE — TELEPHONE ENCOUNTER
----- Message from Luis CAMPBELL sent at 9/5/2024 11:38 AM EDT -----  Regarding: ECC Appointment Request  ECC Appointment Request    Patient needs appointment for ECC Appointment Type: New to Provider.    The patient is requesting to be establish as a new patient need an appointment for her ED follow up doctor before Yanet Tipton.     Patient Requested Dates(s): as soon as possible  Patient Requested Time: Morning time  Provider Name:Estefanía Pro    Reason for Appointment Request: New Patient - No appointments available during search  --------------------------------------------------------------------------------------------------------------------------    Relationship to Patient: Self     Call Back Information: OK to leave message on [employer]  Preferred Call Back Number: Phone: [phone]

## 2025-03-20 ENCOUNTER — TRANSCRIBE ORDERS (OUTPATIENT)
Facility: HOSPITAL | Age: 55
End: 2025-03-20

## 2025-03-20 DIAGNOSIS — I89.0 LYMPHEDEMA: Primary | ICD-10-CM

## 2025-03-25 ENCOUNTER — TELEPHONE (OUTPATIENT)
Facility: CLINIC | Age: 55
End: 2025-03-25

## 2025-03-25 NOTE — TELEPHONE ENCOUNTER
Pt called regarding incontinence supplies originally ordered by Dr. Tipton last year, states orders have  and wants to know if Dr. Mohr can sign off on it since he saw her several times in office.      Pt scheduled for first available new pt appointment with Dr. Carlin in May and understands message will be sent but signing order is at provider's discretion. Judy

## 2025-03-25 NOTE — TELEPHONE ENCOUNTER
Patient verbalized understanding.     She said DME supplier will be faxing papers to us. She would like for us to fax the form with the information that we have.     If DME needs a F2F encounter from this year she will wait until her new patient appt on 05/14/2025    Javier

## 2025-05-14 ENCOUNTER — TELEPHONE (OUTPATIENT)
Age: 55
End: 2025-05-14

## 2025-05-14 NOTE — TELEPHONE ENCOUNTER
Yoselyn with patient concerning her [facility] [employer] insurance  but it does not become effective until 05/24/2025. Her appointment is not until June so she is going to call back at the end of May to verify that her insurance is running through active. I gave her my contact information.

## 2025-05-27 ENCOUNTER — HOSPITAL ENCOUNTER (OUTPATIENT)
Facility: HOSPITAL | Age: 55
Discharge: HOME | End: 2025-05-30
Attending: FAMILY MEDICINE
Payer: COMMERCIAL

## 2025-05-27 DIAGNOSIS — Z12.31 ENCOUNTER FOR SCREENING MAMMOGRAM FOR HIGH-RISK PATIENT: ICD-10-CM

## 2025-05-27 PROCEDURE — 77063 BREAST TOMOSYNTHESIS BI: CPT

## (undated) DEVICE — CANNULA CUSH AD W/ 14FT TBG

## (undated) DEVICE — SUTURE SZ 0 27IN 5/8 CIR UR-6  TAPER PT VIOLET ABSRB VICRYL J603H

## (undated) DEVICE — INFECTION CONTROL KIT SYS

## (undated) DEVICE — ARM DRAPE

## (undated) DEVICE — CATH IV AUTOGRD BC PNK 20GA 25 -- INSYTE

## (undated) DEVICE — COVER,MAYO STAND,STERILE: Brand: MEDLINE

## (undated) DEVICE — LAPAROSCOPIC TROCAR SLEEVE/SINGLE USE: Brand: KII® OPTICAL ACCESS SYSTEM

## (undated) DEVICE — DEVICE TRNSF SPIK STL 2008S] MICROTEK MEDICAL INC]

## (undated) DEVICE — CONTAINER SPEC 20 ML LID NEUT BUFF FORMALIN 10 % POLYPR STS

## (undated) DEVICE — 3M™ CUROS™ DISINFECTING CAP FOR NEEDLELESS CONNECTORS 270/CARTON 20 CARTONS/CASE CFF1-270: Brand: CUROS™

## (undated) DEVICE — VISUALIZATION SYSTEM: Brand: CLEARIFY

## (undated) DEVICE — 1200 GUARD II KIT W/5MM TUBE W/O VAC TUBE: Brand: GUARDIAN

## (undated) DEVICE — BAG BELONG PT PERS CLEAR HANDL

## (undated) DEVICE — SUTURE ABSRB L30CM 2-0 VLT SPRL PDS + STRATAFIX SXPP1B410

## (undated) DEVICE — SEAL UNIV 5-8MM DISP BX/10 -- DA VINCI XI - SNGL USE

## (undated) DEVICE — CLICKLINE SCISSORS INSERT: Brand: CLICKLINE

## (undated) DEVICE — SOL IRRIGATION INJ NACL 0.9% 500ML BTL

## (undated) DEVICE — Device

## (undated) DEVICE — SUTURE STRATAFIX SPRL PDS + SZ 2-0 L9IN ABSRB VLT CT-1 SXPP1B456

## (undated) DEVICE — FORCEPS BX L240CM JAW DIA2.8MM L CAP W/ NDL MIC MESH TOOTH

## (undated) DEVICE — STERILE POLYISOPRENE POWDER-FREE SURGICAL GLOVES: Brand: PROTEXIS

## (undated) DEVICE — COVER MPLR TIP CRV SCIS ACC DA VINCI

## (undated) DEVICE — (D)SENSOR RMFG 02 PULS OXMTR -- DISC BY MFR USE ITEM 133445

## (undated) DEVICE — BLADELESS OBTURATOR: Brand: WECK VISTA

## (undated) DEVICE — CANISTER, RIGID, 3000CC: Brand: MEDLINE INDUSTRIES, INC.

## (undated) DEVICE — CUFF RMFG BP INF SZ 11 DISP -- LAWSON OEM ITEM 238915

## (undated) DEVICE — SIMPLICITY FLUFF UNDERPAD 23X36, MODERATE: Brand: SIMPLICITY

## (undated) DEVICE — PAD BD MATTRESS 73X32 IN STD CONVOLUTED FOAM LTX FREE

## (undated) DEVICE — COVER LT HNDL PLAS RIG 1 PER PK

## (undated) DEVICE — DRAPE,REIN 53X77,STERILE: Brand: MEDLINE

## (undated) DEVICE — BANDAGE ADH W0.75XL3IN NAT PLAS CURAD

## (undated) DEVICE — SUTURE MCRYL SZ 4-0 L27IN ABSRB UD L19MM PS-2 1/2 CIR PRIM Y426H

## (undated) DEVICE — NDL SPNE LR LCK 18GX6IN PNK --

## (undated) DEVICE — ELECTRODE,RADIOTRANSLUCENT,FOAM,3PK: Brand: MEDLINE

## (undated) DEVICE — PREP SKN CHLRAPRP APL 26ML STR --

## (undated) DEVICE — SUTURE PDS II SZ 0 L27IN ABSRB VLT L36MM CT-1 1/2 CIR Z340H

## (undated) DEVICE — ELECTRO LUBE IS A SINGLE PATIENT USE DEVICE THAT IS INTENDED TO BE USED ON ELECTROSURGICAL ELECTRODES TO REDUCE STICKING.: Brand: KEY SURGICAL ELECTRO LUBE

## (undated) DEVICE — 3M™ IOBAN™ 2 ANTIMICROBIAL INCISE DRAPE 6650EZ: Brand: IOBAN™ 2

## (undated) DEVICE — BAG SPEC BIOHZRD 10 X 10 IN --

## (undated) DEVICE — STRIP,CLOSURE,WOUND,MEDI-STRIP,1/2X4: Brand: MEDLINE

## (undated) DEVICE — TOWEL SURG W17XL27IN STD BLU COT NONFENESTRATED PREWASHED

## (undated) DEVICE — REM POLYHESIVE ADULT PATIENT RETURN ELECTRODE: Brand: VALLEYLAB

## (undated) DEVICE — BITEBLOCK ENDOSCP 60FR MAXI WHT POLYETH STURDY W/ VELC WVN

## (undated) DEVICE — SUTURE 1 STRATAFIX SYMMETRIC PDS + 30CM CT-1 SXPP1A435

## (undated) DEVICE — KIT COLON W/ 1.1OZ LUB AND 2 END

## (undated) DEVICE — BINDER ABD S/M 12X30-45IN --

## (undated) DEVICE — SURGICAL PROCEDURE KIT GEN LAPAROSCOPY LF

## (undated) DEVICE — AIRSEAL BIFURCATED SMOKE EVAC FILTERED TUBE SET: Brand: AIRSEAL

## (undated) DEVICE — SOLIDIFIER MEDC 1200ML -- CONVERT TO 356117

## (undated) DEVICE — STRAP,POSITIONING,KNEE/BODY,FOAM,4X60": Brand: MEDLINE

## (undated) DEVICE — NEEDLE HYPO 22GA L1.5IN BLK S STL HUB POLYPR SHLD REG BVL

## (undated) DEVICE — SET GRAV CK VLV NEEDLESS ST 3 GANGED 4WAY STPCOCK HI FLO 10